# Patient Record
Sex: FEMALE | Race: OTHER | HISPANIC OR LATINO | Employment: OTHER | URBAN - METROPOLITAN AREA
[De-identification: names, ages, dates, MRNs, and addresses within clinical notes are randomized per-mention and may not be internally consistent; named-entity substitution may affect disease eponyms.]

---

## 2017-02-11 ENCOUNTER — APPOINTMENT (OUTPATIENT)
Dept: LAB | Facility: MEDICAL CENTER | Age: 82
End: 2017-02-11
Payer: MEDICARE

## 2017-02-11 ENCOUNTER — TRANSCRIBE ORDERS (OUTPATIENT)
Dept: ADMINISTRATIVE | Facility: HOSPITAL | Age: 82
End: 2017-02-11

## 2017-02-11 DIAGNOSIS — R26.89 SCISSOR GAIT: ICD-10-CM

## 2017-02-11 DIAGNOSIS — N39.0 URINARY TRACT INFECTION, SITE NOT SPECIFIED: ICD-10-CM

## 2017-02-11 DIAGNOSIS — N39.0 URINARY TRACT INFECTION, SITE NOT SPECIFIED: Primary | ICD-10-CM

## 2017-02-11 PROCEDURE — 87086 URINE CULTURE/COLONY COUNT: CPT

## 2017-02-12 LAB — BACTERIA UR CULT: NORMAL

## 2017-02-21 ENCOUNTER — HOSPITAL ENCOUNTER (OUTPATIENT)
Dept: CT IMAGING | Facility: HOSPITAL | Age: 82
Discharge: HOME | End: 2017-02-21
Attending: INTERNAL MEDICINE
Payer: MEDICARE

## 2017-02-21 DIAGNOSIS — R26.89 SCISSOR GAIT: ICD-10-CM

## 2017-02-21 PROCEDURE — 70450 CT HEAD/BRAIN W/O DYE: CPT

## 2017-05-12 ENCOUNTER — TRANSCRIBE ORDERS (OUTPATIENT)
Dept: ADMINISTRATIVE | Facility: HOSPITAL | Age: 82
End: 2017-05-12

## 2017-05-12 ENCOUNTER — LAB (OUTPATIENT)
Dept: LAB | Facility: MEDICAL CENTER | Age: 82
End: 2017-05-12
Payer: MEDICARE

## 2017-05-12 DIAGNOSIS — E22.8 CENTRAL PRECOCIOUS PUBERTY (HCC): ICD-10-CM

## 2017-05-12 DIAGNOSIS — E10.8 TYPE 1 DIABETES MELLITUS WITH COMPLICATION (HCC): ICD-10-CM

## 2017-05-12 DIAGNOSIS — E21.0 PRIMARY HYPERPARATHYROIDISM (HCC): ICD-10-CM

## 2017-05-12 DIAGNOSIS — E22.0 ACROMEGALY AND GIGANTISM (HCC): ICD-10-CM

## 2017-05-12 DIAGNOSIS — E10.8 TYPE 1 DIABETES MELLITUS WITH COMPLICATION (HCC): Primary | ICD-10-CM

## 2017-05-12 LAB
ALBUMIN SERPL BCP-MCNC: 3.6 G/DL (ref 3.5–5)
ALP SERPL-CCNC: 91 U/L (ref 46–116)
ALT SERPL W P-5'-P-CCNC: 39 U/L (ref 12–78)
ANION GAP SERPL CALCULATED.3IONS-SCNC: 10 MMOL/L (ref 4–13)
AST SERPL W P-5'-P-CCNC: 33 U/L (ref 5–45)
BASOPHILS # BLD AUTO: 0.05 THOUSANDS/ΜL (ref 0–0.1)
BASOPHILS NFR BLD AUTO: 1 % (ref 0–1)
BILIRUB SERPL-MCNC: 0.52 MG/DL (ref 0.2–1)
BILIRUB UR QL STRIP: ABNORMAL
BUN SERPL-MCNC: 12 MG/DL (ref 5–25)
CALCIUM ALBUM COR SERPL-MCNC: 11.4 MG/DL (ref 8.3–10.1)
CALCIUM SERPL-MCNC: 11.1 MG/DL (ref 8.3–10.1)
CEA SERPL-MCNC: 2.4 NG/ML (ref 0–3)
CHLORIDE SERPL-SCNC: 107 MMOL/L (ref 100–108)
CLARITY UR: ABNORMAL
CO2 SERPL-SCNC: 24 MMOL/L (ref 21–32)
COLOR UR: ABNORMAL
CORTIS AM PEAK SERPL-MCNC: 14 UG/ML (ref 4.2–22.4)
CREAT SERPL-MCNC: 0.73 MG/DL (ref 0.6–1.3)
EOSINOPHIL # BLD AUTO: 0.3 THOUSAND/ΜL (ref 0–0.61)
EOSINOPHIL NFR BLD AUTO: 4 % (ref 0–6)
ERYTHROCYTE [DISTWIDTH] IN BLOOD BY AUTOMATED COUNT: 14 % (ref 11.6–15.1)
EST. AVERAGE GLUCOSE BLD GHB EST-MCNC: 174 MG/DL
GFR SERPL CREATININE-BSD FRML MDRD: >60 ML/MIN/1.73SQ M
GLUCOSE P FAST SERPL-MCNC: 140 MG/DL (ref 65–99)
GLUCOSE UR STRIP-MCNC: NEGATIVE MG/DL
HBA1C MFR BLD: 7.7 % (ref 4.2–6.3)
HCT VFR BLD AUTO: 41.3 % (ref 34.8–46.1)
HGB BLD-MCNC: 13.6 G/DL (ref 11.5–15.4)
HGB UR QL STRIP.AUTO: NEGATIVE
KETONES UR STRIP-MCNC: NEGATIVE MG/DL
LEUKOCYTE ESTERASE UR QL STRIP: NEGATIVE
LYMPHOCYTES # BLD AUTO: 2.34 THOUSANDS/ΜL (ref 0.6–4.47)
LYMPHOCYTES NFR BLD AUTO: 33 % (ref 14–44)
MAGNESIUM SERPL-MCNC: 2 MG/DL (ref 1.6–2.6)
MCH RBC QN AUTO: 29.4 PG (ref 26.8–34.3)
MCHC RBC AUTO-ENTMCNC: 32.9 G/DL (ref 31.4–37.4)
MCV RBC AUTO: 89 FL (ref 82–98)
MONOCYTES # BLD AUTO: 0.5 THOUSAND/ΜL (ref 0.17–1.22)
MONOCYTES NFR BLD AUTO: 7 % (ref 4–12)
NEUTROPHILS # BLD AUTO: 3.91 THOUSANDS/ΜL (ref 1.85–7.62)
NEUTS SEG NFR BLD AUTO: 55 % (ref 43–75)
NITRITE UR QL STRIP: NEGATIVE
NRBC BLD AUTO-RTO: 0 /100 WBCS
PH UR STRIP.AUTO: 6 [PH] (ref 4.5–8)
PHOSPHATE SERPL-MCNC: 2.8 MG/DL (ref 2.3–4.1)
PLATELET # BLD AUTO: 183 THOUSANDS/UL (ref 149–390)
PMV BLD AUTO: 10.8 FL (ref 8.9–12.7)
POTASSIUM SERPL-SCNC: 4 MMOL/L (ref 3.5–5.3)
PROLACTIN SERPL-MCNC: 1.3 NG/ML
PROT SERPL-MCNC: 7.5 G/DL (ref 6.4–8.2)
PROT UR STRIP-MCNC: NEGATIVE MG/DL
PTH-INTACT SERPL-MCNC: 49.3 PG/ML (ref 14–72)
RBC # BLD AUTO: 4.63 MILLION/UL (ref 3.81–5.12)
SODIUM SERPL-SCNC: 141 MMOL/L (ref 136–145)
SP GR UR STRIP.AUTO: 1.03 (ref 1–1.03)
T4 FREE SERPL-MCNC: 1.07 NG/DL (ref 0.76–1.46)
TSH SERPL DL<=0.05 MIU/L-ACNC: 1.59 UIU/ML (ref 0.36–3.74)
UROBILINOGEN UR QL STRIP.AUTO: 1 E.U./DL
WBC # BLD AUTO: 7.12 THOUSAND/UL (ref 4.31–10.16)

## 2017-05-12 PROCEDURE — 36415 COLL VENOUS BLD VENIPUNCTURE: CPT

## 2017-05-12 PROCEDURE — 82024 ASSAY OF ACTH: CPT

## 2017-05-12 PROCEDURE — 84100 ASSAY OF PHOSPHORUS: CPT

## 2017-05-12 PROCEDURE — 85025 COMPLETE CBC W/AUTO DIFF WBC: CPT

## 2017-05-12 PROCEDURE — 83003 ASSAY GROWTH HORMONE (HGH): CPT

## 2017-05-12 PROCEDURE — 84305 ASSAY OF SOMATOMEDIN: CPT

## 2017-05-12 PROCEDURE — 82378 CARCINOEMBRYONIC ANTIGEN: CPT

## 2017-05-12 PROCEDURE — 80053 COMPREHEN METABOLIC PANEL: CPT

## 2017-05-12 PROCEDURE — 83735 ASSAY OF MAGNESIUM: CPT

## 2017-05-12 PROCEDURE — 84443 ASSAY THYROID STIM HORMONE: CPT

## 2017-05-12 PROCEDURE — 84146 ASSAY OF PROLACTIN: CPT

## 2017-05-12 PROCEDURE — 81003 URINALYSIS AUTO W/O SCOPE: CPT | Performed by: SPECIALIST

## 2017-05-12 PROCEDURE — 83835 ASSAY OF METANEPHRINES: CPT

## 2017-05-12 PROCEDURE — 83036 HEMOGLOBIN GLYCOSYLATED A1C: CPT

## 2017-05-12 PROCEDURE — 83970 ASSAY OF PARATHORMONE: CPT

## 2017-05-12 PROCEDURE — 82533 TOTAL CORTISOL: CPT

## 2017-05-12 PROCEDURE — 82308 ASSAY OF CALCITONIN: CPT

## 2017-05-12 PROCEDURE — 84439 ASSAY OF FREE THYROXINE: CPT

## 2017-05-13 LAB — ACTH PLAS-MCNC: 40.2 PG/ML (ref 7.2–63.3)

## 2017-05-14 LAB
GH SERPL-MCNC: 1.1 NG/ML (ref 0–10)
IGF-I SERPL-MCNC: 230 NG/ML (ref 34–178)

## 2017-05-15 LAB — CALCIT SERPL-MCNC: <2 PG/ML (ref 0–5)

## 2017-05-18 LAB
METANEPH FREE SERPL-MCNC: 23 PG/ML (ref 0–62)
NORMETANEPHRINE SERPL-MCNC: 52 PG/ML (ref 0–145)

## 2017-08-15 ENCOUNTER — ALLSCRIPTS OFFICE VISIT (OUTPATIENT)
Dept: OTHER | Facility: OTHER | Age: 82
End: 2017-08-15

## 2017-09-12 ENCOUNTER — ALLSCRIPTS OFFICE VISIT (OUTPATIENT)
Dept: OTHER | Facility: OTHER | Age: 82
End: 2017-09-12

## 2017-09-26 ENCOUNTER — GENERIC CONVERSION - ENCOUNTER (OUTPATIENT)
Dept: OTHER | Facility: OTHER | Age: 82
End: 2017-09-26

## 2017-11-02 ENCOUNTER — GENERIC CONVERSION - ENCOUNTER (OUTPATIENT)
Dept: OTHER | Facility: OTHER | Age: 82
End: 2017-11-02

## 2017-11-21 ENCOUNTER — ALLSCRIPTS OFFICE VISIT (OUTPATIENT)
Dept: OTHER | Facility: OTHER | Age: 82
End: 2017-11-21

## 2017-11-28 ENCOUNTER — ALLSCRIPTS OFFICE VISIT (OUTPATIENT)
Dept: OTHER | Facility: OTHER | Age: 82
End: 2017-11-28

## 2018-01-11 NOTE — PROGRESS NOTES
Assessment    1  Alzheimers disease (331 0) (G30 9)   2  Hypertension (401 9) (I10)   3  Type 2 diabetes mellitus (250 00) (E11 9)   4  Encounter for preventive health examination (V70 0) (Z00 00)   5  Bilateral impacted cerumen (380 4) (H61 23)   6  [facility]  Bilateral impacted cerumen    · Removal Impacted Cerumen Requiring Instrumentation one or both ears - POC;  Status:Complete;   Done: [id number]    Discussion/Summary    1 ) Dementia - slums evaluated: 10/30  Engage patient in daily social/physical/cognitive exercises  Patient was previously taking namenda/aricept, this has since been dc'd  Discussed with Dr Sudarshan Jerome, will trial patient off ritalin now that she is in structured environment to see how she does - will restart if she begins to have less focus/more tired  2 ) Cerumen impaction - bilat ear canals freed of soft yellow wax with currette  Patient sruthi well  Able to iualize TM's pearly grey bilat at end of procedure  3 ) DM2 - continues on metformin daily without difficulty  HA1c wnl for patient's age group  WIll continue to monitor  4 ) Hypertension - stable with diovan 80mg  Cont daily dosing and monitor for headaches/dizziness  5 ) Health Maintenance - patient currently residing in [address] [place] [address] for assist with ADL/iadl's  She is adjusting well  Will need to follow up on screenings and vaccinations with family as patient is unable to furnish details  Also, will need to discuss advanced directives with family and patient according to her health care goals  No POLST on chart  Chief Complaint  Pt here for Medicare Annual visit      History of Present Illness  Mrs Chris Brunson is an 79yo female who presents for annual wellness visit  Patient is alert and oriented to self, partially to time, place, and situation  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 [employer]  She is able to ambulate with walker  She wears glasses, no hearing aides   She is enjoying her new home at [facility] and has made friends, enjoying social activities, enjoying meals  Mrs Waldemar Hairston has h/o Alzheimer's dementia, hypertension, diabetes 2, and ambulatory dysfunction  She is taking bromocriptine for acromegaly and Ritalin to help her focus and stay up  WIll check SLUMs to evaluate patient's cognitive impairment  Patient answers questions and follows commands  She states she is self care for bathing, grooming, dressing  She does not drive, shop, or cook  She is assist for bill writing and has med management done by facility  Patient denies recent falls  Denies urinary incont  Denies depression  Patient is unsure when last vaccinations were, when last screenings were  Labs were recently done in May 2017  HA1c was 7 7, TSH 1 590, GFR >60, calcium 11 1, and albumin 3 6  Falls Risk: The patient fell 0 times in the past 12 months  The patient currently has no urinary incontinence symptoms  unsure      Review of Systems    Constitutional: negative  Head and Face: negative  ENT: negative  Cardiovascular: negative  Respiratory: negative  Gastrointestinal: negative  Genitourinary: negative  Musculoskeletal: negative  Integumentary and Breasts: negative  Neurological: negative  Psychiatric: negative  Endocrine: negative  Hematologic and Lymphatic: negative  Active Problems    1  Abnormal glucose (790 29) (R73 09)   2  Abnormal weight gain (783 1) (R63 5)   3  Abnormal weight loss (783 21) (R63 4)   4  Alopecia (704 00) (L65 9)   5  Alzheimers disease (331 0) (G30 9)   6  Depression (311) (F32 9)   7  Difficulty in walking (719 7) (R26 2)   8  Fatigue (780 79) (R53 83)   9  Hypercholesterolemia (272 0) (E78 00)   10  Hypertension (401 9) (I10)   11  Osteopenia (733 90) (M85 80)   12  Sleep disorder (780 50) (G47 9)   13  Type 2 diabetes mellitus (250 00) (E11 9)    Past Medical History    1  History of Alzheimers disease (331 0) (G30 9)   2   History of Carpal tunnel syndrome, unspecified laterality (354 0) (G56 00)   3  History of Fracture Of The Ankle (824 8)   4  History of headache (V13 89) (Z87 898)   5  History of Memory Lapses Or Loss (780 93)    The active problems and past medical history were reviewed and updated today  Surgical History    1  History of Rotator Cuff Repair    The surgical history was reviewed and updated today  Family History  Mother    1  Family history of Cardiovascular Disorder (V17 49)   2  Family history of Family Health Status Of Mother -   Father    3  Family history of Brain Tumor   4  Family history of Family Health Status Of Father -     The family history was reviewed and updated today  Social History    · Denied: History of Alcohol Use (History)   · Marital History - Currently    · Never A Smoker   · Never Used Drugs   · Retired From Work   · [employer]  The social history was reviewed and updated today  Current Meds   1  Acetaminophen 500 MG Oral Tablet; take 2 tablet by mouth every 6hours as needed for   pain; Therapy: 21OAE1463 to Recorded   2  Bromocriptine Mesylate 2 5 MG Oral Tablet; take quarter(1/4) of a tablet; Therapy: (Recorded:05Hux3374) to Recorded   3  Citalopram Hydrobromide 10 MG Oral Tablet; one half (1/2) tablet by mouth daily; Therapy: (Recorded:75Gnc5785) to Recorded   4  Diovan 80 MG Oral Tablet; TAKE 1 TABLET DAILY; Therapy: (Recorded:99Ayy2230) to Recorded   5  MetFORMIN HCl - 500 MG Oral Tablet; TAKE 1 TABLET DAILY at bed time; Therapy: (Recorded:58Aoo6927) to Recorded   6  Vitamin D3 2000 UNIT Oral Tablet; Take one tablet daily; Therapy: 16ZBX2094 to (Evaluate:61Cea4685) Recorded    The medication list was reviewed and updated today  Allergies    1   No Known Drug Allergies    Vitals  Signs   Recorded: 2017 09:54AM   Heart Rate: 75, R Radial  Respiration: 18  Systolic: 856, RUE, Sitting  Diastolic: 78, RUE, Sitting  Weight: 154 lb BMI Calculated: 32 19  BSA Calculated: 1 63  O2 Saturation: 96    Future Appointments    Date/Time Provider Specialty Site   09/12/2017 09:30 AM [facility] OFFICE     Signatures   Electronically signed by : JD Green; Aug 15 2017  4:57PM EST                       (Author)    Electronically signed by : Ambrosio Polanco, [facility];  Aug 15 2017  5:00PM EST                       (Author)    Electronically signed by : PREETI Kingsley ; Aug 16 2017  8:51AM EST                       (Co-author)

## 2018-01-11 NOTE — MISCELLANEOUS
Message  Nursing concern: Patient was bathed by nursing who noted scant light blood tinge on wash cloth  After inspection, they noted that there was not hematuria (pt incont urine, yellow urine in pad)  They noted no dark stool or gary blood in stool  They do note skin irritation which has been ongoing and likely source of minor bleeding  Will change from powder to Desitin and follow as needed  Nursing aware        Signatures   Electronically signed by : Foreign Zee, Tiffanie Ayers; Sep 26 2017 10:33AM EST                       (Author)

## 2018-01-13 VITALS
RESPIRATION RATE: 18 BRPM | HEART RATE: 75 BPM | SYSTOLIC BLOOD PRESSURE: 130 MMHG | BODY MASS INDEX: 32.19 KG/M2 | WEIGHT: 154 LBS | DIASTOLIC BLOOD PRESSURE: 78 MMHG | OXYGEN SATURATION: 96 %

## 2018-01-13 NOTE — PROGRESS NOTES
Assessment  Assessed    1  Alzheimers disease (331 0) (G30 9)   2  Abnormal gait (781 2) (R26 9)   3  Hypertension (401 9) (I10)   4  Type 2 diabetes mellitus (250 00) (E11 9)    Discussion/Summary  Discussion Summary:   1 ) Hypertension - bp's checked daily - 109//80  Continue to monitor and continue current regimen  May need to adjust meds if bp trends upward    2 ) DM2 - no regular bs check  Continues w/metformin  Per nursing notes, 9/25 next labs  3 ) Alzheirmer's disease - ritalin restarted  Patient more alert  Patient having some decline in self care, pt now on tolieting program and reminders for meals  Feels good  Contiue with current poc, supportive care for adl's/iadl's  Doing well in current environment  FAST score 6d  4 ) Gait disturbance - fall x 1 off bed  Now in Pt/Ot for strengthening and balance  Fall precautions  Counseling Documentation With Imm: The patient was counseled regarding instructions for management, importance of compliance with treatment  Chief Complaint  Chief Complaint Free Text Note Form: Pt here 2wk f/u- Alz disease, HTN,DM type2      History of Present Illness  HPI: Mrs Marlon Velázquez here for follow up of al, htn, dm    No headaches or dizziness  Does not recall falling from bed as previously reported  Denies weakness  Does a lot of walking  Denies leg pains or back pain  No shortness or breath  Eating well, without gi upset  No gu symptoms  No anxiety/depression  No sleeping problems at night  Unsure how blood sugar is doing  No further hearing issues  Nursing concerns: incont of bowel and bladder - is on q2hr tolieting program  Is aware to remove wet articles but not replace  Will try to leave clean clothes out to prompt patient  Leading patient to dining room, but able to eat by self  Decreased grogginess since ritalin started  Decreased daytime napping  DId have fall, now has bed wedge that was in place from previous residence  Reminders for meals        Review of Systems  Complete-Female:   Constitutional: No fever, no chills, feels well, no tiredness, no recent weight gain or weight loss  Eyes: No complaints of eye pain, no red eyes, no eyesight problems, no discharge, no dry eyes, no itching of eyes  ENT: no complaints of earache, no loss of hearing, no nose bleeds, no nasal discharge, no sore throat, no hoarseness  Cardiovascular: No complaints of slow heart rate, no fast heart rate, no chest pain, no palpitations, no leg claudication, no lower extremity edema  Respiratory: No complaints of shortness of breath, no wheezing, no cough, no SOB on exertion, no orthopnea, no PND  Gastrointestinal: No complaints of abdominal pain, no constipation, no nausea or vomiting, no diarrhea, no bloody stools  Genitourinary: No complaints of dysuria, no incontinence, no pelvic pain, no dysmenorrhea, no vaginal discharge or bleeding  Musculoskeletal: No complaints of arthralgias, no myalgias, no joint swelling or stiffness, no limb pain or swelling  Integumentary: No complaints of skin rash or lesions, no itching, no skin wounds, no breast pain or lump  Neurological: No complaints of headache, no confusion, no convulsions, no numbness, no dizziness or fainting, no tingling, no limb weakness, no difficulty walking  Psychiatric: Not suicidal, no sleep disturbance, no anxiety or depression, no change in personality, no emotional problems  Endocrine: No complaints of proptosis, no hot flashes, no muscle weakness, no deepening of the voice, no feelings of weakness  Hematologic/Lymphatic: No complaints of swollen glands, no swollen glands in the neck, does not bleed easily, does not bruise easily  Active Problems  Problems    1  Abnormal glucose (790 29) (R73 09)   2  Abnormal weight gain (783 1) (R63 5)   3  Abnormal weight loss (783 21) (R63 4)   4  Acromegalia (253 0) (E22 0)   5  Alopecia (704 00) (L65 9)   6  Alzheimers disease (331 0) (G30 9)   7   Arthritis (716 90) (M19 90)   8  Bilateral impacted cerumen (380 4) (H61 23)   9  Depression (311) (F32 9)   10  Difficulty in walking (719 7) (R26 2)   11  Fatigue (780 79) (R53 83)   12  Hypercholesterolemia (272 0) (E78 00)   13  Hypertension (401 9) (I10)   14  Osteopenia (733 90) (M85 80)   15  Sleep disorder (780 50) (G47 9)   16  Type 2 diabetes mellitus (250 00) (E11 9)   17  Vitamin D deficiency (268 9) (E55 9)    Past Medical History  Problems    1  History of Alzheimers disease (331 0) (G30 9)   2  History of Carpal tunnel syndrome, unspecified laterality (354 0) (G56 00)   3  History of Fracture Of The Ankle (824 8)   4  History of headache (V13 89) (Z87 898)   5  History of Memory Lapses Or Loss (780 93)    Surgical History  Problems    1  History of Rotator Cuff Repair    Family History  Mother    1  Family history of Cardiovascular Disorder (V17 49)   2  Family history of Family Health Status Of Mother -   Father    3  Family history of Brain Tumor   4  Family history of Family Health Status Of Father -     Social History  Problems    · Denied: History of Alcohol Use (History)   · Marital History - Currently    · Never A Smoker   · Never Used Drugs   · Retired From Work   · [address]   1  Acetaminophen 500 MG Oral Tablet; take 2 tablet by mouth every 6hours as needed for   pain; Therapy: 36LNR0795 to Recorded   2  Bromocriptine Mesylate 2 5 MG Oral Tablet; take quarter(1/4) of a tablet; Therapy: (Recorded:74Hbg9487) to Recorded   3  Citalopram Hydrobromide 10 MG Oral Tablet; one half (1/2) tablet by mouth daily; Therapy: (Recorded:23Bdw5940) to Recorded   4  Diovan 80 MG Oral Tablet; TAKE 1 TABLET DAILY; Therapy: (Recorded:22Kvp3526) to Recorded   5  MetFORMIN HCl - 500 MG Oral Tablet; TAKE 1 TABLET DAILY at bed time; Therapy: (Recorded:81Gaf4567) to Recorded   6  Methylphenidate HCl - 5 MG Oral Tablet; GIVE 1/2 TABLET DAILY;    Therapy: 75Kqv1728 to Recorded   7  Vitamin D3 2000 UNIT Oral Tablet; Take one tablet daily; Therapy: 48XAW4554 to (Evaluate:65Bkt3215) Recorded  Medication List Reviewed: The medication list was reviewed and updated today  Allergies  Medication    1  No Known Drug Allergies    Vitals  Signs   Recorded: 12Sep2017 09:50AM   Heart Rate: 77, R Radial  Respiration: 18  Systolic: 433, RUE, Sitting  Diastolic: 68, RUE, Sitting  Height: 4 ft 9 5 in  Weight: 156 lb   BMI Calculated: 33 17  BSA Calculated: 1 63  O2 Saturation: 98    Physical Exam  General Multi-System Exam [facility]:   Constitutional   General appearance: No acute distress, well appearing and well nourished  Eyes   EOM: normal    Ears, Nose, Mouth, and Throat   Conversational Hearing: Normal     Pulmonary   Respiratory effort: No increased work of breathing or signs of respiratory distress  Auscultation of lungs: Clear to auscultation  Cardiovascular   Auscultation of heart: Normal rate and rhythm, normal S1 and S2, no murmurs  Examination of extremities for edema and/or varicosities: Normal     Abdomen   Abdomen: Non-tender, no masses  Psychiatric   Orientation to person, place and time: Abnormal   (oriented to person, somewhat to situation, place, time)   Recent and remote memory: Abnormal   Memory: impaired short term memory  Mood and affect: Normal        Results/Data  PHQ-2 Adult Depression Screening 12Sep2017 09:56AM User, [employer]     Test Name Result Flag Reference   PHQ-2 Adult Depression Score 0     Over the last two weeks, how often have you been bothered by any of the following problems?   Little interest or pleasure in doing things: Not at all - 0  Feeling down, depressed, or hopeless: Not at all - 0   PHQ-2 Adult Depression Screening Negative       Falls Risk Assessment (Dx Z13 89 Screen for Neurologic Disorder) 10ZNU7850 09:56AM User, [employer]     Test Name Result Flag Reference   Falls Risk      1 fall without injury in the past year Signatures   Electronically signed by : Kwadwo Polanco; Sep 13 2017  8:18AM EST                       (Author)    Electronically signed by : PREETI Yeager ; Sep 18 2017  8:35AM EST                       (Co-author)

## 2018-01-14 VITALS
HEART RATE: 77 BPM | BODY MASS INDEX: 32.75 KG/M2 | OXYGEN SATURATION: 98 % | HEIGHT: 58 IN | SYSTOLIC BLOOD PRESSURE: 134 MMHG | RESPIRATION RATE: 18 BRPM | WEIGHT: 156 LBS | DIASTOLIC BLOOD PRESSURE: 68 MMHG

## 2018-01-15 NOTE — PROGRESS NOTES
Assessment    1  Type 2 diabetes mellitus (250 00) (E11 9)   2  Diarrhea due to drug (787 91,E980 5) (K52 1)    Discussion/Summary  Discussion Summary:   1 ) Diarrhea- has resolved since metformin dosage is decreased  Continue to monitor if any further stooling  2 ) dm 2- will continue with Dr Rut Rossi recommendations for metformin 1000 mg at HS along with tresiba 10 units subcut at HS  Will have nursing monitor sugar bid  for now- call if blood sugar is less than 100 or greater then 350  Patient has been given sample to receive a by Dr Cesar Epley    Family will bring in glucometer  will order supplies once with glucometer is received  Will have patient return next week to follow up on blood sugars and see if insulin is to be titrated  Counseling Documentation With Imm: The patient, patient's family, patient's caretaker was counseled regarding  Chief Complaint  Chief Complaint Free Text Note Form: PC nurse c/o diarrhea since the increase of metformin      History of Present Illness  HPI: Mrs Sarah Lopez presents for acute visit for diarrhea  Patient standard medication for diabetes is metformin 1000 mg taken at bedtime  She was recently increased to b i d  and once that occurred she started having increasing diarrhea  Blood sugars did respond to that increase went from 200 to140's  Per nurse's notes a m  dose of metformin was Dc'd and she is to receive 10 units tresiba at p m  per Dr Cesar Epley order  Patient is poor historian secondary to dementia: she is unaware that she is diabetic or that she had the diarrhea  Review of Systems  Complete-Female:   Constitutional: No fever, no chills, feels well, no tiredness, no recent weight gain or weight loss  Eyes: No complaints of eye pain, no red eyes, no eyesight problems, no discharge, no dry eyes, no itching of eyes     Cardiovascular: No complaints of slow heart rate, no fast heart rate, no chest pain, no palpitations, no leg claudication, no lower extremity edema  Respiratory: No complaints of shortness of breath, no wheezing, no cough, no SOB on exertion, no orthopnea, no PND  Gastrointestinal: No complaints of abdominal pain, no constipation, no nausea or vomiting, no diarrhea, no bloody stools and as noted in HPI  Musculoskeletal: No complaints of arthralgias, no myalgias, no joint swelling or stiffness, no limb pain or swelling  Active Problems    1  Abnormal gait (781 2) (R26 9)   2  Abnormal glucose (790 29) (R73 09)   3  Abnormal weight gain (783 1) (R63 5)   4  Abnormal weight loss (783 21) (R63 4)   5  Acromegalia (253 0) (E22 0)   6  Alopecia (704 00) (L65 9)   7  Alzheimers disease (331 0) (G30 9)   8  Arthritis (716 90) (M19 90)   9  Bilateral impacted cerumen (380 4) (H61 23)   10  Depression (311) (F32 9)   11  Difficulty in walking (719 7) (R26 2)   12  Fatigue (780 79) (R53 83)   13  Flu vaccine need (V04 81) (Z23)   14  Hypercholesterolemia (272 0) (E78 00)   15  Hypertension (401 9) (I10)   16  Osteopenia (733 90) (M85 80)   17  Sleep disorder (780 50) (G47 9)   18  Vitamin D deficiency (268 9) (E55 9)    Past Medical History    1  History of Alzheimers disease (331 0) (G30 9)   2  History of Carpal tunnel syndrome, unspecified laterality (354 0) (G56 00)   3  History of Fracture Of The Ankle (824 8)   4  History of headache (V13 89) (Z87 898)   5  History of Memory Lapses Or Loss (780 93)    Surgical History    1  History of Rotator Cuff Repair    Family History  Mother    1  Family history of Cardiovascular Disorder (V17 49)   2  Family history of Family Health Status Of Mother -   Father    3  Family history of Brain Tumor   4  Family history of Family Health Status Of Father -     Social History    · Denied: History of Alcohol Use (History)   · Marital History - Currently    · Never A Smoker   · Never Used Drugs   · Retired From Work   · [address]   1   Acetaminophen 500 MG Oral Tablet; take 2 tablet by mouth every 6hours as needed for   pain; Therapy: 49OYZ0744 to Recorded   2  Bromocriptine Mesylate 2 5 MG Oral Tablet; take quarter(1/4) of a tablet; Therapy: (Recorded:35Cuj0159) to Recorded   3  Citalopram Hydrobromide 10 MG Oral Tablet; one half (1/2) tablet by mouth daily; Therapy: (Recorded:49Qeh3789) to Recorded   4  Diovan 80 MG Oral Tablet; TAKE 1 TABLET DAILY; Therapy: (Recorded:20Vkq9989) to Recorded   5  MetFORMIN HCl - 1000 MG Oral Tablet; Take 1 tablet daily; Therapy: (0664 313 06 [id number]) to Recorded   6  Methylphenidate HCl - 5 MG Oral Tablet; TAKE 1 TABLET DAILY; Therapy: 69Rac8090 to Recorded   7  [employer] FlexTouch 200 UNIT/ML Subcutaneous Solution Pen-injector; Admin 10unit at   bedtime; Therapy: 27HBV0113 to Recorded   8  Vitamin D3 2000 UNIT Oral Tablet; Take one tablet daily; Therapy: 79ZCL9785 to (Evaluate:86Irh0529) Recorded  Medication List Reviewed: The medication list was reviewed and updated today  Vitals  Signs    Temperature: 97 8 F  Heart Rate: 76, R Radial  Respiration: 18  Systolic: 560, RUE, Sitting  Diastolic: 70, RUE, Sitting  Weight: 155 lb 0 4 oz  BMI Calculated: 32 97  BSA Calculated: 1 62  O2 Saturation: 96    Physical Exam  General Complete Exam (Brief):   Constitutional   General appearance: No acute distress, well appearing and well nourished  Pulmonary   Respiratory effort: No increased work of breathing or signs of respiratory distress  Auscultation of lungs: Clear to auscultation  Cardiovascular   Auscultation of heart: Normal rate and rhythm, normal S1 and S2, without murmurs  Abdomen   Abdomen: Non-tender, no masses  Musculoskeletal   Gait and station: Abnormal   (steady with walker)   Skin   Skin and subcutaneous tissue: Normal without rashes or lesions  foot exam intact, +soft/sharp sensation     Psychiatric   Orientation to person, place and time: Abnormal   Mental Status: disoriented to place, disoriented to time and inadequate knowledge of current events, but oriented to person     Mood and affect: Normal        Signatures   Electronically signed by : Sixto Bo, [address]; Nov 21 2017 12:53PM EST                       (Author)    Electronically signed by : PREETI Correia ; Nov 29 2017  8:42AM EST                       (Author)    Electronically signed by : PREETI Correia ; Nov 29 2017  8:42AM EST                       (Co-author)

## 2018-01-16 NOTE — PROGRESS NOTES
Assessment  Assessed   1  Type 2 diabetes mellitus (250 00) (E11 9)    Discussion/Summary  Discussion Summary:   1 ) DM2 - Patient tolerating introduction of tresiba w/out any concerns  FBS range from 157- 186  -355  Unclear if these blood sugars are taken after meals  will discuss with nursing to make sure they are not within 2 hours of eating and then consider adding Humalog if needed  Counseling Documentation With Imm: The patient, patient's caretaker was counseled regarding  Patient's Capacity to Self-Care: Patient is unable to Self-Care:      Chief Complaint  Chief Complaint Free Text Note Form: Pt here for 1wk f/u- DM type2      History of Present Illness  HPI: Patient here for follow-up of diabetes  Procedure was started last week and blood sugars are reviewed today  Patient has no concerns she denies any dizziness or nausea  She does not remember getting any shots in the abdomen states that the new method of medications not bother her at all  No nurse available to comment on patient's diarrhea  Patient unable to remember she has had any  Review of Systems  Complete-Female:   Constitutional: No fever, no chills, feels well, no tiredness, no recent weight gain or weight loss  Eyes: No complaints of eye pain, no red eyes, no eyesight problems, no discharge, no dry eyes, no itching of eyes  ENT: no complaints of earache, no loss of hearing, no nose bleeds, no nasal discharge, no sore throat, no hoarseness  Cardiovascular: No complaints of slow heart rate, no fast heart rate, no chest pain, no palpitations, no leg claudication, no lower extremity edema  Respiratory: No complaints of shortness of breath, no wheezing, no cough, no SOB on exertion, no orthopnea, no PND  Gastrointestinal: No complaints of abdominal pain, no constipation, no nausea or vomiting, no diarrhea, no bloody stools     Genitourinary: No complaints of dysuria, no incontinence, no pelvic pain, no dysmenorrhea, no vaginal discharge or bleeding  Musculoskeletal: No complaints of arthralgias, no myalgias, no joint swelling or stiffness, no limb pain or swelling  Integumentary: No complaints of skin rash or lesions, no itching, no skin wounds, no breast pain or lump  Neurological: No complaints of headache, no confusion, no convulsions, no numbness, no dizziness or fainting, no tingling, no limb weakness, no difficulty walking  Psychiatric: Not suicidal, no sleep disturbance, no anxiety or depression, no change in personality, no emotional problems  Active Problems  Problems   1  Abnormal gait (781 2) (R26 9)  2  Abnormal glucose (790 29) (R73 09)  3  Abnormal weight gain (783 1) (R63 5)  4  Abnormal weight loss (783 21) (R63 4)  5  Acromegalia (253 0) (E22 0)  6  Alopecia (704 00) (L65 9)  7  Alzheimers disease (331 0) (G30 9)  8  Arthritis (716 90) (M19 90)  9  Bilateral impacted cerumen (380 4) (H61 23)  10  Depression (311) (F32 9)  11  Diarrhea due to drug (787 91,E980 5) (K52 1)  12  Difficulty in walking (719 7) (R26 2)  13  Fatigue (780 79) (R53 83)  14  Flu vaccine need (V04 81) (Z23)  15  Hypercholesterolemia (272 0) (E78 00)  16  Hypertension (401 9) (I10)  17  Osteopenia (733 90) (M85 80)  18  Sleep disorder (780 50) (G47 9)  19  Type 2 diabetes mellitus (250 00) (E11 9)  20  Vitamin D deficiency (268 9) (E55 9)    Past Medical History  Problems   1  History of Alzheimers disease (331 0) (G30 9)  2  History of Carpal tunnel syndrome, unspecified laterality (354 0) (G56 00)  3  History of Fracture Of The Ankle (824 8)  4  History of headache (V13 89) (Z87 898)  5  History of Memory Lapses Or Loss (780 93)    Surgical History  Problems   1  History of Rotator Cuff Repair    Family History  Mother   1  Family history of Cardiovascular Disorder (V17 49)  2  Family history of Family Health Status Of Mother -   Father   3  Family history of Brain Tumor  4   Family history of Family Health Status Of Father -     Social History  Problems    · Denied: History of Alcohol Use (History)   · Marital History - Currently    · Never A Smoker   · Never Used Drugs   · Retired From Work   · [address]  1  Acetaminophen 500 MG Oral Tablet; take 2 tablet by mouth every 6hours as needed for   pain; Therapy: 77NJP4110 to Recorded  2  Bromocriptine Mesylate 2 5 MG Oral Tablet; take quarter(1/4) of a tablet; Therapy: (Recorded:35Yjp7720) to Recorded  3  Citalopram Hydrobromide 10 MG Oral Tablet; one half (1/2) tablet by mouth daily; Therapy: (Recorded:66Tcn3656) to Recorded  4  Diovan 80 MG Oral Tablet; TAKE 1 TABLET DAILY; Therapy: (Recorded:57Hai8203) to Recorded  5  MetFORMIN HCl - 1000 MG Oral Tablet; Take 1 tablet daily; Therapy: [phone]) to Recorded  6  Methylphenidate HCl - 5 MG Oral Tablet; TAKE 1 TABLET DAILY; Therapy: 67Vdw2153 to (Evaluate:47Rdt8818); Last Rx:2017; Status: ACTIVE -   Retrospective By Protocol Authorization Ordered  7  Leeanne Raul FlexTouch 200 UNIT/ML Subcutaneous Solution Pen-injector; Admin 10unit at   bedtime; Therapy: 64URU4679 to Recorded  8  Vitamin D3 2000 UNIT Oral Tablet; Take one tablet daily; Therapy: 91AQP8939 to (Evaluate:2018) Recorded  Medication List Reviewed: The medication list was reviewed and updated today  Allergies  Medication   1  Amoxicillin CAPS  2  Ibuprofen CAPS    Vitals  Signs   Recorded: 2017 10:39AM   Heart Rate: 78, L Radial  Respiration: 18  Systolic: 271, LUE, Sitting  Diastolic: 60, LUE, Sitting  Weight: 155 lb 0 4 oz  BMI Calculated: 32 97  BSA Calculated: 1 62  O2 Saturation: 95    Physical Exam  General Complete Exam (Brief):   Constitutional   General appearance: No acute distress, well appearing and well nourished      Ears, Nose, Mouth, and Throat   External inspection of ears and nose: Normal     Pulmonary   Respiratory effort: No increased work of breathing or signs of respiratory distress  Auscultation of lungs: Clear to auscultation  Cardiovascular   Auscultation of heart: Normal rate and rhythm, normal S1 and S2, without murmurs  Examination of extremities for edema and/or varicosities: Normal     Abdomen   Abdomen: Non-tender, no masses  Musculoskeletal   Gait and station: Normal     Skin   Skin and subcutaneous tissue: Normal without rashes or lesions  Neurologic   Sensation: No sensory loss  can differentiate sharp and dull on foot exam - no sore areas noted on bilat feet  Psychiatric   Orientation to person, place and time: Abnormal   Mental Status: disoriented to place and disoriented to time, but oriented to person     Mood and affect: Normal        Signatures   Electronically signed by : Army Infante; Nov 28 2017 11:35AM EST                       (Author)    Electronically signed by : PREETI Oakley ; Nov 29 2017  8:52AM EST                       (Co-author)    Electronically signed by : PREETI Oakley ; Nov 29 2017  8:52AM EST                       (Co-author)

## 2018-01-22 VITALS
RESPIRATION RATE: 18 BRPM | HEART RATE: 78 BPM | WEIGHT: 155.03 LBS | DIASTOLIC BLOOD PRESSURE: 60 MMHG | OXYGEN SATURATION: 95 % | BODY MASS INDEX: 32.97 KG/M2 | SYSTOLIC BLOOD PRESSURE: 166 MMHG

## 2018-01-22 VITALS
WEIGHT: 155.03 LBS | RESPIRATION RATE: 18 BRPM | SYSTOLIC BLOOD PRESSURE: 160 MMHG | TEMPERATURE: 97.8 F | HEART RATE: 76 BPM | BODY MASS INDEX: 32.97 KG/M2 | OXYGEN SATURATION: 96 % | DIASTOLIC BLOOD PRESSURE: 70 MMHG

## 2018-03-22 ENCOUNTER — OFFICE VISIT (OUTPATIENT)
Dept: GERIATRICS | Facility: CLINIC | Age: 83
End: 2018-03-22
Payer: MEDICARE

## 2018-03-22 VITALS
WEIGHT: 158.7 LBS | DIASTOLIC BLOOD PRESSURE: 68 MMHG | RESPIRATION RATE: 18 BRPM | BODY MASS INDEX: 33.75 KG/M2 | OXYGEN SATURATION: 93 % | SYSTOLIC BLOOD PRESSURE: 144 MMHG | HEART RATE: 69 BPM

## 2018-03-22 DIAGNOSIS — F02.80 ALZHEIMER'S DEMENTIA WITHOUT BEHAVIORAL DISTURBANCE, UNSPECIFIED TIMING OF DEMENTIA ONSET (HCC): ICD-10-CM

## 2018-03-22 DIAGNOSIS — E11.8 TYPE 2 DIABETES MELLITUS WITH COMPLICATION, WITH LONG-TERM CURRENT USE OF INSULIN (HCC): ICD-10-CM

## 2018-03-22 DIAGNOSIS — Z79.4 TYPE 2 DIABETES MELLITUS WITH COMPLICATION, WITH LONG-TERM CURRENT USE OF INSULIN (HCC): ICD-10-CM

## 2018-03-22 DIAGNOSIS — R26.9 ABNORMAL GAIT: ICD-10-CM

## 2018-03-22 DIAGNOSIS — R53.82 CHRONIC FATIGUE: Primary | ICD-10-CM

## 2018-03-22 DIAGNOSIS — G30.9 ALZHEIMER'S DEMENTIA WITHOUT BEHAVIORAL DISTURBANCE, UNSPECIFIED TIMING OF DEMENTIA ONSET (HCC): ICD-10-CM

## 2018-03-22 DIAGNOSIS — I10 ESSENTIAL HYPERTENSION: ICD-10-CM

## 2018-03-22 PROBLEM — E11.9 TYPE 2 DIABETES MELLITUS (HCC): Status: ACTIVE | Noted: 2017-08-15

## 2018-03-22 PROBLEM — H61.23 BILATERAL IMPACTED CERUMEN: Status: ACTIVE | Noted: 2017-08-15

## 2018-03-22 PROBLEM — E55.9 VITAMIN D DEFICIENCY: Status: ACTIVE | Noted: 2017-08-15

## 2018-03-22 PROBLEM — K52.1 DIARRHEA DUE TO DRUG: Status: ACTIVE | Noted: 2017-11-21

## 2018-03-22 PROBLEM — K52.1 DIARRHEA DUE TO DRUG: Status: RESOLVED | Noted: 2017-11-21 | Resolved: 2018-03-22

## 2018-03-22 PROBLEM — M19.90 ARTHRITIS: Status: ACTIVE | Noted: 2017-08-15

## 2018-03-22 PROBLEM — E22.0: Status: ACTIVE | Noted: 2017-08-15

## 2018-03-22 PROCEDURE — 99214 OFFICE O/P EST MOD 30 MIN: CPT | Performed by: NURSE PRACTITIONER

## 2018-03-22 RX ORDER — LANOLIN ALCOHOL/MO/W.PET/CERES
CREAM (GRAM) TOPICAL DAILY
COMMUNITY
End: 2018-09-11 | Stop reason: SDUPTHER

## 2018-03-22 RX ORDER — VALSARTAN 80 MG/1
TABLET ORAL
COMMUNITY
Start: 2018-02-13 | End: 2018-05-29 | Stop reason: ALTCHOICE

## 2018-03-22 RX ORDER — CITALOPRAM 10 MG/1
TABLET ORAL
COMMUNITY
End: 2018-09-11 | Stop reason: SDUPTHER

## 2018-03-22 RX ORDER — INSULIN LISPRO 100 [IU]/ML
INJECTION, SOLUTION INTRAVENOUS; SUBCUTANEOUS
COMMUNITY
Start: 2018-02-26 | End: 2018-03-22 | Stop reason: ALTCHOICE

## 2018-03-22 RX ORDER — ACETAMINOPHEN 500 MG
TABLET ORAL
Status: ON HOLD | COMMUNITY
Start: 2017-08-15 | End: 2018-07-20 | Stop reason: ALTCHOICE

## 2018-03-22 RX ORDER — MELATONIN 10 MG
TABLET, SUBLINGUAL SUBLINGUAL DAILY
COMMUNITY
End: 2018-07-24 | Stop reason: HOSPADM

## 2018-03-22 RX ORDER — INSULIN GLARGINE 100 [IU]/ML
26 INJECTION, SOLUTION SUBCUTANEOUS 2 TIMES DAILY
COMMUNITY
Start: 2018-03-05 | End: 2018-07-24 | Stop reason: HOSPADM

## 2018-03-22 RX ORDER — BROMOCRIPTINE MESYLATE 2.5 MG/1
TABLET ORAL
COMMUNITY
End: 2018-09-11 | Stop reason: SDUPTHER

## 2018-03-22 RX ORDER — METHYLPHENIDATE HYDROCHLORIDE 10 MG/1
TABLET ORAL
COMMUNITY
Start: 2018-02-15 | End: 2018-08-13 | Stop reason: HOSPADM

## 2018-03-22 NOTE — PATIENT INSTRUCTIONS
Dc'd humalog  Make sure lab work for patient is pending (from endo)  Home Sleep study ordered   Return in 3 months or sooner as needed

## 2018-03-22 NOTE — PROGRESS NOTES
Assessment/Plan:    Fatigue  Continues to have day time sleepiness  ENcourage good sleep hygiene practices - involve in daytime activity  Limit daytime sleeping  Avoid caffeine at night  Avoid hs distractions  WIll order at home sleep study so sleep apnea can be ruled out    Abnormal gait  Stable with walker, no recent falls  Patient cont with pt/ot  Fall precautions  Type 2 diabetes mellitus (HCC)  Stable - -236  Follows with endo  Blood work upcoming  No changes at this time    Hypertension  Stable on valsartan  No changes at this time    Alzheimers disease  Oriented to self, partial to place, time, situation  FAST score 5  Continue with supportive care in [facility] setting  Optimize chronic conditions  Engage in daily social, cognitive, and physical activity  Diagnoses and all orders for this visit:    Chronic fatigue    Abnormal gait    Type 2 diabetes mellitus with complication, with long-term current use of insulin (HCC)    Other orders  -     acetaminophen (TYLENOL) 500 mg tablet; TAKE TWO TABLETS BY MOUTH AT 6:30AM AND 6:30PM   -     bromocriptine (PARLODEL) 2 5 mg tablet; TAKE 1/4 TABLET BY MOUTH AT BEDTIME   -     citalopram (CeleXA) 10 mg tablet; TAKE 1/2 TABLET BY MOUTH DAILY   -     LANTUS SOLOSTAR injection pen 100 units/mL; INJECT 28 UNITS SUBCU   -     HUMALOG KWIKPEN 100 UNIT/ML SOPN; 7 UNIT BEFORE DINNER   -     metFORMIN (GLUCOPHAGE) 1000 MG tablet; Take 1 tablet by mouth daily  -     valsartan (DIOVAN) 80 mg tablet; GIVE ONE TABLET PO DAILY   -     methylphenidate (RITALIN) 10 mg tablet; TAKE 1/2 TAB DAILY IN EARLY AM   -     cyanocobalamin (VITAMIN B-12) 1,000 mcg tablet; Take by mouth daily  -     Cholecalciferol (VITAMIN D3) 92168 units TABS; Take by mouth daily        Subjective:   CC  Pt here for routine visit- Review chronic conditions   Patient ID: Pearl Rodriguez is a 80 y o  female      HPI    Pmh, med list, pb list, allergies    Review of Systems   All other systems reviewed and are negative  Objective:      /68 (BP Location: Right arm, Patient Position: Sitting, Cuff Size: Large)   Pulse 69   Resp 18   Wt 72 kg (158 lb 11 2 oz)   SpO2 93%   BMI 33 75 kg/m²          Physical Exam   Constitutional: She appears well-developed and well-nourished  No distress  HENT:   Head: Normocephalic  Cardiovascular: Normal rate and normal heart sounds  Exam reveals no gallop and no friction rub  No murmur heard  Pulmonary/Chest: Effort normal and breath sounds normal  No respiratory distress  She has no wheezes  She has no rales  Abdominal: Soft  Bowel sounds are normal  She exhibits no distension  There is no tenderness  There is no rebound  Musculoskeletal: Normal range of motion  Neurological: She is alert  Oriented to person, partial to place, time, situation   Skin: Skin is warm and dry  She is not diaphoretic  Psychiatric: She has a normal mood and affect  Her speech is normal  Thought content normal  Cognition and memory are impaired  She exhibits abnormal recent memory  forgetful   Nursing note and vitals reviewed

## 2018-03-22 NOTE — ASSESSMENT & PLAN NOTE
Stable - -236    Follows with endo  Blood work upcoming  [employer] before dinner and monitor

## 2018-03-22 NOTE — ASSESSMENT & PLAN NOTE
Continues to have day time sleepiness  ENcourage good sleep hygiene practices - involve in daytime activity  Limit daytime sleeping  Avoid caffeine at night  Avoid hs distractions    WIll order at home sleep study so sleep apnea can be ruled out

## 2018-03-22 NOTE — ASSESSMENT & PLAN NOTE
Oriented to self, partial to place, time, situation  FAST score 5  Continue with supportive care in [facility] setting  Optimize chronic conditions  Engage in daily social, cognitive, and physical activity

## 2018-04-09 LAB
MICROALBUM.,U,RANDOM (HISTORICAL): 1.4 MG/L
MICROALBUMIN/CREATININE RATIO (HISTORICAL): 16 MG/G CREATININE

## 2018-04-16 ENCOUNTER — HOSPITAL ENCOUNTER (OUTPATIENT)
Dept: RADIOLOGY | Facility: HOSPITAL | Age: 83
Discharge: HOME | End: 2018-04-16
Attending: SPECIALIST
Payer: MEDICARE

## 2018-04-16 DIAGNOSIS — E22.9 PITUITARY HYPERFUNCTION (HCC): ICD-10-CM

## 2018-04-16 DIAGNOSIS — E21.0 PRIMARY HYPERPARATHYROIDISM (HCC): ICD-10-CM

## 2018-04-16 PROCEDURE — A9500 TC99M SESTAMIBI: HCPCS

## 2018-04-16 PROCEDURE — 78071 PARATHYRD PLANAR W/WO SUBTRJ: CPT

## 2018-05-08 ENCOUNTER — OFFICE VISIT (OUTPATIENT)
Dept: GERIATRICS | Facility: CLINIC | Age: 83
End: 2018-05-08
Payer: MEDICARE

## 2018-05-08 VITALS
RESPIRATION RATE: 18 BRPM | WEIGHT: 160.1 LBS | BODY MASS INDEX: 34.05 KG/M2 | SYSTOLIC BLOOD PRESSURE: 164 MMHG | DIASTOLIC BLOOD PRESSURE: 80 MMHG | HEART RATE: 89 BPM

## 2018-05-08 DIAGNOSIS — B37.49 CANDIDAL UTI (URINARY TRACT INFECTION): ICD-10-CM

## 2018-05-08 DIAGNOSIS — Z79.4 TYPE 2 DIABETES MELLITUS WITH COMPLICATION, WITH LONG-TERM CURRENT USE OF INSULIN (HCC): Primary | ICD-10-CM

## 2018-05-08 DIAGNOSIS — E11.8 TYPE 2 DIABETES MELLITUS WITH COMPLICATION, WITH LONG-TERM CURRENT USE OF INSULIN (HCC): Primary | ICD-10-CM

## 2018-05-08 PROCEDURE — 99213 OFFICE O/P EST LOW 20 MIN: CPT | Performed by: NURSE PRACTITIONER

## 2018-05-08 NOTE — ASSESSMENT & PLAN NOTE
· BS elevated to 400's this week  · UA checked this am, results pending  · Recent endo appt - please obtain notes  · Increase Lantus to 32 units subcut @ hs  · Give 3 units novolog sub cut prn:  if BS>350, give with snack/food  Notify provider if BS <80 or >400

## 2018-05-08 NOTE — PROGRESS NOTES
Assessment/Plan:    Type 2 diabetes mellitus (HCC)  · BS elevated to 400's this week  · UA checked this am, results pending  · Recent endo appt - please obtain notes  · Increase Lantus to 32 units subcut @ hs  · Give 3 units novolog sub cut prn:  if BS>350, give with snack/food  Notify provider if BS <80 or >400  Candidal UTI (urinary tract infection)  · +yeast in UA  · Discussed w/dr Dyson Stacks - will give diflucan 150mg x1 today and f/u with cx tomorrow       There are no diagnoses linked to this encounter  Subjective:   CC  Pt here for f/u due elevated BS level at 483 05/07   Patient ID: Lenin Segal is a 80 y o  female  HPI    The following portions of the patient's history were reviewed and updated as appropriate: past family history, past social history and past surgical history  Review of Systems   All other systems reviewed and are negative  Objective:      /80 (BP Location: Left arm, Patient Position: Sitting, Cuff Size: Standard)   Pulse 89   Resp 18   Wt 72 6 kg (160 lb 1 6 oz)   BMI 34 05 kg/m²          Physical Exam   Constitutional: She appears well-developed and well-nourished  No distress  HENT:   Head: Normocephalic  Cardiovascular: Normal rate and normal heart sounds  Exam reveals no gallop and no friction rub  No murmur heard  Pulmonary/Chest: Effort normal and breath sounds normal  No respiratory distress  She has no wheezes  She has no rales  Abdominal: Soft  Bowel sounds are normal  She exhibits no distension  There is no tenderness  There is no rebound  Musculoskeletal: Normal range of motion  Neurological: She is alert  Forgetful  Oriented to person, partial to place, time, situation   Skin: Skin is warm and dry  She is not diaphoretic  Psychiatric: She has a normal mood and affect  Her speech is normal and behavior is normal  Thought content normal  Cognition and memory are impaired  She exhibits abnormal recent memory     Nursing note and vitals reviewed

## 2018-05-08 NOTE — PATIENT INSTRUCTIONS
· UA checked this am, results pending  · Recent endo appt - please obtain notes  · Increase Lantus to 32 units subcut @ hs  · Give 3 units novolog sub cut prn:  if BS>350, give with snack/food  Notify provider if BS <80 or >400

## 2018-05-08 NOTE — ASSESSMENT & PLAN NOTE
· +yeast in UA  · Discussed w/dr Chelly Holley - will give diflucan 150mg x1 today and f/u with cx tomorrow

## 2018-05-16 ENCOUNTER — HOSPITAL ENCOUNTER (OUTPATIENT)
Dept: SLEEP CENTER | Facility: CLINIC | Age: 83
Discharge: HOME | End: 2018-05-16
Payer: MEDICARE

## 2018-05-16 DIAGNOSIS — R53.82 CHRONIC FATIGUE: ICD-10-CM

## 2018-05-16 PROCEDURE — G0399 HOME SLEEP TEST/TYPE 3 PORTA: HCPCS

## 2018-05-23 DIAGNOSIS — G47.33 OSA (OBSTRUCTIVE SLEEP APNEA): Primary | ICD-10-CM

## 2018-05-24 ENCOUNTER — TELEPHONE (OUTPATIENT)
Dept: SLEEP CENTER | Facility: CLINIC | Age: 83
End: 2018-05-24

## 2018-05-24 NOTE — TELEPHONE ENCOUNTER
Spoke with Javier Richards regarding mild obstructive sleep apnea on study and recommendation for CPAP study  She states that mother has difficulty ambulating and is unsure how she will do for study  Offered sleep consult appointment to discuss  She will speak with her brother who is a physician and call back with how they wish to proceed

## 2018-05-27 ENCOUNTER — HOSPITAL ENCOUNTER (OUTPATIENT)
Dept: SLEEP CENTER | Facility: CLINIC | Age: 83
Discharge: HOME | End: 2018-05-27
Payer: MEDICARE

## 2018-05-27 DIAGNOSIS — G47.33 OSA (OBSTRUCTIVE SLEEP APNEA): ICD-10-CM

## 2018-05-27 PROCEDURE — 95811 POLYSOM 6/>YRS CPAP 4/> PARM: CPT

## 2018-05-28 NOTE — PROGRESS NOTES
Sleep Study Documentation    Pre-Sleep Study       Sleep testing procedure explained to patient:YES    Patient napped prior to study:NO    Caffeine:Dayshift worker after 12PM   Caffeine use:YES- chocolate  6 ounces    Alcohol:Dayshift workers after 5PM: Alcohol use:NO    Typical day for patient:YES       Study Documentation    Treatment     Optimal PAP pressure:  +10  Leak:None  Snore:Eliminated at +10  REM Obtained:no     Supplemental O2: no    Minimum SaO2:  94%  Baseline SaO2:  98%  Minimum SaO2 at final PAP pressure:  95%    PAP masks tried:  Benoit, ResMed Mirage FX nasal, ResMed Airfit F20 full-face    PAP mask choice (final):  ResMed Airfit F20 full-face (small)    Pt had many instances of tugging at her mask throughout the study, but she resumed sleep fairly quickly after each episode  ETCO2:  No    Mode of Therapy:CPAP  CPAP changed to BiPAP:No    EKG abnormalities: no     EEG abnormalities: no    Study Terminated:no    Patient classification: retired       Post-Sleep Study    Medication used at bedtime or during sleep study:NO    Patient reports time it took to fall asleep:20 to 30 minutes    Patient reports waking up during study:3 or more times  Patient reports returning to sleep without difficulty  Patient reports sleeping 4 to 6 hours without dreaming  Patient reports sleep during study:typical    Patient rated sleepiness: Not sleepy or tired    PAP treatment:yes: Post PAP treatment patient reports feeling unchanged and would wear PAP mask at home

## 2018-05-29 ENCOUNTER — OFFICE VISIT (OUTPATIENT)
Dept: GERIATRICS | Facility: CLINIC | Age: 83
End: 2018-05-29
Payer: MEDICARE

## 2018-05-29 VITALS
RESPIRATION RATE: 18 BRPM | OXYGEN SATURATION: 97 % | BODY MASS INDEX: 33.54 KG/M2 | WEIGHT: 157.7 LBS | DIASTOLIC BLOOD PRESSURE: 70 MMHG | HEART RATE: 78 BPM | SYSTOLIC BLOOD PRESSURE: 160 MMHG

## 2018-05-29 DIAGNOSIS — E03.9 HYPOTHYROIDISM, UNSPECIFIED TYPE: Primary | ICD-10-CM

## 2018-05-29 DIAGNOSIS — F02.80 ALZHEIMER'S DEMENTIA WITHOUT BEHAVIORAL DISTURBANCE, UNSPECIFIED TIMING OF DEMENTIA ONSET (HCC): ICD-10-CM

## 2018-05-29 DIAGNOSIS — G47.33 OSA (OBSTRUCTIVE SLEEP APNEA): ICD-10-CM

## 2018-05-29 DIAGNOSIS — R26.9 ABNORMAL GAIT: ICD-10-CM

## 2018-05-29 DIAGNOSIS — I10 ESSENTIAL HYPERTENSION: ICD-10-CM

## 2018-05-29 DIAGNOSIS — G30.9 ALZHEIMER'S DEMENTIA WITHOUT BEHAVIORAL DISTURBANCE, UNSPECIFIED TIMING OF DEMENTIA ONSET (HCC): ICD-10-CM

## 2018-05-29 DIAGNOSIS — E11.9 TYPE 2 DIABETES MELLITUS WITHOUT COMPLICATION, WITH LONG-TERM CURRENT USE OF INSULIN (HCC): ICD-10-CM

## 2018-05-29 DIAGNOSIS — Z79.4 TYPE 2 DIABETES MELLITUS WITHOUT COMPLICATION, WITH LONG-TERM CURRENT USE OF INSULIN (HCC): ICD-10-CM

## 2018-05-29 DIAGNOSIS — G47.33 OSA (OBSTRUCTIVE SLEEP APNEA): Primary | ICD-10-CM

## 2018-05-29 PROCEDURE — 99214 OFFICE O/P EST MOD 30 MIN: CPT | Performed by: NURSE PRACTITIONER

## 2018-05-29 RX ORDER — ACETAMINOPHEN 325 MG/1
325 TABLET ORAL
Status: ON HOLD | COMMUNITY
End: 2018-07-20 | Stop reason: ALTCHOICE

## 2018-05-29 RX ORDER — VALSARTAN 160 MG/1
TABLET ORAL
COMMUNITY
Start: 2018-05-10 | End: 2018-07-24 | Stop reason: HOSPADM

## 2018-05-29 RX ORDER — AMLODIPINE BESYLATE 5 MG/1
10 TABLET ORAL DAILY
COMMUNITY
End: 2018-07-24 | Stop reason: HOSPADM

## 2018-05-29 NOTE — ASSESSMENT & PLAN NOTE
· Norvasc recently started  · Blood pressures continue 120-150s/x  · Continue to monitor, check with manual cuff if SBP>160/x

## 2018-05-29 NOTE — PROGRESS NOTES
Assessment/Plan:    Type 2 diabetes mellitus (HCC)  · Follows with Endo  · -190 with Lantus 40 units, metformin 1000 mg daily  · Continue to monitor and follow up with labs    RAZIA (obstructive sleep apnea)  · Home sleep study completed  · Follow-up sleep study for CPAP completed, results pending    Hypertension  · Norvasc recently started  · Blood pressures continue 120-150s/x  · Continue to monitor, check with manual cuff if SBP>160/x    Alzheimers disease  · Stable  · Cont with 24/7 assist for adl/iadls  · Monitor for MS changes and notify    Abnormal gait  · No reported falls, cont with walker  · PT/OT working with pt  Fall precautions       Diagnoses and all orders for this visit:    Hypothyroidism, unspecified type    Type 2 diabetes mellitus without complication, with long-term current use of insulin (HCC)    RAZIA (obstructive sleep apnea)    Essential hypertension    Other orders  -     acetaminophen (TYLENOL) 325 mg tablet; 325 mg Take two tablet by mouth at 6:30am and 6:30pm  -     amLODIPine (NORVASC) 5 mg tablet; Take 5 mg by mouth daily  -     valsartan (DIOVAN) 160 mg tablet; Give one tablet daily   -     NOVOFINE AUTOCOVER 30G X 8 MM MISC;         Subjective:   CC  Pt here for 2 mth routine- Review chronic conditions  Review BP and BS log while at OV     Patient ID: Sadi Barroso is a 80 y o  female  Patient presents for 2 month follow-up of acute chronic conditions including diabetes, hypertension, ambulatory dysfunction, dementia  Patient denies all complaints  Denies pain, CP/SOB/cough  States she is eating and sleeping well  Denies lower extremity edema  Is pleasantly forgetful  Full per nursing notes patient recently had Norvasc added to her medication regimen for blood pressures of 188/79  She has seen endocrinologist for blood sugars which was improving slightly FBS is about 160s to 180s  She continues to see PT/O T  Is ambulating with walker    She has had no falls per notes  Patient did have home sleep test completed which recommended inpatient sleep study  This was done on Sunday night and results are still pending  Med review, allergies, prb list     Review of Systems   All other systems reviewed and are negative  Objective:      /70 (BP Location: Right arm, Patient Position: Sitting, Cuff Size: Standard)   Pulse 78   Resp 18   Wt 71 5 kg (157 lb 11 2 oz)   SpO2 97%   BMI 33 54 kg/m²          Physical Exam   Constitutional: She appears well-developed and well-nourished  No distress  HENT:   Head: Normocephalic  Cardiovascular: Normal rate and normal heart sounds  Exam reveals no gallop and no friction rub  No murmur heard  Pulmonary/Chest: Effort normal and breath sounds normal  No respiratory distress  She has no wheezes  She has no rales  Abdominal: Soft  Bowel sounds are normal  She exhibits no distension  There is no tenderness  There is no rebound  Musculoskeletal: Normal range of motion  Neurological: She is alert  Oriented to person, partial to place, time, situation  Forgetful   Skin: Skin is warm and dry  She is not diaphoretic  Psychiatric: She has a normal mood and affect  Her speech is normal and behavior is normal  Thought content normal  Cognition and memory are impaired  She exhibits abnormal recent memory  Vitals reviewed

## 2018-05-29 NOTE — ASSESSMENT & PLAN NOTE
· Follows with Endo  · -190 with Lantus 40 units, metformin 1000 mg daily  · Continue to monitor and follow up with labs

## 2018-06-04 ENCOUNTER — TELEPHONE (OUTPATIENT)
Dept: SLEEP CENTER | Facility: CLINIC | Age: 83
End: 2018-06-04

## 2018-06-04 NOTE — TELEPHONE ENCOUNTER
Spoke with daughter regarding consult and CPAP set up  It is difficult to transport patient  Informed that CPAP order thru the sleep center does require a face to face appointment with the ordering doctor  Informed that CPAP could be ordered thru her physician as well which would eliminate an appointment at the sleep center  She will have her brother  Dr Robson Burdick call Dr Ashlie Bermeo to discuss

## 2018-06-04 NOTE — TELEPHONE ENCOUNTER
Patient's son,   Joe Agudelo called  Scheduled Consult with Dr Angel Perrin and CPAP set up 6/12    Rx and data to [facility]

## 2018-06-12 ENCOUNTER — OFFICE VISIT (OUTPATIENT)
Dept: SLEEP CENTER | Facility: CLINIC | Age: 83
End: 2018-06-12
Payer: MEDICARE

## 2018-06-12 ENCOUNTER — HOSPITAL ENCOUNTER (OUTPATIENT)
Dept: SLEEP CENTER | Facility: CLINIC | Age: 83
Discharge: HOME | End: 2018-06-12

## 2018-06-12 VITALS
HEART RATE: 88 BPM | BODY MASS INDEX: 31.65 KG/M2 | WEIGHT: 157 LBS | SYSTOLIC BLOOD PRESSURE: 128 MMHG | DIASTOLIC BLOOD PRESSURE: 70 MMHG | HEIGHT: 59 IN

## 2018-06-12 DIAGNOSIS — G47.33 OSA (OBSTRUCTIVE SLEEP APNEA): Primary | ICD-10-CM

## 2018-06-12 PROCEDURE — 99204 OFFICE O/P NEW MOD 45 MIN: CPT | Performed by: INTERNAL MEDICINE

## 2018-06-12 NOTE — PROGRESS NOTES
Consultation - [facility] : 1930  MRN: [id number]      Assessment:  The patient has moderate obstructive sleep apnea with excessive daytime sleepiness  She also has a history of hypertension, both of which could be related to her RAZIA  She seemed to do well on CPAP at 10 cm and will be set up today through [address] [place] [address] St:  Start CPAP 10 cm    Follow up:  Compliance check      History of Present Illness:   80 y  o female with history of snoring and daytime sleepiness  Home sleep testing demonstrated SEBAS = 15 6 and a subsequent CPAP titration showed a best pressure of 10 cm  The patient has both daytime sleepiness and hypertension      Review of Systems:      Genitourinary none   Cardiology none   Gastrointestinal none   Neurology forgetfulness and difficulty with memory   Constitutional none   Integumentary none   Psychiatry none   Musculoskeletal none   Pulmonary none   ENT none   Endocrine none   Hematological none           Historical Information    Past Medical History:  Dementia, hypertension      Family History: non-contributory      Sleep Schedule: unremarkable    Snoring:    Yes      Witnessed Apnea:    Yes    Medications/Allergies:    Current Outpatient Prescriptions:     acetaminophen (TYLENOL) 325 mg tablet, 325 mg Take two tablet by mouth at 6:30am and 6:30pm, Disp: , Rfl:     acetaminophen (TYLENOL) 500 mg tablet, TAKE TWO TABLETS BY MOUTH AT 6:30AM AND 6:30PM , Disp: , Rfl:     amLODIPine (NORVASC) 5 mg tablet, Take 5 mg by mouth daily, Disp: , Rfl:     bromocriptine (PARLODEL) 2 5 mg tablet, TAKE 1/4 TABLET BY MOUTH AT BEDTIME , Disp: , Rfl:     Cholecalciferol (VITAMIN D3) 74662 units TABS, Take by mouth daily, Disp: , Rfl:     citalopram (CeleXA) 10 mg tablet, TAKE 1/2 TABLET BY MOUTH DAILY , Disp: , Rfl:     cyanocobalamin (VITAMIN B-12) 1,000 mcg tablet, Take by mouth daily, Disp: , Rfl:     LANTUS SOLOSTAR injection pen 100 units/mL, INJECT 28 UNITS SUBCU , Disp: , Rfl:     metFORMIN (GLUCOPHAGE) 1000 MG tablet, Take 1 tablet by mouth daily, Disp: , Rfl:     methylphenidate (RITALIN) 10 mg tablet, TAKE 1/2 TAB DAILY IN EARLY AM , Disp: , Rfl:     NOVOFINE AUTOCOVER 30G X 8 MM MISC, , Disp: , Rfl:     valsartan (DIOVAN) 160 mg tablet, Give one tablet daily , Disp: , Rfl:         No notes on file                  Objective:    Vital Signs:   Vitals:    06/12/18 1500   BP: 128/70   BP Location: Right arm   Pulse: 88   Weight: 71 2 kg (157 lb)   Height: 4' 11" (1 499 m)            [place] Sleepiness Scale: Total score: 20    Physical Exam:    General: Alert, appropriate, cooperative, overweight    Head: NC/AT, no retrognathia    Nose: No septal deviation, nares partially obstructed, mucosa normal    Throat: Airway lumen narrowed, tongue base severely thickened, no tonsils visualized    Neck: Normal, no thyromegaly or lymphadenopathy, no JVD    Heart: RR, normal S1 and S2, 2/6 systolic murmur consistent with aortic sclerosis    Chest: Clear bilaterally    Extremity: No clubbing, cyanosis, 2+ edema    Skin: Warm, dry    Neuro: No motor abnormalities, cranial nerves appear intact    Sleep Study Results:   As above  PAP Pressure: CPAP: 10 cm  DME Provider: [employer]    Counseling / Coordination of Care  Total clinic time spent today 25 minutes  Greater than 50% of total time was spent with the patient and / or family counseling and / or coordination of care  A description of the counseling / coordination of care: We discussed the pathophysiology of obstructive sleep apnea as well as the rationale for positive airway pressure therapy  PREETI Acosta    Board Certified Sleep Specialist

## 2018-07-17 ENCOUNTER — APPOINTMENT (EMERGENCY)
Dept: RADIOLOGY | Facility: HOSPITAL | Age: 83
DRG: 481 | End: 2018-07-17
Payer: MEDICARE

## 2018-07-17 ENCOUNTER — HOSPITAL ENCOUNTER (INPATIENT)
Facility: HOSPITAL | Age: 83
LOS: 2 days | DRG: 481 | End: 2018-07-20
Attending: EMERGENCY MEDICINE | Admitting: ORTHOPAEDIC SURGERY
Payer: MEDICARE

## 2018-07-17 ENCOUNTER — APPOINTMENT (OUTPATIENT)
Dept: RADIOLOGY | Facility: HOSPITAL | Age: 83
DRG: 481 | End: 2018-07-17
Payer: MEDICARE

## 2018-07-17 DIAGNOSIS — R73.9 HYPERGLYCEMIA: ICD-10-CM

## 2018-07-17 DIAGNOSIS — Z48.89 AFTERCARE FOLLOWING SURGERY FOR INJURY OR TRAUMA: ICD-10-CM
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
## 2018-07-17 DIAGNOSIS — S72.001A CLOSED FRACTURE OF RIGHT HIP, INITIAL ENCOUNTER (HCC): ICD-10-CM

## 2018-07-17 LAB
ABO GROUP BLD: NORMAL
ALBUMIN SERPL BCP-MCNC: 3.4 G/DL (ref 3.5–5)
ALP SERPL-CCNC: 93 U/L (ref 46–116)
ALT SERPL W P-5'-P-CCNC: 30 U/L (ref 12–78)
ANION GAP SERPL CALCULATED.3IONS-SCNC: 6 MMOL/L (ref 4–13)
APTT PPP: 37 SECONDS (ref 24–36)
AST SERPL W P-5'-P-CCNC: 28 U/L (ref 5–45)
ATRIAL RATE: 75 BPM
BASOPHILS # BLD AUTO: 0.05 THOUSANDS/ΜL (ref 0–0.1)
BASOPHILS NFR BLD AUTO: 1 % (ref 0–1)
BILIRUB SERPL-MCNC: 0.41 MG/DL (ref 0.2–1)
BLD GP AB SCN SERPL QL: NEGATIVE
BUN SERPL-MCNC: 18 MG/DL (ref 5–25)
CALCIUM SERPL-MCNC: 11.3 MG/DL (ref 8.3–10.1)
CHLORIDE SERPL-SCNC: 104 MMOL/L (ref 100–108)
CO2 SERPL-SCNC: 24 MMOL/L (ref 21–32)
CREAT SERPL-MCNC: 0.82 MG/DL (ref 0.6–1.3)
EOSINOPHIL # BLD AUTO: 0.25 THOUSAND/ΜL (ref 0–0.61)
EOSINOPHIL NFR BLD AUTO: 3 % (ref 0–6)
ERYTHROCYTE [DISTWIDTH] IN BLOOD BY AUTOMATED COUNT: 14.3 % (ref 11.6–15.1)
GFR SERPL CREATININE-BSD FRML MDRD: 65 ML/MIN/1.73SQ M
GLUCOSE SERPL-MCNC: 250 MG/DL (ref 65–140)
HCT VFR BLD AUTO: 35.9 % (ref 34.8–46.1)
HGB BLD-MCNC: 11.6 G/DL (ref 11.5–15.4)
IMM GRANULOCYTES # BLD AUTO: 0.03 THOUSAND/UL (ref 0–0.2)
IMM GRANULOCYTES NFR BLD AUTO: 0 % (ref 0–2)
INR PPP: 1.06 (ref 0.86–1.17)
LYMPHOCYTES # BLD AUTO: 1.97 THOUSANDS/ΜL (ref 0.6–4.47)
LYMPHOCYTES NFR BLD AUTO: 25 % (ref 14–44)
MCH RBC QN AUTO: 29 PG (ref 26.8–34.3)
MCHC RBC AUTO-ENTMCNC: 32.3 G/DL (ref 31.4–37.4)
MCV RBC AUTO: 90 FL (ref 82–98)
MONOCYTES # BLD AUTO: 0.47 THOUSAND/ΜL (ref 0.17–1.22)
MONOCYTES NFR BLD AUTO: 6 % (ref 4–12)
NEUTROPHILS # BLD AUTO: 4.98 THOUSANDS/ΜL (ref 1.85–7.62)
NEUTS SEG NFR BLD AUTO: 65 % (ref 43–75)
NRBC BLD AUTO-RTO: 0 /100 WBCS
P AXIS: 71 DEGREES
PLATELET # BLD AUTO: 164 THOUSANDS/UL (ref 149–390)
PMV BLD AUTO: 9.4 FL (ref 8.9–12.7)
POTASSIUM SERPL-SCNC: 4.3 MMOL/L (ref 3.5–5.3)
PR INTERVAL: 146 MS
PROT SERPL-MCNC: 7.4 G/DL (ref 6.4–8.2)
PROTHROMBIN TIME: 13.9 SECONDS (ref 11.8–14.2)
QRS AXIS: 15 DEGREES
QRSD INTERVAL: 74 MS
QT INTERVAL: 364 MS
QTC INTERVAL: 406 MS
RBC # BLD AUTO: 4 MILLION/UL (ref 3.81–5.12)
RH BLD: POSITIVE
SODIUM SERPL-SCNC: 134 MMOL/L (ref 136–145)
SPECIMEN EXPIRATION DATE: NORMAL
T WAVE AXIS: 75 DEGREES
TROPONIN I SERPL-MCNC: <0.02 NG/ML
VENTRICULAR RATE: 75 BPM
WBC # BLD AUTO: 7.75 THOUSAND/UL (ref 4.31–10.16)

## 2018-07-17 PROCEDURE — 86850 RBC ANTIBODY SCREEN: CPT | Performed by: ORTHOPAEDIC SURGERY

## 2018-07-17 PROCEDURE — 85610 PROTHROMBIN TIME: CPT | Performed by: EMERGENCY MEDICINE

## 2018-07-17 PROCEDURE — 87081 CULTURE SCREEN ONLY: CPT | Performed by: ORTHOPAEDIC SURGERY

## 2018-07-17 PROCEDURE — 73502 X-RAY EXAM HIP UNI 2-3 VIEWS: CPT

## 2018-07-17 PROCEDURE — 86900 BLOOD TYPING SEROLOGIC ABO: CPT | Performed by: ORTHOPAEDIC SURGERY

## 2018-07-17 PROCEDURE — 72125 CT NECK SPINE W/O DYE: CPT

## 2018-07-17 PROCEDURE — 93010 ELECTROCARDIOGRAM REPORT: CPT | Performed by: INTERNAL MEDICINE

## 2018-07-17 PROCEDURE — 85025 COMPLETE CBC W/AUTO DIFF WBC: CPT | Performed by: EMERGENCY MEDICINE

## 2018-07-17 PROCEDURE — 85730 THROMBOPLASTIN TIME PARTIAL: CPT | Performed by: EMERGENCY MEDICINE

## 2018-07-17 PROCEDURE — 80053 COMPREHEN METABOLIC PANEL: CPT | Performed by: EMERGENCY MEDICINE

## 2018-07-17 PROCEDURE — 84484 ASSAY OF TROPONIN QUANT: CPT | Performed by: EMERGENCY MEDICINE

## 2018-07-17 PROCEDURE — 86901 BLOOD TYPING SEROLOGIC RH(D): CPT | Performed by: ORTHOPAEDIC SURGERY

## 2018-07-17 PROCEDURE — 71045 X-RAY EXAM CHEST 1 VIEW: CPT

## 2018-07-17 PROCEDURE — 73552 X-RAY EXAM OF FEMUR 2/>: CPT

## 2018-07-17 PROCEDURE — 99285 EMERGENCY DEPT VISIT HI MDM: CPT

## 2018-07-17 PROCEDURE — 70450 CT HEAD/BRAIN W/O DYE: CPT

## 2018-07-17 PROCEDURE — 96374 THER/PROPH/DIAG INJ IV PUSH: CPT

## 2018-07-17 PROCEDURE — 73030 X-RAY EXAM OF SHOULDER: CPT

## 2018-07-17 PROCEDURE — 93005 ELECTROCARDIOGRAM TRACING: CPT

## 2018-07-17 PROCEDURE — 36415 COLL VENOUS BLD VENIPUNCTURE: CPT

## 2018-07-17 RX ORDER — LIDOCAINE 50 MG/G
1 PATCH TOPICAL DAILY
COMMUNITY
End: 2018-08-13 | Stop reason: HOSPADM

## 2018-07-17 RX ORDER — FENTANYL CITRATE 50 UG/ML
25 INJECTION, SOLUTION INTRAMUSCULAR; INTRAVENOUS ONCE
Status: COMPLETED | OUTPATIENT
Start: 2018-07-17 | End: 2018-07-17

## 2018-07-17 RX ORDER — GABAPENTIN 100 MG/1
100 CAPSULE ORAL
Status: DISCONTINUED | OUTPATIENT
Start: 2018-07-17 | End: 2018-07-20 | Stop reason: HOSPADM

## 2018-07-17 RX ORDER — OXYCODONE HYDROCHLORIDE 10 MG/1
10 TABLET ORAL EVERY 4 HOURS PRN
Status: DISCONTINUED | OUTPATIENT
Start: 2018-07-17 | End: 2018-07-17

## 2018-07-17 RX ORDER — GABAPENTIN 100 MG/1
100 CAPSULE ORAL
COMMUNITY
End: 2018-07-24 | Stop reason: HOSPADM

## 2018-07-17 RX ORDER — CITALOPRAM 10 MG/1
10 TABLET ORAL DAILY
Status: DISCONTINUED | OUTPATIENT
Start: 2018-07-18 | End: 2018-07-20 | Stop reason: HOSPADM

## 2018-07-17 RX ORDER — AMLODIPINE BESYLATE 10 MG/1
10 TABLET ORAL DAILY
Status: DISCONTINUED | OUTPATIENT
Start: 2018-07-18 | End: 2018-07-20 | Stop reason: HOSPADM

## 2018-07-17 RX ORDER — OXYCODONE HYDROCHLORIDE 5 MG/1
5 TABLET ORAL EVERY 4 HOURS PRN
Status: DISCONTINUED | OUTPATIENT
Start: 2018-07-17 | End: 2018-07-17

## 2018-07-17 RX ORDER — SODIUM CHLORIDE, SODIUM LACTATE, POTASSIUM CHLORIDE, CALCIUM CHLORIDE 600; 310; 30; 20 MG/100ML; MG/100ML; MG/100ML; MG/100ML
75 INJECTION, SOLUTION INTRAVENOUS CONTINUOUS
Status: DISCONTINUED | OUTPATIENT
Start: 2018-07-18 | End: 2018-07-20 | Stop reason: HOSPADM

## 2018-07-17 RX ORDER — METHYLPHENIDATE HYDROCHLORIDE 10 MG/1
5 TABLET ORAL DAILY
Status: DISCONTINUED | OUTPATIENT
Start: 2018-07-18 | End: 2018-07-20 | Stop reason: HOSPADM

## 2018-07-17 RX ORDER — DOCUSATE SODIUM 100 MG/1
100 CAPSULE, LIQUID FILLED ORAL 2 TIMES DAILY
Status: DISCONTINUED | OUTPATIENT
Start: 2018-07-17 | End: 2018-07-20 | Stop reason: HOSPADM

## 2018-07-17 RX ORDER — VALSARTAN 40 MG/1
80 TABLET ORAL 2 TIMES DAILY
Status: DISCONTINUED | OUTPATIENT
Start: 2018-07-17 | End: 2018-07-20 | Stop reason: HOSPADM

## 2018-07-17 RX ORDER — OXYCODONE HYDROCHLORIDE 5 MG/1
5 TABLET ORAL EVERY 4 HOURS PRN
Status: DISCONTINUED | OUTPATIENT
Start: 2018-07-17 | End: 2018-07-20 | Stop reason: HOSPADM

## 2018-07-17 RX ORDER — OXYCODONE HYDROCHLORIDE 5 MG/1
2.5 TABLET ORAL EVERY 4 HOURS PRN
Status: DISCONTINUED | OUTPATIENT
Start: 2018-07-17 | End: 2018-07-20 | Stop reason: HOSPADM

## 2018-07-17 RX ORDER — LIDOCAINE 50 MG/G
1 PATCH TOPICAL DAILY
Status: DISCONTINUED | OUTPATIENT
Start: 2018-07-18 | End: 2018-07-20 | Stop reason: HOSPADM

## 2018-07-17 RX ORDER — CALCIUM CARBONATE 200(500)MG
1000 TABLET,CHEWABLE ORAL DAILY PRN
Status: DISCONTINUED | OUTPATIENT
Start: 2018-07-17 | End: 2018-07-20 | Stop reason: HOSPADM

## 2018-07-17 RX ORDER — ACETAMINOPHEN 325 MG/1
975 TABLET ORAL EVERY 8 HOURS SCHEDULED
Status: DISCONTINUED | OUTPATIENT
Start: 2018-07-17 | End: 2018-07-20 | Stop reason: HOSPADM

## 2018-07-17 RX ORDER — AMOXICILLIN 250 MG
2 CAPSULE ORAL DAILY
Status: DISCONTINUED | OUTPATIENT
Start: 2018-07-18 | End: 2018-07-20 | Stop reason: HOSPADM

## 2018-07-17 RX ORDER — METHOCARBAMOL 500 MG/1
500 TABLET, FILM COATED ORAL EVERY 6 HOURS PRN
Status: DISCONTINUED | OUTPATIENT
Start: 2018-07-17 | End: 2018-07-20 | Stop reason: HOSPADM

## 2018-07-17 RX ADMIN — GABAPENTIN 100 MG: 100 CAPSULE ORAL at 22:02

## 2018-07-17 RX ADMIN — DOCUSATE SODIUM 100 MG: 100 CAPSULE, LIQUID FILLED ORAL at 22:02

## 2018-07-17 RX ADMIN — SODIUM CHLORIDE, SODIUM LACTATE, POTASSIUM CHLORIDE, AND CALCIUM CHLORIDE 75 ML/HR: .6; .31; .03; .02 INJECTION, SOLUTION INTRAVENOUS at 23:17

## 2018-07-17 RX ADMIN — OXYCODONE HYDROCHLORIDE 10 MG: 10 TABLET ORAL at 20:52

## 2018-07-17 RX ADMIN — ACETAMINOPHEN 975 MG: 325 TABLET ORAL at 22:02

## 2018-07-17 RX ADMIN — VALSARTAN 80 MG: 40 TABLET, FILM COATED ORAL at 23:22

## 2018-07-17 RX ADMIN — HYDROMORPHONE HYDROCHLORIDE 0.2 MG: 1 INJECTION, SOLUTION INTRAMUSCULAR; INTRAVENOUS; SUBCUTANEOUS at 22:02

## 2018-07-17 RX ADMIN — FENTANYL CITRATE 25 MCG: 50 INJECTION, SOLUTION INTRAMUSCULAR; INTRAVENOUS at 15:18

## 2018-07-17 NOTE — ED PROVIDER NOTES
History  Chief Complaint   Patient presents with    Hip Injury     pt resided in dementia unit at [facility], pt stood and lost footing and grabbed tablecloth  pt was a witnessed fall onto R hip  R hip is noted to be internally rotated  pts son stated pt was found face first on floor  per pts son, who is a physician, pt is Maryse Jama  89  HPI     a 80-year-old female from nursing facility presents with right hip pain after a witnessed mechanical fall  She was getting up from lunch and fell as she was reaching for her walker  She did not lose consciousness  She denies any pain other than in her right hip  Prior to Admission Medications   Prescriptions Last Dose Informant Patient Reported? Taking?    Cholecalciferol (VITAMIN D3) 06226 units TABS 2018 at Unknown time  Yes Yes   Sig: Take by mouth daily   LANTUS SOLOSTAR injection pen 100 units/mL 2018 at Unknown time  Yes Yes   Si Units 2 (two) times a day INJECT 28 UNITS SUBCU    NOVOFINE AUTOCOVER 30G X 8 MM MISC   Yes No   acetaminophen (TYLENOL) 325 mg tablet   Yes No   Si mg Take two tablet by mouth at 6:30am and 6:30pm   acetaminophen (TYLENOL) 500 mg tablet   Yes No   Sig: TAKE TWO TABLETS BY MOUTH AT 6:30AM AND 6:30PM    amLODIPine (NORVASC) 5 mg tablet 2018 at Unknown time  Yes Yes   Sig: Take 10 mg by mouth daily     bromocriptine (PARLODEL) 2 5 mg tablet 2018 at Unknown time  Yes Yes   Sig: TAKE 1/4 TABLET BY MOUTH AT BEDTIME    citalopram (CeleXA) 10 mg tablet 2018 at Unknown time  Yes Yes   Sig: TAKE 1/2 TABLET BY MOUTH DAILY    cyanocobalamin (VITAMIN B-12) 1,000 mcg tablet 2018 at Unknown time  Yes Yes   Sig: Take by mouth daily   gabapentin (NEURONTIN) 100 mg capsule 2018 at Unknown time  Yes Yes   Sig: Take 100 mg by mouth daily at bedtime   lidocaine (LIDODERM) 5 % 2018 at Unknown time  Yes Yes   Sig: Place 1 patch on the skin daily Remove & Discard patch within 12 hours or as directed by MD   metFORMIN (GLUCOPHAGE) 1000 MG tablet 7/17/2018 at Unknown time  Yes Yes   Sig: Take 1 tablet by mouth daily   methylphenidate (RITALIN) 10 mg tablet 7/17/2018 at Unknown time  Yes Yes   Sig: TAKE 1/2 TAB DAILY IN EARLY AM    valsartan (DIOVAN) 160 mg tablet 7/17/2018 at Unknown time  Yes Yes   Sig: Give one tablet daily       Facility-Administered Medications: None       Past Medical History:   Diagnosis Date    Ankle fracture     Dementia     [facility] ([facility] )     Hypertension     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[facility] )        Past Surgical History:   Procedure Laterality Date    CARPAL TUNNEL RELEASE         History reviewed  No pertinent family history  I have reviewed and agree with the history as documented  Social History   Substance Use Topics    Smoking status: Never Smoker    Smokeless tobacco: Never Used    Alcohol use No        Review of Systems   Constitutional: Negative  Negative for fever  HENT: Negative  Respiratory: Negative  Cardiovascular: Negative  Gastrointestinal: Negative  Negative for abdominal pain  Genitourinary: Negative  Musculoskeletal: Positive for arthralgias  Skin: Negative  Neurological: Negative  Psychiatric/Behavioral:        Baseline confusion       Physical Exam  Physical Exam   Constitutional: She appears well-developed and well-nourished  HENT:   Head: Normocephalic and atraumatic  Nose: Nose normal    Mouth/Throat: Oropharynx is clear and moist    Eyes: EOM are normal  Pupils are equal, round, and reactive to light  Neck: Normal range of motion  No spinous process tenderness and no muscular tenderness present  Normal range of motion present  Cardiovascular: Normal rate and regular rhythm  Pulmonary/Chest: Effort normal and breath sounds normal    Abdominal: Soft  Bowel sounds are normal  There is no tenderness  Musculoskeletal: She exhibits tenderness          Right hip: She exhibits decreased range of motion, tenderness and bony tenderness  Pedal pulses present  Neurological: She is alert  Skin: Skin is warm and dry  Psychiatric: She has a normal mood and affect  Her behavior is normal    Nursing note and vitals reviewed        Vital Signs  ED Triage Vitals   Temp Pulse Respirations Blood Pressure SpO2   -- 07/17/18 1445 07/17/18 1445 07/17/18 1445 07/17/18 1445    74 15 153/65 93 %      Temp src Heart Rate Source Patient Position - Orthostatic VS BP Location FiO2 (%)   -- 07/17/18 1755 07/17/18 1755 07/17/18 1755 --    Monitor Lying Right arm       Pain Score       --                  Vitals:    07/17/18 1445 07/17/18 1545 07/17/18 1630 07/17/18 1755   BP: 153/65 149/64  147/67   Pulse: 74 72 74 81   Patient Position - Orthostatic VS:    Lying       Visual Acuity  Visual Acuity      Most Recent Value   L Pupil Size (mm)  3   R Pupil Size (mm)  3   L Pupil Shape  Round   R Pupil Shape  Round          ED Medications  Medications    EMS REPLENISHMENT MED ( Does not apply Given to EMS 7/17/18 1422)   fentanyl citrate (PF) 100 MCG/2ML 25 mcg (25 mcg Intravenous Given 7/17/18 1518)       Diagnostic Studies  Results Reviewed     Procedure Component Value Units Date/Time    Troponin I [[id number]]  (Normal) Collected:  07/17/18 1435    Lab Status:  Final result Specimen:  Blood from Arm, Left Updated:  07/17/18 1510     Troponin I <0 02 ng/mL     Protime-INR [[id number]]  (Normal) Collected:  07/17/18 1435    Lab Status:  Final result Specimen:  Blood from Arm, Left Updated:  07/17/18 1509     Protime 13 9 seconds      INR 1 06    APTT [[id number]]  (Abnormal) Collected:  07/17/18 1435    Lab Status:  Final result Specimen:  Blood from Arm, Left Updated:  07/17/18 1509     PTT 37 (H) seconds     Comprehensive metabolic panel [[id number]]  (Abnormal) Collected:  07/17/18 1435    Lab Status:  Final result Specimen:  Blood from Arm, Left Updated:  07/17/18 1508     Sodium 134 (L) mmol/L      Potassium 4 3 mmol/L      Chloride 104 mmol/L CO2 24 mmol/L      Anion Gap 6 mmol/L      BUN 18 mg/dL      Creatinine 0 82 mg/dL      Glucose 250 (H) mg/dL      Calcium 11 3 (H) mg/dL      AST 28 U/L      ALT 30 U/L      Alkaline Phosphatase 93 U/L      Total Protein 7 4 g/dL      Albumin 3 4 (L) g/dL      Total Bilirubin 0 41 mg/dL      eGFR 65 ml/min/1 73sq m     Narrative:         National Kidney Disease Education Program recommendations are as follows:  GFR calculation is accurate only with a steady state creatinine  Chronic Kidney disease less than 60 ml/min/1 73 sq  meters  Kidney failure less than 15 ml/min/1 73 sq  meters  CBC and differential [[id number]] Collected:  07/17/18 1435    Lab Status:  Final result Specimen:  Blood from Arm, Left Updated:  07/17/18 1445     WBC 7 75 Thousand/uL      RBC 4 00 Million/uL      Hemoglobin 11 6 g/dL      Hematocrit 35 9 %      MCV 90 fL      MCH 29 0 pg      MCHC 32 3 g/dL      RDW 14 3 %      MPV 9 4 fL      Platelets 529 Thousands/uL      nRBC 0 /100 WBCs      Neutrophils Relative 65 %      Immat GRANS % 0 %      Lymphocytes Relative 25 %      Monocytes Relative 6 %      Eosinophils Relative 3 %      Basophils Relative 1 %      Neutrophils Absolute 4 98 Thousands/µL      Immature Grans Absolute 0 03 Thousand/uL      Lymphocytes Absolute 1 97 Thousands/µL      Monocytes Absolute 0 47 Thousand/µL      Eosinophils Absolute 0 25 Thousand/µL      Basophils Absolute 0 05 Thousands/µL                  XR chest 1 view portable   Final Result by Cathi Solis MD (07/17 1630)      Cardiomegaly with central fullness of the pulmonary vasculature, may represent chronic congestive changes  No acute findings  Workstation performed: [id number]         XR hip/pelv 2-3 vws right if performed   Final Result by Cathi Solis MD (07/17 1628)      Intertrochanteric fracture of the right hip  The study was marked in [facility] for immediate notification              Workstation performed: [id number]         CT spine cervical without contrast   Final Result by Gina Welch MD (07/17 1627)      1  No cervical spine fracture or traumatic malalignment  2   Mosaic attenuation at the lung apices  This is nonspecific though clinical correlation for pulmonary edema or small airways inflammatory disease recommended  Workstation performed: [id number]         XR shoulder 2+ views RIGHT   Final Result by Trena Denny MD (07/17 1625)      Prior right shoulder replacement  No acute osseous abnormality  Workstation performed: [id number]         CT head without contrast   Final Result by Gina Welch MD (07/17 1619)      No acute intracranial abnormality  Microangiopathic changes  Workstation performed: [id number]                    Procedures  Procedures       Phone Contacts  ED Phone Contact    ED Course           Identification of Seniors at Risk      Most Recent Value   (ISAR) Identification of Seniors at Risk   Before the illness or injury that brought you to the Emergency, did you need someone to help you on a regular basis? 1 Filed at: 07/17/2018 1421   In the last 24 hours, have you needed more help than usual?  1 Filed at: 07/17/2018 1421   Have you been hospitalized for one or more nights during the past 6 months? 1 Filed at: 07/17/2018 1421   In general, do you see well?  0 Filed at: 07/17/2018 1421   In general, do you have serious problems with your memory? 1 Filed at: 07/17/2018 1421   Do you take more than three different medications every day?   1 Filed at: 07/17/2018 1421   ISAR Score  5 Filed at: 07/17/2018 1421                          MDM  Number of Diagnoses or Management Options  Closed fracture of right hip, initial encounter [facility]): new and does not require workup  Fall, initial encounter: new and does not require workup  Hyperglycemia: established and worsening  Diagnosis management comments: D/w ortho resident who agreed with admission under   Venita Lpóez Time    Disposition  Final diagnoses:   Fall, initial encounter   Closed fracture of right hip, initial encounter ([facility] )   Hyperglycemia     Time reflects when diagnosis was documented in both MDM as applicable and the Disposition within this note     Time User Action Codes Description Comment    7/17/2018  5:04 PM Brittni Bermeo Add [O75  Cleariana Paula Fall, initial encounter     7/17/2018  5:04 PM Arely Ontiveros Mass Add [S72 001A] Closed fracture of right hip, initial encounter ([facility] )     7/17/2018  5:53 PM Arely Ontiveros Mass Add [R73 9] Hyperglycemia       ED Disposition     ED Disposition Condition Comment    Admit  Case was discussed with ortho and the patient's admission status was agreed to be Admission Status: observation status to the service of Dr Comer Kayser   Follow-up Information    None         Patient's Medications   Discharge Prescriptions    No medications on file     No discharge procedures on file      ED Provider  Electronically Signed by           Elva Palma DO  07/17/18 8668

## 2018-07-17 NOTE — CONSULTS
Orthopedics   [place] Polmata 80 y o  female MRN: [id number]  Unit/Bed#: CW3 351-01      Chief Complaint:   right hip pain    HPI:   80 y  o female with history of dementia and ambulates with walker status post fall this afternoon complaining of right hip pain and inability to bear weight  She was standing up after eating lunch and reaching for her walker and falling  She has never hurt this hip before, and she also complains of right shoulder pain  She currently resides at [facility]  Review Of Systems:   · Skin: Normal  · Neuro: See HPI  · Musculoskeletal: See HPI  · 14 point review of systems negative except as stated above     Past Medical History:   Past Medical History:   Diagnosis Date    Ankle fracture     Dementia     [facility]     Hypertension     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[facility] )     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[facility] )        Past Surgical History:   Past Surgical History:   Procedure Laterality Date    CARPAL TUNNEL RELEASE         Family History:  Family history reviewed and non-contributory  History reviewed  No pertinent family history  Social History:  Social History     Social History    Marital status:      Spouse name: N/A    Number of children: N/A    Years of education: N/A     Social History Main Topics    Smoking status: Never Smoker    Smokeless tobacco: Never Used    Alcohol use No    Drug use: No    Sexual activity: Not Asked     Other Topics Concern    None     Social History Narrative    None       Allergies:    Allergies   Allergen Reactions    Amoxicillin     Ibuprofen            Labs:    0  Lab Value Date/Time   HCT 35 9 07/17/2018 1435   HCT 41 3 05/12/2017 0924   HCT 41 9 11/14/2016 0825   HCT 43 3 08/17/2015 0931   HCT 42 0 02/27/2015 0931   HCT 40 3 10/16/2014 0954   HGB 11 6 07/17/2018 1435   HGB 13 6 05/12/2017 0924   HGB 13 9 11/14/2016 0825   HGB 14 6 08/17/2015 0931   HGB 13 6 02/27/2015 0931   HGB 12 9 10/16/2014 0954   INR 1 06 07/17/2018 1435   WBC 7 75 07/17/2018 1435   WBC 7 12 05/12/2017 0924   WBC 6 33 11/14/2016 0825   WBC 6 84 08/17/2015 0931   WBC 5 79 02/27/2015 0931   WBC 5 25 10/16/2014 0954       Meds:  No current facility-administered medications for this encounter  Blood Culture:   No results found for: BLOODCX    Wound Culture:   No results found for: WOUNDCULT    Ins and Outs:  No intake/output data recorded  Physical Exam:   /69   Pulse 82   Resp 17   SpO2 96%   Gen: Alert and lying in bed in pain  HEENT: EOMI, eyes clear, moist mucus membranes, hearing intact  Respiratory: Bilateral chest rise  No audible wheezing found  Cardiovascular: No audible murmurs  Abdomen: soft nontender/nondistended  Musculoskeletal: right lower extremity  · Skin intact and RLE held in internal rotation  · Tender to palpation over hip  · Positive log roll  · Sensation intact L2-S1  · Positive ankle dorsi/plantar flexion, EHL/FHL  · 2+ DP pulse    Radiology:   I personally reviewed the films  X-rays right hip shows Intertrochanteric femur fracture    Assessment:  80 y  o female status post fall with rightIntertrochanteric femur fracture    Plan:   · Non weight bearing right lower extremity  · Analgesics for pain  · NPO at midnight  · Gómez Catheter insertion  · Medicine consult for all medical management and preoperative risk stratification  · To OR for IM nail femur fracture of Intertrochanteric femur fracture    Nell Venegas MD

## 2018-07-18 ENCOUNTER — ANESTHESIA (OUTPATIENT)
Dept: PERIOP | Facility: HOSPITAL | Age: 83
DRG: 481 | End: 2018-07-18
Payer: MEDICARE

## 2018-07-18 ENCOUNTER — ANESTHESIA EVENT (OUTPATIENT)
Dept: PERIOP | Facility: HOSPITAL | Age: 83
DRG: 481 | End: 2018-07-18
Payer: MEDICARE

## 2018-07-18 ENCOUNTER — APPOINTMENT (OUTPATIENT)
Dept: RADIOLOGY | Facility: HOSPITAL | Age: 83
DRG: 481 | End: 2018-07-18
Payer: MEDICARE

## 2018-07-18 LAB
ANION GAP SERPL CALCULATED.3IONS-SCNC: 8 MMOL/L (ref 4–13)
APTT PPP: 37 SECONDS (ref 24–36)
BASOPHILS # BLD AUTO: 0.05 THOUSANDS/ΜL (ref 0–0.1)
BASOPHILS NFR BLD AUTO: 1 % (ref 0–1)
BUN SERPL-MCNC: 15 MG/DL (ref 5–25)
CALCIUM SERPL-MCNC: 11 MG/DL (ref 8.3–10.1)
CHLORIDE SERPL-SCNC: 104 MMOL/L (ref 100–108)
CO2 SERPL-SCNC: 24 MMOL/L (ref 21–32)
CREAT SERPL-MCNC: 0.69 MG/DL (ref 0.6–1.3)
EOSINOPHIL # BLD AUTO: 0.14 THOUSAND/ΜL (ref 0–0.61)
EOSINOPHIL NFR BLD AUTO: 2 % (ref 0–6)
ERYTHROCYTE [DISTWIDTH] IN BLOOD BY AUTOMATED COUNT: 14.4 % (ref 11.6–15.1)
EST. AVERAGE GLUCOSE BLD GHB EST-MCNC: 186 MG/DL
GFR SERPL CREATININE-BSD FRML MDRD: 79 ML/MIN/1.73SQ M
GLUCOSE P FAST SERPL-MCNC: 215 MG/DL (ref 65–99)
GLUCOSE SERPL-MCNC: 198 MG/DL (ref 65–140)
GLUCOSE SERPL-MCNC: 215 MG/DL (ref 65–140)
GLUCOSE SERPL-MCNC: 220 MG/DL (ref 65–140)
GLUCOSE SERPL-MCNC: 223 MG/DL (ref 65–140)
GLUCOSE SERPL-MCNC: 228 MG/DL (ref 65–140)
GLUCOSE SERPL-MCNC: 277 MG/DL (ref 65–140)
GLUCOSE SERPL-MCNC: 285 MG/DL (ref 65–140)
HBA1C MFR BLD: 8.1 % (ref 4.2–6.3)
HCT VFR BLD AUTO: 37.4 % (ref 34.8–46.1)
HGB BLD-MCNC: 12.1 G/DL (ref 11.5–15.4)
IMM GRANULOCYTES # BLD AUTO: 0.04 THOUSAND/UL (ref 0–0.2)
IMM GRANULOCYTES NFR BLD AUTO: 0 % (ref 0–2)
INR PPP: 1.11 (ref 0.86–1.17)
LYMPHOCYTES # BLD AUTO: 1.49 THOUSANDS/ΜL (ref 0.6–4.47)
LYMPHOCYTES NFR BLD AUTO: 16 % (ref 14–44)
MCH RBC QN AUTO: 28.8 PG (ref 26.8–34.3)
MCHC RBC AUTO-ENTMCNC: 32.4 G/DL (ref 31.4–37.4)
MCV RBC AUTO: 89 FL (ref 82–98)
MONOCYTES # BLD AUTO: 0.59 THOUSAND/ΜL (ref 0.17–1.22)
MONOCYTES NFR BLD AUTO: 6 % (ref 4–12)
NEUTROPHILS # BLD AUTO: 7.18 THOUSANDS/ΜL (ref 1.85–7.62)
NEUTS SEG NFR BLD AUTO: 75 % (ref 43–75)
NRBC BLD AUTO-RTO: 0 /100 WBCS
PLATELET # BLD AUTO: 178 THOUSANDS/UL (ref 149–390)
PMV BLD AUTO: 9.3 FL (ref 8.9–12.7)
POTASSIUM SERPL-SCNC: 4.3 MMOL/L (ref 3.5–5.3)
PROTHROMBIN TIME: 14.4 SECONDS (ref 11.8–14.2)
RBC # BLD AUTO: 4.2 MILLION/UL (ref 3.81–5.12)
SODIUM SERPL-SCNC: 136 MMOL/L (ref 136–145)
WBC # BLD AUTO: 9.49 THOUSAND/UL (ref 4.31–10.16)

## 2018-07-18 PROCEDURE — C1769 GUIDE WIRE: HCPCS | Performed by: ORTHOPAEDIC SURGERY

## 2018-07-18 PROCEDURE — C1713 ANCHOR/SCREW BN/BN,TIS/BN: HCPCS | Performed by: ORTHOPAEDIC SURGERY

## 2018-07-18 PROCEDURE — 73502 X-RAY EXAM HIP UNI 2-3 VIEWS: CPT

## 2018-07-18 PROCEDURE — 27245 TREAT THIGH FRACTURE: CPT | Performed by: ORTHOPAEDIC SURGERY

## 2018-07-18 PROCEDURE — 0QH606Z INSERTION OF INTRAMEDULLARY INTERNAL FIXATION DEVICE INTO RIGHT UPPER FEMUR, OPEN APPROACH: ICD-10-PCS | Performed by: ORTHOPAEDIC SURGERY

## 2018-07-18 PROCEDURE — 85730 THROMBOPLASTIN TIME PARTIAL: CPT | Performed by: ORTHOPAEDIC SURGERY

## 2018-07-18 PROCEDURE — 83036 HEMOGLOBIN GLYCOSYLATED A1C: CPT | Performed by: INTERNAL MEDICINE

## 2018-07-18 PROCEDURE — 85610 PROTHROMBIN TIME: CPT | Performed by: ORTHOPAEDIC SURGERY

## 2018-07-18 PROCEDURE — 82948 REAGENT STRIP/BLOOD GLUCOSE: CPT

## 2018-07-18 PROCEDURE — 85025 COMPLETE CBC W/AUTO DIFF WBC: CPT | Performed by: ORTHOPAEDIC SURGERY

## 2018-07-18 PROCEDURE — 80048 BASIC METABOLIC PNL TOTAL CA: CPT | Performed by: ORTHOPAEDIC SURGERY

## 2018-07-18 DEVICE — 10MM/130 DEG TI CANN TROCH FIXATION NAIL 170MM-STERILE: Type: IMPLANTABLE DEVICE | Site: FEMUR | Status: FUNCTIONAL

## 2018-07-18 DEVICE — 11.0MM TI TROCH FIXATION NAIL SCREW/90MM - STERILE: Type: IMPLANTABLE DEVICE | Site: FEMUR | Status: FUNCTIONAL

## 2018-07-18 DEVICE — 5.0MM TI LOCKING SCREW W/T25 STARDRIVE 32MM F/IM NAIL-STER: Type: IMPLANTABLE DEVICE | Site: FEMUR | Status: FUNCTIONAL

## 2018-07-18 RX ORDER — INSULIN GLARGINE 100 [IU]/ML
26 INJECTION, SOLUTION SUBCUTANEOUS
Status: DISCONTINUED | OUTPATIENT
Start: 2018-07-19 | End: 2018-07-18

## 2018-07-18 RX ORDER — DOCUSATE SODIUM 100 MG/1
100 CAPSULE, LIQUID FILLED ORAL 2 TIMES DAILY
Qty: 10 CAPSULE | Refills: 0 | Status: SHIPPED | OUTPATIENT
Start: 2018-07-18 | End: 2019-07-02 | Stop reason: SDUPTHER

## 2018-07-18 RX ORDER — LABETALOL HYDROCHLORIDE 5 MG/ML
10 INJECTION, SOLUTION INTRAVENOUS AS NEEDED
Status: DISCONTINUED | OUTPATIENT
Start: 2018-07-18 | End: 2018-07-18

## 2018-07-18 RX ORDER — ONDANSETRON 2 MG/ML
INJECTION INTRAMUSCULAR; INTRAVENOUS AS NEEDED
Status: DISCONTINUED | OUTPATIENT
Start: 2018-07-18 | End: 2018-07-18 | Stop reason: SURG

## 2018-07-18 RX ORDER — INSULIN GLARGINE 100 [IU]/ML
26 INJECTION, SOLUTION SUBCUTANEOUS
Status: DISCONTINUED | OUTPATIENT
Start: 2018-07-18 | End: 2018-07-20 | Stop reason: HOSPADM

## 2018-07-18 RX ORDER — OXYCODONE HYDROCHLORIDE 5 MG/1
TABLET ORAL
Qty: 30 TABLET | Refills: 0 | Status: ON HOLD | OUTPATIENT
Start: 2018-07-18 | End: 2018-07-20 | Stop reason: ALTCHOICE

## 2018-07-18 RX ORDER — FENTANYL CITRATE 50 UG/ML
INJECTION, SOLUTION INTRAMUSCULAR; INTRAVENOUS AS NEEDED
Status: DISCONTINUED | OUTPATIENT
Start: 2018-07-18 | End: 2018-07-18 | Stop reason: SURG

## 2018-07-18 RX ORDER — MEPERIDINE HYDROCHLORIDE 25 MG/ML
25 INJECTION INTRAMUSCULAR; INTRAVENOUS; SUBCUTANEOUS AS NEEDED
Status: DISCONTINUED | OUTPATIENT
Start: 2018-07-18 | End: 2018-07-18 | Stop reason: HOSPADM

## 2018-07-18 RX ORDER — LABETALOL HYDROCHLORIDE 5 MG/ML
10 INJECTION, SOLUTION INTRAVENOUS
Status: DISCONTINUED | OUTPATIENT
Start: 2018-07-18 | End: 2018-07-18 | Stop reason: HOSPADM

## 2018-07-18 RX ORDER — LIDOCAINE HYDROCHLORIDE 10 MG/ML
INJECTION, SOLUTION INFILTRATION; PERINEURAL AS NEEDED
Status: DISCONTINUED | OUTPATIENT
Start: 2018-07-18 | End: 2018-07-18 | Stop reason: SURG

## 2018-07-18 RX ORDER — PROPOFOL 10 MG/ML
INJECTION, EMULSION INTRAVENOUS AS NEEDED
Status: DISCONTINUED | OUTPATIENT
Start: 2018-07-18 | End: 2018-07-18 | Stop reason: SURG

## 2018-07-18 RX ORDER — MAGNESIUM HYDROXIDE 1200 MG/15ML
LIQUID ORAL AS NEEDED
Status: DISCONTINUED | OUTPATIENT
Start: 2018-07-18 | End: 2018-07-18 | Stop reason: HOSPADM

## 2018-07-18 RX ADMIN — LIDOCAINE 1 PATCH: 50 PATCH TOPICAL at 18:51

## 2018-07-18 RX ADMIN — METHYLPHENIDATE HYDROCHLORIDE 5 MG: 10 TABLET ORAL at 08:48

## 2018-07-18 RX ADMIN — ACETAMINOPHEN 975 MG: 325 TABLET ORAL at 21:27

## 2018-07-18 RX ADMIN — OXYCODONE HYDROCHLORIDE 5 MG: 5 TABLET ORAL at 06:29

## 2018-07-18 RX ADMIN — FENTANYL CITRATE 25 MCG: 50 INJECTION, SOLUTION INTRAMUSCULAR; INTRAVENOUS at 14:25

## 2018-07-18 RX ADMIN — INSULIN HUMAN 5 UNITS: 100 INJECTION, SOLUTION PARENTERAL at 15:39

## 2018-07-18 RX ADMIN — CEFAZOLIN SODIUM 2000 MG: 2 SOLUTION INTRAVENOUS at 13:43

## 2018-07-18 RX ADMIN — OXYCODONE HYDROCHLORIDE 5 MG: 5 TABLET ORAL at 11:22

## 2018-07-18 RX ADMIN — INSULIN LISPRO 2 UNITS: 100 INJECTION, SOLUTION INTRAVENOUS; SUBCUTANEOUS at 18:52

## 2018-07-18 RX ADMIN — OXYCODONE HYDROCHLORIDE 5 MG: 5 TABLET ORAL at 20:38

## 2018-07-18 RX ADMIN — CITALOPRAM HYDROBROMIDE 10 MG: 10 TABLET ORAL at 08:48

## 2018-07-18 RX ADMIN — VALSARTAN 80 MG: 40 TABLET, FILM COATED ORAL at 18:51

## 2018-07-18 RX ADMIN — PROPOFOL 90 MG: 10 INJECTION, EMULSION INTRAVENOUS at 13:42

## 2018-07-18 RX ADMIN — HYDROMORPHONE HYDROCHLORIDE 0.2 MG: 1 INJECTION, SOLUTION INTRAMUSCULAR; INTRAVENOUS; SUBCUTANEOUS at 16:06

## 2018-07-18 RX ADMIN — INSULIN GLARGINE 26 UNITS: 100 INJECTION, SOLUTION SUBCUTANEOUS at 22:46

## 2018-07-18 RX ADMIN — INSULIN LISPRO 1 UNITS: 100 INJECTION, SOLUTION INTRAVENOUS; SUBCUTANEOUS at 11:28

## 2018-07-18 RX ADMIN — METHOCARBAMOL 500 MG: 500 TABLET ORAL at 08:58

## 2018-07-18 RX ADMIN — GABAPENTIN 100 MG: 100 CAPSULE ORAL at 21:27

## 2018-07-18 RX ADMIN — HYDROMORPHONE HYDROCHLORIDE 0.2 MG: 1 INJECTION, SOLUTION INTRAMUSCULAR; INTRAVENOUS; SUBCUTANEOUS at 15:59

## 2018-07-18 RX ADMIN — LABETALOL 20 MG/4 ML (5 MG/ML) INTRAVENOUS SYRINGE 10 MG: at 15:36

## 2018-07-18 RX ADMIN — DOCUSATE SODIUM 100 MG: 100 CAPSULE, LIQUID FILLED ORAL at 18:51

## 2018-07-18 RX ADMIN — INSULIN LISPRO 2 UNITS: 100 INJECTION, SOLUTION INTRAVENOUS; SUBCUTANEOUS at 09:06

## 2018-07-18 RX ADMIN — SENNOSIDES AND DOCUSATE SODIUM 2 TABLET: 8.6; 5 TABLET ORAL at 18:51

## 2018-07-18 RX ADMIN — AMLODIPINE BESYLATE 10 MG: 10 TABLET ORAL at 08:48

## 2018-07-18 RX ADMIN — INSULIN LISPRO 10 UNITS: 100 INJECTION, SOLUTION INTRAVENOUS; SUBCUTANEOUS at 18:52

## 2018-07-18 RX ADMIN — DEXAMETHASONE SODIUM PHOSPHATE 5 MG: 10 INJECTION INTRAMUSCULAR; INTRAVENOUS at 13:47

## 2018-07-18 RX ADMIN — SODIUM CHLORIDE, SODIUM LACTATE, POTASSIUM CHLORIDE, AND CALCIUM CHLORIDE 75 ML/HR: .6; .31; .03; .02 INJECTION, SOLUTION INTRAVENOUS at 16:24

## 2018-07-18 RX ADMIN — LIDOCAINE HYDROCHLORIDE 50 MG: 10 INJECTION, SOLUTION INFILTRATION; PERINEURAL at 13:42

## 2018-07-18 RX ADMIN — SODIUM CHLORIDE, SODIUM LACTATE, POTASSIUM CHLORIDE, AND CALCIUM CHLORIDE 75 ML/HR: .6; .31; .03; .02 INJECTION, SOLUTION INTRAVENOUS at 11:24

## 2018-07-18 RX ADMIN — ONDANSETRON 4 MG: 2 INJECTION INTRAMUSCULAR; INTRAVENOUS at 13:47

## 2018-07-18 NOTE — PROGRESS NOTES
Patient was scheduled for Xray  Family requested pain medication prior to transport, there are not medication orders at this time  Unable to get in contact with ortho resident or PA  Family refusing Xray until patient can receive pain medications

## 2018-07-18 NOTE — CONSULTS
[address] Internal Medicine-SOD TEAM Milana Crowe 80 y o  female MRN: [id number]  Unit/Bed#: CW3 351-01 Encounter: [id number]      Reason for Admission / Principal Problem: Right femur fracture  Reason for Consult: Pre-op clearance, medical management    Impression:  Patient Active Problem List   Diagnosis    Abnormal gait    Abnormal glucose    Abnormal weight gain    Abnormal weight loss    Acromegalia [facility]    Alopecia    Alzheimers disease    Arthritis    Bilateral impacted cerumen    Depression    Fatigue    Hypercholesterolemia    Hypertension    Osteopenia    Sleep disorder    Type 2 diabetes mellitus ([facility] )    Vitamin D deficiency    Candidal UTI (urinary tract infection)    RAZIA (obstructive sleep apnea)    Closed fracture of right hip (HCC)       A/P:  Pre-operative risk stratification for right femur fracture IMN  -The patient's METS are <4, poor functionality on a day-to-day basis  -Per the RCRI, she is a Class II risk, which means she has a 0 9% risk of major cardiac event   -Therefore she is low risk for an intermediate risk surgery  -NPO at midnight  -Pain management per primary, please see notes below    Mild Alzheimer's Dementia  -The patient is at significant risk for post-op delirium  -Would minimize use of narcotics (especially IV) post-op for pain control  -Attempt more conservative measures first such as NSAIDs (would specify what her allergy is), tylenol, c/t lidocaine patch and Robaxin    DM II, insulin dependent  -Continue to hold home Metformin while inpatient  -The patient did not eat dinner but glucose is currently 277  Hold evening dose of Lantus  Will administer Humalog 10 units x1 dose now   -Hold AM dose of Lantus tomorrow as well  Resume Lantus BID tomorrow evening   Will place order for this   -Continue SSI with glucose checks TID with meals  -Check Hg A1c  -Continue Gabapentin for neuropathy    HTN  -Continue Diovan and Norvasc    Depression  -Continue Celexa  -Continue Ritalin    Hx Pituitary Tumor  -Continue Bromocriptine      HPI: Yony Hughes is a 80 y o  female with history of mild Alzheimer's dementia, RAZIA intermittently compliant with CPAP, DM II on insulin, HTN who presented to [facility] after sustaining a mechanical fall  She currently resides at [facility]  She went to reach for her walker after lunch and ended up falling  There was no loss of consciousness  She then began to have right hip and right shoulder pain  CT head was normal, XR right shoulder showed prior shoulder replacement with no acute osseous abnormality, CT C-spine showed no acute fracture, but XR right hip showed acute right intertrochanteric fracture  She was admitted to the Orthopedic service with plans for right IMN of femur fracture in AM  We were consulted for management of her chronic medical conditions and for pre-op risk stratification  The patient's son, Dr Stephanie Buchanan, is a Geriatric physician here at [place] [address]  He assisted with most of the history along with his sister  The patient has been seen by Dr Jovanni Paz in the past  She is generally fully oriented x3, but has problems with short-term memory and needs frequent re-directing as she does get delirious  She has no history of congestive heart failure, CKD, MI/CAD, stroke or TIA  She is on insulin- Lantus 26 units BID and Metformin at home  She ambulates with a walker and requires assistance with all ADLs including dressing, feeding, bathing, transportation, ambulation  She has not complained recently of any chest pain or shortness of breath  The patient also denies fevers, chills, HA, dizziness, abd pain, n/v/d or leg swelling  The patient was answering questions appropriately on interview   Her son mentioned she is being worked up for possible parathyroid problems/hypercalcemia without any intervention up to this point as her calcium has been in the mid 10's, but is 11 3 today on admission  PMH:  Past Medical History:   Diagnosis Date    Ankle fracture     Dementia     [facility]     Hypertension     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[facility] )     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HCC)         PSH:  Past Surgical History:   Procedure Laterality Date    CARPAL TUNNEL RELEASE         Allergies: Allergies   Allergen Reactions    Amoxicillin     Ibuprofen        Family History: History reviewed  No pertinent family history  Social History:   History   Smoking Status    Never Smoker   Smokeless Tobacco    Never Used    History   Alcohol Use No    History   Drug Use No        Home Medications:   Prior to Admission medications    Medication Sig Start Date End Date Taking?  Authorizing Provider   amLODIPine (NORVASC) 5 mg tablet Take 10 mg by mouth daily     Yes Historical Provider, MD   bromocriptine (PARLODEL) 2 5 mg tablet TAKE 1/4 TABLET BY MOUTH AT BEDTIME    Yes Historical Provider, MD   Cholecalciferol (VITAMIN D3) 38542 units TABS Take by mouth daily   Yes Historical Provider, MD   citalopram (CeleXA) 10 mg tablet TAKE 1/2 TABLET BY MOUTH DAILY    Yes Historical Provider, MD   cyanocobalamin (VITAMIN B-12) 1,000 mcg tablet Take by mouth daily   Yes Historical Provider, MD   gabapentin (NEURONTIN) 100 mg capsule Take 100 mg by mouth daily at bedtime   Yes Historical Provider, MD   LANTUS SOLOSTAR injection pen 100 units/mL 26 Units 2 (two) times a day INJECT 28 UNITS SUBCU  3/5/18  Yes Historical Provider, MD   lidocaine (LIDODERM) 5 % Place 1 patch on the skin daily Remove & Discard patch within 12 hours or as directed by MD   Yes Historical Provider, MD   metFORMIN (GLUCOPHAGE) 1000 MG tablet Take 1 tablet by mouth daily   Yes Historical Provider, MD   methylphenidate (RITALIN) 10 mg tablet TAKE 1/2 TAB DAILY IN EARLY AM  2/15/18  Yes Historical Provider, MD   valsartan (DIOVAN) 160 mg tablet Give one tablet daily 5/10/18  Yes Historical Provider, MD   acetaminophen (TYLENOL) 325 mg tablet 325 mg Take two tablet by mouth at 6:30am and 6:30pm    Historical Provider, MD   acetaminophen (TYLENOL) 500 mg tablet TAKE TWO TABLETS BY MOUTH AT 6:30AM AND 6:30PM  8/15/17   Historical Provider, MD Reyes Dickinson 30G X 8 MM [address] [facility]  3/23/18   Historical Provider, MD       ROS:   Review of Systems   Constitutional: Negative for activity change, chills, diaphoresis, fatigue and fever  Respiratory: Negative for shortness of breath and wheezing  Cardiovascular: Negative for chest pain, palpitations and leg swelling  Gastrointestinal: Negative for abdominal pain, diarrhea, nausea and vomiting  Musculoskeletal: Positive for arthralgias  Neurological: Negative for dizziness and weakness  Vitals:   Vitals:    07/17/18 1755 07/17/18 1830 07/17/18 2031 07/17/18 2340   BP: 147/67 156/69 (!) 178/67    BP Location: Right arm  Right arm    Pulse: 81 82 85 88   Resp: 18 17 18 18   Temp:   98 3 °F (36 8 °C) 98 8 °F (37 1 °C)   TempSrc:   Oral Oral   SpO2: 94% 96% 97% 96%     Temp  Min: 98 3 °F (36 8 °C)  Max: 98 8 °F (37 1 °C)     There is no height or weight on file to calculate BMI  Physical Exam   GEN: AAOx3, lethargic  HEENT: MM moist  No scleral icterus  CV: RRR, nml S1/S2, no murmur appreciated  Lungs: CTA bilaterally, no w/r/r  Abd: Soft, NT, ND  +NABS     Neuro: Mild cognitive impairment  MSK: Right hip ROM limited 2/2 pain      Labs:     Results from last 7 days  Lab Units 07/17/18  1435   WBC Thousand/uL 7 75   HEMOGLOBIN g/dL 11 6   HEMATOCRIT % 35 9   PLATELETS Thousands/uL 164   NEUTROS PCT % 65   MONOS PCT % 6       Results from last 7 days  Lab Units 07/17/18  1435   SODIUM mmol/L 134*   POTASSIUM mmol/L 4 3   CHLORIDE mmol/L 104   CO2 mmol/L 24   BUN mg/dL 18   CREATININE mg/dL 0 82   CALCIUM mg/dL 11 3*   TOTAL PROTEIN g/dL 7 4   BILIRUBIN TOTAL mg/dL 0 41   ALK PHOS U/L 93   ALT U/L 30   AST U/L 28   GLUCOSE RANDOM mg/dL 250*         Lab Results   Component Value Date    PHOS 2 8 05/12/2017    PHOS 2 7 11/14/2016    PHOS 3 1 01/12/2016        Results from last 7 days  Lab Units 07/17/18  1435   INR  1 06   PTT seconds 37*       Imaging:I have personally reviewed pertinent reports  XR Chest 1 View Portable  Result Date: 7/17/2018  Impression: Cardiomegaly with central fullness of the pulmonary vasculature, may represent chronic congestive changes  No acute findings  Xr Shoulder 2+ Views Right  Result Date: 7/17/2018  Impression: Prior right shoulder replacement  No acute osseous abnormality  Xr Hip/pelv 2-3 Vws Right If Performed  Result Date: 7/17/2018  Impression: Intertrochanteric fracture of the right hip  Ct Head Without Contrast  Result Date: 7/17/2018  Impression: No acute intracranial abnormality  Microangiopathic changes  Ct Spine Cervical Without Contrast  Result Date: 7/17/2018  Impression: 1  No cervical spine fracture or traumatic malalignment  2   Mosaic attenuation at the lung apices           VTE Pharmacologic Prophylaxis: Reason for no pharmacologic prophylaxis  Surgery tomorrow  VTE Mechanical Prophylaxis: sequential compression device      Cayden Medeiros DO  7/18/2018 12:07 AM

## 2018-07-18 NOTE — SOCIAL WORK
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  Hx of IP rehab at [facility]  Pt requires assist with bathing, dressing, food set up and medication administration  She is able to stand, pivot with assist x1 and cueing  She uses a RW unless longer distances when she requires a wheelchair  She is on Cpap at night  Hx of left ankle and R shoulder fractures  Dtr Odilia Tucker requesting IP rehab at [address] / [address] in [facility] with alternate choice of [facility] [employer]  Dtr is aware that referrals are pending post-op PT/OT tru  Dtr reported that the pt's Son Idalia Lara is the Medical Director at [facility] and aure amanda is a PT at [address]  Main contact: POA /  Dtr Chase Cramer [phone]  OTV Orin Carvajal [phone]    Admit Dx Fall w R femur fx  OR today for R Femur nailing  Hx HTN, DM  CM reviewed d/c planning process including the following: identifying help at home, patient preference for d/c planning needs, Discharge Lounge, [facility] Meds to Bed program, availability of treatment team to discuss questions or concerns patient and/or family may have regarding understanding medications and recognizing signs and symptoms once discharged  CM also encouraged patient to follow up with all recommended appointments after discharge  Patient advised of importance for patient and family to participate in managing patients medical well being

## 2018-07-18 NOTE — CASE MANAGEMENT
Initial Clinical Review     ADMIT  OBS  7/17  @  1694    CHANGED to INPT on  7/18 @  1455      ED: Date/Time/Mode of Arrival:   ED Arrival Information     Expected Arrival Acuity Means of Arrival Escorted By Service Admission Type    - 7/17/2018 14:11 [facility]gent [facility] Orthopedic Surgery Emergency    Arrival Complaint    fall          Chief Complaint:   Chief Complaint   Patient presents with    Hip Injury     pt resided in dementia unit at [facility], pt stood and lost footing and grabbed tablecloth  pt was a witnessed fall onto R hip  R hip is noted to be internally rotated  pts son stated pt was found face first on floor  per pts son, who is a physician, pt is Bem Rkp  97  ED Vital Signs:   ED Triage Vitals   Temperature Pulse Respirations Blood Pressure SpO2   07/17/18 2031 07/17/18 1445 07/17/18 1445 07/17/18 1445 07/17/18 1445   98 3 °F (36 8 °C) 74 15 153/65 93 %      Temp Source Heart Rate Source Patient Position - Orthostatic VS BP Location FiO2 (%)   07/17/18 2031 07/17/18 1755 07/17/18 1755 07/17/18 1755 --   Oral Monitor Lying Right arm       Pain Score       07/17/18 2052       Worst Possible Pain        Wt Readings from Last 1 Encounters:   07/18/18 68 kg (150 lb)     Abnormal Labs/Diagnostic Test Results:   X-rays right hip shows Intertrochanteric femur fracture   na  134      ED Treatment:   Medication Administration from 07/17/2018 1411 to 07/17/2018 1911       Date/Time Order Dose Route Action Action by Comments                07/17/2018 1518 fentanyl citrate (PF) 100 MCG/2ML 25 mcg 25 mcg Intravenous Given Birdie Parry RN           Past Medical/Surgical History:    Active Ambulatory Problems     Diagnosis Date Noted    Abnormal gait 09/12/2017    Abnormal weight gain 11/26/2013    Abnormal weight loss 11/26/2013    Acromegalia ([address] ) 08/15/2017    Alopecia 11/26/2013    Alzheimers disease 12/04/2013    Arthritis 08/15/2017    Bilateral impacted cerumen 08/15/2017    Depression 12/04/2013    Fatigue 11/26/2013    Hypercholesterolemia 11/26/2013    Hypertension 11/26/2013    Osteopenia 11/26/2013    Sleep disorder 11/26/2013    Type 2 diabetes mellitus ([place] [address] ) 08/15/2017    Vitamin D deficiency 08/15/2017    Candidal UTI (urinary tract infection) 05/08/2018    RAZIA (obstructive sleep apnea)      Resolved Ambulatory Problems     Diagnosis Date Noted    Abnormal glucose 11/26/2013    Diarrhea due to drug 11/21/2017     Past Medical History:   Diagnosis Date    Ankle fracture     Dementia     Diabetes ([place] [address] )     Hypertension     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[place] [address] )     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HCC)        Admitting Diagnosis: Hyperglycemia [R73 9]  Injury, unspecified, initial encounter [T14 90XA]  Closed fracture of right hip, initial encounter ([place] [address] ) [S72 001A]  Unspecified multiple injuries, initial encounter [T07  XXXA]    Assessment:  80 y  o female status post fall with rightIntertrochanteric femur fracture  Plan:   · Non weight bearing right lower extremity  · Analgesics for pain  · NPO at midnight  · Gómez Catheter insertion  · Medicine consult for all medical management and preoperative risk stratification  · To OR for IM nail femur fracture of Intertrochanteric femur fracture    Admission Orders:  NPO  IVF @ 75  I/O q shift  accucks w/sliding scale insulin  sequential compression device    Scheduled Meds:   Current Facility-Administered Medications:  acetaminophen 975 mg Oral Q8H Debra Sears MD    amLODIPine 10 mg Oral Daily Jeramy Dixon MD    calcium carbonate 1,000 mg Oral Daily PRN Jeramy Dixon MD    citalopram 10 mg Oral Daily Jeramy Dixon MD    docusate sodium 100 mg Oral BID Lion Gauthier MD    gabapentin 100 mg Oral HS Jeramy Dixon MD    [START ON 7/19/2018] insulin glargine 26 Units Subcutaneous HS Ana Cristina Jane DO    insulin lispro 1-5 Units Subcutaneous TID AC Jeramy Dixon MD    insulin lispro 10 Units Subcutaneous TID With Meals Milagro Saleh DO    lactated ringers 75 mL/hr Intravenous Continuous Berenice Blount MD Last Rate: 75 mL/hr (07/17/18 1666)   lidocaine 1 patch Transdermal Daily Mary Barr MD    methocarbamol 500 mg Oral Q6H PRN Mary Barr MD    methylphenidate 5 mg Oral Daily Mary Barr MD    oxyCODONE 2 5 mg Oral Q4H PRN Mary Barr MD    oxyCODONE 5 mg Oral Q4H PRN Mary Barr MD    senna-docusate sodium 2 tablet Oral Daily Berenice Blount MD    valsartan 80 mg Oral BID Mary Barr MD          INTERNAL MEDICINE  Pre-operative risk stratification for right femur fracture IMN  -The patient's METS are <4, poor functionality on a day-to-day basis   Mild Alzheimer's Dementia  -The patient is at significant risk for post-op delirium  DM II, insulin dependent  -Continue to hold home Metformin while inpatient  -Continue SSI with glucose checks TID with meals  -Check Hg A1c  -Continue Gabapentin for neuropathy       7/18/2018  OR TODAY

## 2018-07-18 NOTE — PHYSICAL THERAPY NOTE
PT CANCELLATION NOTE    Orders received  Chart review completed  Pt pending OR for IM nail s/p femur fx  PT will follow up  Please re-consult PT post-op w/ updated WBS and activity orders        Chantale Moreno, PT

## 2018-07-18 NOTE — ANESTHESIA POSTPROCEDURE EVALUATION
Post-Op Assessment Note      CV Status:  Stable    Mental Status:  Alert and awake    Hydration Status:  Euvolemic    PONV Controlled:  Controlled    Airway Patency:  Patent    Post Op Vitals Reviewed: Yes          Staff: Anesthesiologist, CRNA           BP   199/69   Temp   99 6   Pulse  86   Resp   16   SpO2   95%

## 2018-07-18 NOTE — H&P
Orthopedics   Monica Cage 80 y o  female MRN: [id number]  Unit/Bed#: CW3 351-01      Chief Complaint:   right hip pain    HPI:   80 y  o female with history of dementia and ambulates with walker status post fall this afternoon complaining of right hip pain and inability to bear weight  She was standing up after eating lunch and reaching for her walker and falling  She has never hurt this hip before, and she also complains of right shoulder pain  She currently resides at [facility]  Review Of Systems:   · Skin: Normal  · Neuro: See HPI  · Musculoskeletal: See HPI  · 14 point review of systems negative except as stated above     Past Medical History:   Past Medical History:   Diagnosis Date    Ankle fracture     Dementia     [facility]     Hypertension     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[facility] )     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[facility] )        Past Surgical History:   Past Surgical History:   Procedure Laterality Date    CARPAL TUNNEL RELEASE         Family History:  Family history reviewed and non-contributory  History reviewed  No pertinent family history  Social History:  Social History     Social History    Marital status:      Spouse name: N/A    Number of children: N/A    Years of education: N/A     Social History Main Topics    Smoking status: Never Smoker    Smokeless tobacco: Never Used    Alcohol use No    Drug use: No    Sexual activity: Not Asked     Other Topics Concern    None     Social History Narrative    None       Allergies:    Allergies   Allergen Reactions    Amoxicillin     Ibuprofen            Labs:    0  Lab Value Date/Time   HCT 35 9 07/17/2018 1435   HCT 41 3 05/12/2017 0924   HCT 41 9 11/14/2016 0825   HCT 43 3 08/17/2015 0931   HCT 42 0 02/27/2015 0931   HCT 40 3 10/16/2014 0954   HGB 11 6 07/17/2018 1435   HGB 13 6 05/12/2017 0924   HGB 13 9 11/14/2016 0825   HGB 14 6 08/17/2015 0931   HGB 13 6 02/27/2015 0931   HGB 12 9 10/16/2014 0954   INR 1 06 07/17/2018 1435   WBC 7 75 07/17/2018 1435   WBC 7 12 05/12/2017 0924   WBC 6 33 11/14/2016 0825   WBC 6 84 08/17/2015 0931   WBC 5 79 02/27/2015 0931   WBC 5 25 10/16/2014 0954       Meds:  No current facility-administered medications for this encounter  Blood Culture:   No results found for: BLOODCX    Wound Culture:   No results found for: WOUNDCULT    Ins and Outs:  No intake/output data recorded  Physical Exam:   /69   Pulse 82   Resp 17   SpO2 96%   Gen: Alert and lying in bed in pain  HEENT: EOMI, eyes clear, moist mucus membranes, hearing intact  Respiratory: Bilateral chest rise  No audible wheezing found  Cardiovascular: No audible murmurs  Abdomen: soft nontender/nondistended  Musculoskeletal: right lower extremity  · Skin intact and RLE held in internal rotation  · Tender to palpation over hip  · Positive log roll  · Sensation intact L2-S1  · Positive ankle dorsi/plantar flexion, EHL/FHL  · 2+ DP pulse    Radiology:   I personally reviewed the films  X-rays right hip shows Intertrochanteric femur fracture    Assessment:  80 y  o female status post fall with rightIntertrochanteric femur fracture    Plan:   · Non weight bearing right lower extremity  · Analgesics for pain  · NPO at midnight  · Gómez Catheter insertion  · Medicine consult for all medical management and preoperative risk stratification  · To OR for IM nail femur fracture of Intertrochanteric femur fracture    Stephanie Valles MD

## 2018-07-18 NOTE — PLAN OF CARE
Problem: Potential for Falls  Goal: Patient will remain free of falls  INTERVENTIONS:  - Assess patient frequently for physical needs  -  Identify cognitive and physical deficits and behaviors that affect risk of falls    -  Mobile fall precautions as indicated by assessment   - Educate patient/family on patient safety including physical limitations  - Instruct patient to call for assistance with activity based on assessment  - Modify environment to reduce risk of injury  - Consider OT/PT consult to assist with strengthening/mobility    Outcome: Progressing      Problem: Prexisting or High Potential for Compromised Skin Integrity  Goal: Skin integrity is maintained or improved  INTERVENTIONS:  - Identify patients at risk for skin breakdown  - Assess and monitor skin integrity  - Assess and monitor nutrition and hydration status  - Monitor labs (i e  albumin)  - Assess for incontinence   - Turn and reposition patient  - Assist with mobility/ambulation  - Relieve pressure over bony prominences  - Avoid friction and shearing  - Provide appropriate hygiene as needed including keeping skin clean and dry  - Evaluate need for skin moisturizer/barrier cream  - Collaborate with interdisciplinary team (i e  Nutrition, Rehabilitation, etc )   - Patient/family teaching   Outcome: Progressing      Problem: PAIN - ADULT  Goal: Verbalizes/displays adequate comfort level or baseline comfort level  Interventions:  - Encourage patient to monitor pain and request assistance  - Assess pain using appropriate pain scale  - Administer analgesics based on type and severity of pain and evaluate response  - Implement non-pharmacological measures as appropriate and evaluate response  - Consider cultural and social influences on pain and pain management  - Notify physician/advanced practitioner if interventions unsuccessful or patient reports new pain   Outcome: Progressing      Problem: INFECTION - ADULT  Goal: Absence or prevention of progression during hospitalization  INTERVENTIONS:  - Assess and monitor for signs and symptoms of infection  - Monitor lab/diagnostic results  - Monitor all insertion sites, i e  indwelling lines, tubes, and drains  - Monitor endotracheal (as able) and nasal secretions for changes in amount and color  - [place] appropriate cooling/warming therapies per order  - Administer medications as ordered  - Instruct and encourage patient and family to use good hand hygiene technique  - Identify and instruct in appropriate isolation precautions for identified infection/condition   Outcome: Progressing    Goal: Absence of fever/infection during neutropenic period  INTERVENTIONS:  - Monitor WBC  - Implement neutropenic guidelines   Outcome: Progressing      Problem: SAFETY ADULT  Goal: Maintain or return to baseline ADL function  INTERVENTIONS:  -  Assess patient's ability to carry out ADLs; assess patient's baseline for ADL function and identify physical deficits which impact ability to perform ADLs (bathing, care of mouth/teeth, toileting, grooming, dressing, etc )  - Assess/evaluate cause of self-care deficits   - Assess range of motion  - Assess patient's mobility; develop plan if impaired  - Assess patient's need for assistive devices and provide as appropriate  - Encourage maximum independence but intervene and supervise when necessary  ¯ Involve family in performance of ADLs  ¯ Assess for home care needs following discharge   ¯ Request OT consult to assist with ADL evaluation and planning for discharge  ¯ Provide patient education as appropriate   Outcome: Progressing    Goal: Maintain or return mobility status to optimal level  INTERVENTIONS:  - Assess patient's baseline mobility status (ambulation, transfers, stairs, etc )    - Identify cognitive and physical deficits and behaviors that affect mobility  - Identify mobility aids required to assist with transfers and/or ambulation (gait belt, sit-to-stand, lift, walker, cane, etc )  - [place] fall precautions as indicated by assessment  - Record patient progress and toleration of activity level on Mobility SBAR; progress patient to next Phase/Stage  - Instruct patient to call for assistance with activity based on assessment  - Request Rehabilitation consult to assist with strengthening/weightbearing, etc    Outcome: Progressing    Goal: Patient will remain free of falls  INTERVENTIONS:  - Assess patient frequently for physical needs  -  Identify cognitive and physical deficits and behaviors that affect risk of falls  -  [place] fall precautions as indicated by assessment   - Educate patient/family on patient safety including physical limitations  - Instruct patient to call for assistance with activity based on assessment  - Modify environment to reduce risk of injury  - Consider OT/PT consult to assist with strengthening/mobility    Outcome: Progressing      Problem: DISCHARGE PLANNING  Goal: Discharge to home or other facility with appropriate resources  INTERVENTIONS:  - Identify barriers to discharge w/patient and caregiver  - Arrange for needed discharge resources and transportation as appropriate  - Identify discharge learning needs (meds, wound care, etc )  - Arrange for interpretive services to assist at discharge as needed  - Refer to Case Management Department for coordinating discharge planning if the patient needs post-hospital services based on physician/advanced practitioner order or complex needs related to functional status, cognitive ability, or social support system   Outcome: Progressing      Problem: Knowledge Deficit  Goal: Patient/family/caregiver demonstrates understanding of disease process, treatment plan, medications, and discharge instructions  Complete learning assessment and assess knowledge base    Interventions:  - Provide teaching at level of understanding  - Provide teaching via preferred learning methods   Outcome: Progressing

## 2018-07-18 NOTE — OP NOTE
OPERATIVE REPORT  PATIENT NAME: Daryle Dikes    :  1930  MRN: [id number]  Pt Location:  OR ROOM 18    SURGERY DATE: 2018    Surgeon(s) and Role:     * Thelma Lucas MD - Primary     * Konrad Pete - Assisting     * Tamanna Stewart PA-C - Assisting    Preop Diagnosis:  Closed fracture of right hip, initial encounter ([place] [address] ) Jonelel Jamel    Post-Op Diagnosis Codes:     * Closed fracture of right hip, initial encounter ([place] [address] ) [S72 001A]    Procedure(s) (LRB):  INSERTION NAIL IM FEMUR ANTEGRADE (TROCHANTERIC) (Right)    Specimen(s):  * No specimens in log *    Estimated Blood Loss:   200 mL    Drains:       Anesthesia Type:   General    Operative Indications:  Closed fracture of right hip, initial encounter ([place] [address] ) [S72 001A]  Intertrochanteric fracture right hip    Operative Findings:  Displaced intertrochanteric fracture right hip    Complications:   None    Procedure and Technique:  Open reduction internal fixation of right intertrochanteric fracture with Synthes TFN nail  Patient suffered a fractured right hip in a fall from standing  She was cleared by the medicine team for the operating room and was subsequently brought the operating room for an open reduction internal fixation  She was administered a general anesthetic with trach innovation and transferred to the fracture table  She was then prepped and draped in usual sterile fashion for an operation on the right hip  An incision was made proximal to the greater trochanter  Sharp dissection was carried down through subcutaneous tissue to the fascia  The fascia was incised the length of the wound  Guidewire was inserted into the region of the apex of the greater trochanter center center on the AP and lateral views  It was driven to the level below the low the lesser trochanter  The 17 mm cannulated drill was then applied over this guidewire to open the canal  The Synthes 12 mm 130 degree angle titanium nail was selected    This was inserted into the opening in the proximal femur  The depth of insertion was guided by positioning on the fluoroscope  An incision was made in appropriate position over the lateral thigh so that the cannulas would be advanced to the lateral cortex of the femur  Once this was complete the drill guide was then used to drill a guide pin across the lateral aspect of the femur through the seth and into the femoral head this was refined in 1 step and was found to be center center on the AP and lateral view  The lateral cortex was then drilled  The step drill was set to 95 and drilling ensued  The appropriate length of the lag screw was measured at 105  The tap was then used for the length of the guidewire  The lag screw was inserted in the usual fashion locked into place  Under the guide of image intensification compression was carried out at the fracture site  Once this was complete the distal jig was applied to the handle and the distal locking screw was deployed  The skin was incised in appropriate place trocars were advanced to the lateral cortex of the bone drilling ensued across the lateral cortex through the distal locking hole and across the medial cortex appropriate length screw was inserted the handle was removed the wounds were thoroughly irrigated with sterile saline solution the fascia was closed using 0 Vicryl in a figure-of-eight fashion subcutaneous tissue was closed using 2 0 Vicryl inverted fashion the skin was closed using staples sterile dressings were applied the patient tolerated the procedure well of the operating good condition     I was present for the entire procedure    Patient Disposition:  PACU     SIGNATURE: Olu Zamudio MD  DATE: July 18, 2018  TIME: 4:46 PM

## 2018-07-18 NOTE — ANESTHESIA PREPROCEDURE EVALUATION
Review of Systems/Medical History  Patient summary reviewed  Chart reviewed  No history of anesthetic complications     Cardiovascular  EKG reviewed, Hyperlipidemia, Hypertension controlled,    Pulmonary  Sleep apnea CPAP,        GI/Hepatic       No chronic kidney disease ,        Endo/Other  Diabetes poorly controlled type 2 Insulin,   Obesity    GYN       Hematology   Musculoskeletal    Arthritis     Neurology   Psychology   Depression , being treated for depression,   Dementia           Physical Exam    Airway    Mallampati score: III  TM Distance: >3 FB  Neck ROM: full     Dental   upper dentures and lower dentures,     Cardiovascular      Pulmonary      Other Findings        Anesthesia Plan  ASA Score- 3     Anesthesia Type- general with ASA Monitors  Additional Monitors:   Airway Plan: ETT  Plan Factors-  Patient did not smoke on day of surgery  Induction- intravenous  Postoperative Plan- Plan for postoperative opioid use  Informed Consent- Anesthetic plan and risks discussed with healthcare power of  and daughter  I personally reviewed this patient with the CRNA  Discussed and agreed on the Anesthesia Plan with the CRNA  Leela Leach

## 2018-07-18 NOTE — OCCUPATIONAL THERAPY NOTE
Occupational Therapy Screen    Orders received  Chart review completed  Pt pending OR for IM nail s/p femur fx  Will sign off at this time  Please re-consult OT post-op w/ updated WBS and activity orders  Thank you! D/C OT          Ana Fam MS, OTR/L

## 2018-07-19 LAB
ANION GAP SERPL CALCULATED.3IONS-SCNC: 6 MMOL/L (ref 4–13)
BASOPHILS # BLD AUTO: 0.02 THOUSANDS/ΜL (ref 0–0.1)
BASOPHILS NFR BLD AUTO: 0 % (ref 0–1)
BUN SERPL-MCNC: 19 MG/DL (ref 5–25)
CALCIUM SERPL-MCNC: 10.1 MG/DL (ref 8.3–10.1)
CHLORIDE SERPL-SCNC: 105 MMOL/L (ref 100–108)
CO2 SERPL-SCNC: 24 MMOL/L (ref 21–32)
CREAT SERPL-MCNC: 0.7 MG/DL (ref 0.6–1.3)
EOSINOPHIL # BLD AUTO: 0.01 THOUSAND/ΜL (ref 0–0.61)
EOSINOPHIL NFR BLD AUTO: 0 % (ref 0–6)
ERYTHROCYTE [DISTWIDTH] IN BLOOD BY AUTOMATED COUNT: 14.6 % (ref 11.6–15.1)
GFR SERPL CREATININE-BSD FRML MDRD: 78 ML/MIN/1.73SQ M
GLUCOSE SERPL-MCNC: 142 MG/DL (ref 65–140)
GLUCOSE SERPL-MCNC: 148 MG/DL (ref 65–140)
GLUCOSE SERPL-MCNC: 207 MG/DL (ref 65–140)
GLUCOSE SERPL-MCNC: 208 MG/DL (ref 65–140)
GLUCOSE SERPL-MCNC: 225 MG/DL (ref 65–140)
HCT VFR BLD AUTO: 29.4 % (ref 34.8–46.1)
HGB BLD-MCNC: 9.4 G/DL (ref 11.5–15.4)
IMM GRANULOCYTES # BLD AUTO: 0.05 THOUSAND/UL (ref 0–0.2)
IMM GRANULOCYTES NFR BLD AUTO: 1 % (ref 0–2)
LYMPHOCYTES # BLD AUTO: 1.39 THOUSANDS/ΜL (ref 0.6–4.47)
LYMPHOCYTES NFR BLD AUTO: 16 % (ref 14–44)
MCH RBC QN AUTO: 28.7 PG (ref 26.8–34.3)
MCHC RBC AUTO-ENTMCNC: 32 G/DL (ref 31.4–37.4)
MCV RBC AUTO: 90 FL (ref 82–98)
MONOCYTES # BLD AUTO: 0.86 THOUSAND/ΜL (ref 0.17–1.22)
MONOCYTES NFR BLD AUTO: 10 % (ref 4–12)
MRSA NOSE QL CULT: NORMAL
NEUTROPHILS # BLD AUTO: 6.23 THOUSANDS/ΜL (ref 1.85–7.62)
NEUTS SEG NFR BLD AUTO: 73 % (ref 43–75)
NRBC BLD AUTO-RTO: 0 /100 WBCS
PLATELET # BLD AUTO: 151 THOUSANDS/UL (ref 149–390)
PMV BLD AUTO: 9.3 FL (ref 8.9–12.7)
POTASSIUM SERPL-SCNC: 4.5 MMOL/L (ref 3.5–5.3)
RBC # BLD AUTO: 3.28 MILLION/UL (ref 3.81–5.12)
SODIUM SERPL-SCNC: 135 MMOL/L (ref 136–145)
WBC # BLD AUTO: 8.56 THOUSAND/UL (ref 4.31–10.16)

## 2018-07-19 PROCEDURE — G8988 SELF CARE GOAL STATUS: HCPCS

## 2018-07-19 PROCEDURE — G8987 SELF CARE CURRENT STATUS: HCPCS

## 2018-07-19 PROCEDURE — G8978 MOBILITY CURRENT STATUS: HCPCS

## 2018-07-19 PROCEDURE — G8979 MOBILITY GOAL STATUS: HCPCS

## 2018-07-19 PROCEDURE — 97163 PT EVAL HIGH COMPLEX 45 MIN: CPT

## 2018-07-19 PROCEDURE — 80048 BASIC METABOLIC PNL TOTAL CA: CPT | Performed by: INTERNAL MEDICINE

## 2018-07-19 PROCEDURE — 85025 COMPLETE CBC W/AUTO DIFF WBC: CPT | Performed by: ORTHOPAEDIC SURGERY

## 2018-07-19 PROCEDURE — 82948 REAGENT STRIP/BLOOD GLUCOSE: CPT

## 2018-07-19 PROCEDURE — 97167 OT EVAL HIGH COMPLEX 60 MIN: CPT

## 2018-07-19 RX ORDER — ACETAMINOPHEN 325 MG/1
TABLET ORAL
Qty: 30 TABLET | Refills: 0 | Status: ON HOLD
Start: 2018-07-19 | End: 2018-07-20 | Stop reason: ALTCHOICE

## 2018-07-19 RX ORDER — AMOXICILLIN 250 MG
2 CAPSULE ORAL DAILY
Refills: 0
Start: 2018-07-20 | End: 2018-10-07 | Stop reason: SDUPTHER

## 2018-07-19 RX ADMIN — GABAPENTIN 100 MG: 100 CAPSULE ORAL at 21:44

## 2018-07-19 RX ADMIN — INSULIN LISPRO 10 UNITS: 100 INJECTION, SOLUTION INTRAVENOUS; SUBCUTANEOUS at 08:20

## 2018-07-19 RX ADMIN — DOCUSATE SODIUM 100 MG: 100 CAPSULE, LIQUID FILLED ORAL at 08:17

## 2018-07-19 RX ADMIN — CITALOPRAM HYDROBROMIDE 10 MG: 10 TABLET ORAL at 08:18

## 2018-07-19 RX ADMIN — ACETAMINOPHEN 975 MG: 325 TABLET ORAL at 13:36

## 2018-07-19 RX ADMIN — ACETAMINOPHEN 975 MG: 325 TABLET ORAL at 05:24

## 2018-07-19 RX ADMIN — AMLODIPINE BESYLATE 10 MG: 10 TABLET ORAL at 08:18

## 2018-07-19 RX ADMIN — SODIUM CHLORIDE, SODIUM LACTATE, POTASSIUM CHLORIDE, AND CALCIUM CHLORIDE 75 ML/HR: .6; .31; .03; .02 INJECTION, SOLUTION INTRAVENOUS at 05:24

## 2018-07-19 RX ADMIN — SENNOSIDES AND DOCUSATE SODIUM 2 TABLET: 8.6; 5 TABLET ORAL at 08:18

## 2018-07-19 RX ADMIN — VALSARTAN 80 MG: 40 TABLET, FILM COATED ORAL at 18:03

## 2018-07-19 RX ADMIN — INSULIN LISPRO 1 UNITS: 100 INJECTION, SOLUTION INTRAVENOUS; SUBCUTANEOUS at 21:39

## 2018-07-19 RX ADMIN — INSULIN GLARGINE 26 UNITS: 100 INJECTION, SOLUTION SUBCUTANEOUS at 21:45

## 2018-07-19 RX ADMIN — INSULIN LISPRO 10 UNITS: 100 INJECTION, SOLUTION INTRAVENOUS; SUBCUTANEOUS at 18:05

## 2018-07-19 RX ADMIN — ENOXAPARIN SODIUM 40 MG: 40 INJECTION SUBCUTANEOUS at 09:24

## 2018-07-19 RX ADMIN — OXYCODONE HYDROCHLORIDE 5 MG: 5 TABLET ORAL at 08:18

## 2018-07-19 RX ADMIN — VALSARTAN 80 MG: 40 TABLET, FILM COATED ORAL at 08:21

## 2018-07-19 RX ADMIN — DOCUSATE SODIUM 100 MG: 100 CAPSULE, LIQUID FILLED ORAL at 18:06

## 2018-07-19 RX ADMIN — ACETAMINOPHEN 975 MG: 325 TABLET ORAL at 21:44

## 2018-07-19 RX ADMIN — OXYCODONE HYDROCHLORIDE 5 MG: 5 TABLET ORAL at 20:05

## 2018-07-19 RX ADMIN — LIDOCAINE 1 PATCH: 50 PATCH TOPICAL at 18:08

## 2018-07-19 RX ADMIN — METHYLPHENIDATE HYDROCHLORIDE 5 MG: 10 TABLET ORAL at 08:18

## 2018-07-19 RX ADMIN — INSULIN LISPRO 1 UNITS: 100 INJECTION, SOLUTION INTRAVENOUS; SUBCUTANEOUS at 08:19

## 2018-07-19 RX ADMIN — INSULIN LISPRO 10 UNITS: 100 INJECTION, SOLUTION INTRAVENOUS; SUBCUTANEOUS at 12:31

## 2018-07-19 NOTE — PROGRESS NOTES
IM Residency Progress Note   Unit/Bed#: CW3 340-01 Encounter: [id number]  SOD Team B       Ester Ortiz 80 y o  female [id number]    Hospital Stay Days: 1      Assessment/Plan:    Principal Problem:    Closed fracture of right hip [facility])  Active Problems:    Alzheimers disease    Hypercholesterolemia    Hypertension    Type 2 diabetes mellitus ([facility] [address] )    Closed Fracture of right hip  · POD 1 right femoral intramedullary nail  Pt tolerated procedure well  · Management per ortho  · Pain management per ortho    Type 2 DM  · Last A1C 8 1%  Blood glucose trending in 200's  · Continue humalog 10 u TID with meals  · Lantus 26 units at bedtime  · Correctional algorithm insulin - will add on bedtime algorithm  Adjust SSI as needed  HTN  · BP more elevated since last night - trending in 170's - 180's  Suspect 2/2 acute pain and recent stress from surgery  · Home amlodipine and norvasc daily  · Monitor  If remains elevated, will need to adjust medications  Alzheimer's dementia  · Re-orient patient as necessary  · Caution with narcotics given risk of delirium     Depression  · Continue celexa and ritalin    Hx of pituitary tumor  · Continue bromocriptine  Disposition: per orthopedic team         Subjective:   Patient seen and examined this morning with family at bedside  Patient is post-op day 1 of a right femoral intramedulllary nail  She is in no acute distress  Not complaining of any pain, shortness of breath, or nausea  Per the son however, she does complain of pain if nursing staff attempts to move her           Vitals: Temp (24hrs), Av 8 °F (37 1 °C), Min:98 2 °F (36 8 °C), Max:99 6 °F (37 6 °C)  Current: Temperature: 98 2 °F (36 8 °C)  Vitals:    18 1958 18 2318 18 0251 18 0725   BP: (!) 194/81 (!) 179/76 (!) 180/78 (!) 173/74   BP Location: Right arm Right arm Right arm Left arm   Pulse: 87 88 79 92   Resp: 16 17 17 17   Temp: 99 2 °F (37 3 °C) 99 °F (37 2 °C) 98 4 °F (36 9 °C) 98 2 °F (36 8 °C)   TempSrc: Axillary Axillary Axillary Oral   SpO2: 98% 96% 97% 97%   Weight:       Height:        Body mass index is 30 3 kg/m²  I/O last 24 hours: In: 46 [P O :480; I V :1970]  Out: 12 [Urine:389; Blood:200]      Physical Exam:  Physical Exam   Constitutional: She appears well-developed and well-nourished  No distress  HENT:   Head: Normocephalic and atraumatic  Cardiovascular: Normal rate, regular rhythm, normal heart sounds and intact distal pulses  Pulmonary/Chest: Effort normal and breath sounds normal    Abdominal: Soft  There is no tenderness  Neurological: She is alert  She is disoriented  Skin: She is not diaphoretic           Invasive Devices     Peripheral Intravenous Line            Peripheral IV 07/18/18 Left Forearm 1 day                   Labs:   Recent Results (from the past 24 hour(s))   Fingerstick Glucose (POCT)    Collection Time: 07/18/18  8:56 AM   Result Value Ref Range    POC Glucose 228 (H) 65 - 140 mg/dl   Fingerstick Glucose (POCT)    Collection Time: 07/18/18 11:26 AM   Result Value Ref Range    POC Glucose 198 (H) 65 - 140 mg/dl   Fingerstick Glucose (POCT)    Collection Time: 07/18/18  3:35 PM   Result Value Ref Range    POC Glucose 220 (H) 65 - 140 mg/dl   Fingerstick Glucose (POCT)    Collection Time: 07/18/18  4:42 PM   Result Value Ref Range    POC Glucose 223 (H) 65 - 140 mg/dl   Fingerstick Glucose (POCT)    Collection Time: 07/18/18  9:32 PM   Result Value Ref Range    POC Glucose 285 (H) 65 - 140 mg/dl   Basic metabolic panel    Collection Time: 07/19/18  4:38 AM   Result Value Ref Range    Sodium 135 (L) 136 - 145 mmol/L    Potassium 4 5 3 5 - 5 3 mmol/L    Chloride 105 100 - 108 mmol/L    CO2 24 21 - 32 mmol/L    Anion Gap 6 4 - 13 mmol/L    BUN 19 5 - 25 mg/dL    Creatinine 0 70 0 60 - 1 30 mg/dL    Glucose 225 (H) 65 - 140 mg/dL    Calcium 10 1 8 3 - 10 1 mg/dL    eGFR 78 ml/min/1 73sq m   Fingerstick Glucose (POCT) Collection Time: 07/19/18  7:16 AM   Result Value Ref Range    POC Glucose 208 (H) 65 - 140 mg/dl   CBC and differential    Collection Time: 07/19/18  7:32 AM   Result Value Ref Range    WBC 8 56 4 31 - 10 16 Thousand/uL    RBC 3 28 (L) 3 81 - 5 12 Million/uL    Hemoglobin 9 4 (L) 11 5 - 15 4 g/dL    Hematocrit 29 4 (L) 34 8 - 46 1 %    MCV 90 82 - 98 fL    MCH 28 7 26 8 - 34 3 pg    MCHC 32 0 31 4 - 37 4 g/dL    RDW 14 6 11 6 - 15 1 %    MPV 9 3 8 9 - 12 7 fL    Platelets 876 228 - 646 Thousands/uL    nRBC 0 /100 WBCs    Neutrophils Relative 73 43 - 75 %    Immat GRANS % 1 0 - 2 %    Lymphocytes Relative 16 14 - 44 %    Monocytes Relative 10 4 - 12 %    Eosinophils Relative 0 0 - 6 %    Basophils Relative 0 0 - 1 %    Neutrophils Absolute 6 23 1 85 - 7 62 Thousands/µL    Immature Grans Absolute 0 05 0 00 - 0 20 Thousand/uL    Lymphocytes Absolute 1 39 0 60 - 4 47 Thousands/µL    Monocytes Absolute 0 86 0 17 - 1 22 Thousand/µL    Eosinophils Absolute 0 01 0 00 - 0 61 Thousand/µL    Basophils Absolute 0 02 0 00 - 0 10 Thousands/µL       Radiology Results: I have personally reviewed pertinent reports  Other Diagnostic Testing:   I have personally reviewed pertinent reports          Active Meds:   Current Facility-Administered Medications   Medication Dose Route Frequency    acetaminophen (TYLENOL) tablet 975 mg  975 mg Oral Q8H [facility]    amLODIPine (NORVASC) tablet 10 mg  10 mg Oral Daily    calcium carbonate (TUMS) chewable tablet 1,000 mg  1,000 mg Oral Daily PRN    citalopram (CeleXA) tablet 10 mg  10 mg Oral Daily    docusate sodium (COLACE) capsule 100 mg  100 mg Oral BID    enoxaparin (LOVENOX) subcutaneous injection 40 mg  40 mg Subcutaneous Q24H CRUZ    gabapentin (NEURONTIN) capsule 100 mg  100 mg Oral HS    insulin glargine (LANTUS) subcutaneous injection 26 Units 0 26 mL  26 Units Subcutaneous HS    insulin lispro (HumaLOG) 100 units/mL subcutaneous injection 1-5 Units  1-5 Units Subcutaneous TID AC    insulin lispro (HumaLOG) 100 units/mL subcutaneous injection 10 Units  10 Units Subcutaneous TID With Meals    lactated ringers infusion  75 mL/hr Intravenous Continuous    lidocaine (LIDODERM) 5 % patch 1 patch  1 patch Transdermal Daily    methocarbamol (ROBAXIN) tablet 500 mg  500 mg Oral Q6H PRN    methylphenidate (RITALIN) tablet 5 mg  5 mg Oral Daily    oxyCODONE (ROXICODONE) IR tablet 2 5 mg  2 5 mg Oral Q4H PRN    oxyCODONE (ROXICODONE) IR tablet 5 mg  5 mg Oral Q4H PRN    senna-docusate sodium (SENOKOT S) 8 6-50 mg per tablet 2 tablet  2 tablet Oral Daily    valsartan (DIOVAN) tablet 80 mg  80 mg Oral BID         VTE Pharmacologic Prophylaxis: Enoxaparin (Lovenox)  VTE Mechanical Prophylaxis: sequential compression device    [employer], DO

## 2018-07-19 NOTE — PLAN OF CARE
Problem: Potential for Falls  Goal: Patient will remain free of falls  INTERVENTIONS:  - Assess patient frequently for physical needs  -  Identify cognitive and physical deficits and behaviors that affect risk of falls    -  [place] fall precautions as indicated by assessment   - Educate patient/family on patient safety including physical limitations  - Instruct patient to call for assistance with activity based on assessment  - Modify environment to reduce risk of injury  - Consider OT/PT consult to assist with strengthening/mobility    Outcome: Progressing      Problem: Prexisting or High Potential for Compromised Skin Integrity  Goal: Skin integrity is maintained or improved  INTERVENTIONS:  - Identify patients at risk for skin breakdown  - Assess and monitor skin integrity  - Assess and monitor nutrition and hydration status  - Monitor labs (i e  albumin)  - Assess for incontinence   - Turn and reposition patient  - Assist with mobility/ambulation  - Relieve pressure over bony prominences  - Avoid friction and shearing  - Provide appropriate hygiene as needed including keeping skin clean and dry  - Evaluate need for skin moisturizer/barrier cream  - Collaborate with interdisciplinary team (i e  Nutrition, Rehabilitation, etc )   - Patient/family teaching   Outcome: Progressing      Problem: PAIN - ADULT  Goal: Verbalizes/displays adequate comfort level or baseline comfort level  Interventions:  - Encourage patient to monitor pain and request assistance  - Assess pain using appropriate pain scale  - Administer analgesics based on type and severity of pain and evaluate response  - Implement non-pharmacological measures as appropriate and evaluate response  - Consider cultural and social influences on pain and pain management  - Notify physician/advanced practitioner if interventions unsuccessful or patient reports new pain   Outcome: Progressing      Problem: INFECTION - ADULT  Goal: Absence or prevention of progression during hospitalization  INTERVENTIONS:  - Assess and monitor for signs and symptoms of infection  - Monitor lab/diagnostic results  - Monitor all insertion sites, i e  indwelling lines, tubes, and drains  - Monitor endotracheal (as able) and nasal secretions for changes in amount and color  - [place] appropriate cooling/warming therapies per order  - Administer medications as ordered  - Instruct and encourage patient and family to use good hand hygiene technique  - Identify and instruct in appropriate isolation precautions for identified infection/condition   Outcome: Progressing    Goal: Absence of fever/infection during neutropenic period  INTERVENTIONS:  - Monitor WBC  - Implement neutropenic guidelines   Outcome: Progressing      Problem: SAFETY ADULT  Goal: Maintain or return to baseline ADL function  INTERVENTIONS:  -  Assess patient's ability to carry out ADLs; assess patient's baseline for ADL function and identify physical deficits which impact ability to perform ADLs (bathing, care of mouth/teeth, toileting, grooming, dressing, etc )  - Assess/evaluate cause of self-care deficits   - Assess range of motion  - Assess patient's mobility; develop plan if impaired  - Assess patient's need for assistive devices and provide as appropriate  - Encourage maximum independence but intervene and supervise when necessary  ¯ Involve family in performance of ADLs  ¯ Assess for home care needs following discharge   ¯ Request OT consult to assist with ADL evaluation and planning for discharge  ¯ Provide patient education as appropriate   Outcome: Progressing    Goal: Maintain or return mobility status to optimal level  INTERVENTIONS:  - Assess patient's baseline mobility status (ambulation, transfers, stairs, etc )    - Identify cognitive and physical deficits and behaviors that affect mobility  - Identify mobility aids required to assist with transfers and/or ambulation (gait belt, sit-to-stand, lift, walker, cane, etc )  - [place] fall precautions as indicated by assessment  - Record patient progress and toleration of activity level on Mobility SBAR; progress patient to next Phase/Stage  - Instruct patient to call for assistance with activity based on assessment  - Request Rehabilitation consult to assist with strengthening/weightbearing, etc    Outcome: Progressing    Goal: Patient will remain free of falls  INTERVENTIONS:  - Assess patient frequently for physical needs  -  Identify cognitive and physical deficits and behaviors that affect risk of falls  -  [place] fall precautions as indicated by assessment   - Educate patient/family on patient safety including physical limitations  - Instruct patient to call for assistance with activity based on assessment  - Modify environment to reduce risk of injury  - Consider OT/PT consult to assist with strengthening/mobility    Outcome: Progressing      Problem: DISCHARGE PLANNING  Goal: Discharge to home or other facility with appropriate resources  INTERVENTIONS:  - Identify barriers to discharge w/patient and caregiver  - Arrange for needed discharge resources and transportation as appropriate  - Identify discharge learning needs (meds, wound care, etc )  - Arrange for interpretive services to assist at discharge as needed  - Refer to Case Management Department for coordinating discharge planning if the patient needs post-hospital services based on physician/advanced practitioner order or complex needs related to functional status, cognitive ability, or social support system   Outcome: Progressing      Problem: Knowledge Deficit  Goal: Patient/family/caregiver demonstrates understanding of disease process, treatment plan, medications, and discharge instructions  Complete learning assessment and assess knowledge base    Interventions:  - Provide teaching at level of understanding  - Provide teaching via preferred learning methods   Outcome: Progressing

## 2018-07-19 NOTE — OCCUPATIONAL THERAPY NOTE
[address]  Evaluation     Patient Name: Nhi PARKER Date: 7/19/2018  Problem List  Patient Active Problem List   Diagnosis    Abnormal gait    Abnormal weight gain    Abnormal weight loss    Acromegalia ([facility] [address] )    Alopecia    Alzheimers disease    Arthritis    Bilateral impacted cerumen    Depression    Fatigue    Hypercholesterolemia    Hypertension    Osteopenia    Sleep disorder    Type 2 diabetes mellitus ([facility] [address] )    Vitamin D deficiency    Candidal UTI (urinary tract infection)    RAZIA (obstructive sleep apnea)    Closed fracture of right hip [facility])     Past Medical History  Past Medical History:   Diagnosis Date    Ankle fracture     Dementia     Diabetes ([facility] [address] )     Hypertension     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     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HCC)      Past Surgical History  Past Surgical History:   Procedure Laterality Date    CARPAL TUNNEL RELEASE      OR OPEN RX FEMUR FX+INTRAMED RADHA Right 7/18/2018    Procedure: INSERTION NAIL IM FEMUR ANTEGRADE (TROCHANTERIC); Surgeon: Willow Petersen MD;  Location: [facility];  Service: Orthopedics         07/19/18 1330   Note Type   Note type Eval/Treat   Restrictions/Precautions   Weight Bearing Precautions Per Order Yes   RUE Weight Bearing Per Order WBAT   LUE Weight Bearing Per Order WBAT   RLE Weight Bearing Per Order WBAT   LLE Weight Bearing Per Order WBAT   Other Precautions Bed Alarm; Fall Risk;Pain;Cognitive   Pain Assessment   Pain Assessment FLACC   Pain Rating: FLACC (Rest) - Face 0   Pain Rating: FLACC (Rest) - Legs 0   Pain Rating: FLACC (Rest) - Activity 0   Pain Rating: FLACC (Rest) - Cry 0   Pain Rating: FLACC (Rest) - Consolability 0   Score: FLACC (Rest) 0   Pain Rating: FLACC (Activity) - Face 1   Pain Rating: FLACC (Activity) - Legs 1   Pain Rating: FLACC (Activity) - Activity 1   Pain Rating: FLACC (Activity) - Cry 1   Pain Rating: FLACC (Activity) - Consolability 1   Score: FLACC (Activity) 5 Home Living   Type of Home Assisted living  [facility]   Home Layout One level   Prior Function   Level of [place] Needs assistance with IADLs; Needs assistance with ADLs and functional mobility   Lives With Facility staff   Receives Help From Family;Personal care attendant   ADL Assistance Needs assistance   IADLs Needs assistance   Falls in the last 6 months 1 to 4   Vocational Retired   Lifestyle   Autonomy per records pt required assist with adls and assist x 1 for transfers - used rw for short distances and w/c for longer distances - meals/homemaking provided by facility   Reciprocal Relationships supportive family   Service to Others retired   Intrinsic Gratification sedentary   Subjective   Subjective offers no c/o   ADL   Eating Assistance 4  Minimal Assistance   Grooming Assistance 4  Minimal Assistance   UB [address] 3  Moderate Assistance   LB [address] 2  Maximal Assistance   [address] 3  Moderate Assistance   LB Dressing Assistance 2  Maximal [address]  2  Maximal Assistance   Bed Mobility   Rolling R 2  Maximal assistance   Additional items Assist x 2   Rolling L 2  Maximal assistance   Additional items Assist x 2   Activity Tolerance   Activity Tolerance Patient limited by fatigue;Patient limited by pain;Treatment limited secondary to medical complications (Comment)   RUE Assessment   RUE Assessment WFL   LUE Assessment   LUE Assessment WFL   Cognition   Overall Cognitive Status Impaired   Arousal/Participation Alert   Attention Attends with cues to redirect   Orientation Level Oriented to person   Memory Decreased short term memory;Decreased recall of recent events;Decreased recall of precautions   Following Commands Follows one step commands inconsistently   Comments sleeping on approach - diffiuclt to arouse - lethargic t/o   Assessment   Limitation Decreased ADL status; Decreased Safe judgement during ADL;Decreased cognition;Decreased endurance;Decreased self-care trans   Prognosis Good;Fair   Assessment Pt is a 80 y o  female who was admitted to [facility] on 7/17/2018 with Closed fracture of right hip (HCC)s/p IMN   Pt's problem list also includes PMH of DM, HTN, previous surgery, limited cognition, dementia and ankle fx, multiple endocrine neoplasia, pituitary abnormality  At baseline pt was completing adls with assist from facility staff and assist x 1 for transfers/mobility with rw - used w/c for longer distances  Pt lives at [facility] assisted living  Currently pt requires max assist for overall ADLS and max a x 2  for functional mobility/transfers  Pt currently presents with impairments in the following categories -difficulty performing ADLS, difficulty performing IADLS , limited insight into deficits, decreased initiation and engagement  and health management  activity tolerance, endurance, standing balance/tolerance, sitting balance/tolerance, arousal, memory, insight and safety   These impairments, as well as pt's fatigue, pain, orthopedic restricitions , WBS  and risk for falls  limit pt's ability to safely engage in all baseline areas of occupation, includingeating, grooming, bathing, dressing, toileting, functional mobility/transfers, social participation  and leisure activities  From OT standpoint, recommend inpt rehab  upon D/C  OT will continue to follow to address the below stated goals  Goals   Patient Goals none stated - pt unable to state 2* lethargy/cognitive status   LTG Time Frame 10-14   Long Term Goal #1 refer to established goals below   Plan   Treatment Interventions ADL retraining;Functional transfer training; Endurance training;Cognitive reorientation;Patient/family training;Equipment evaluation/education; Compensatory technique education; Activityengagement   Goal Expiration Date 08/02/18   OT Frequency 3-5x/wk   Recommendation   OT Discharge Recommendation Short Term Rehab   Barthel Index   Feeding 5   Bathing 0 Grooming Score 0   Dressing Score 0   Bladder Score 5   Bowels Score 5   Toilet Use Score 5   Transfers (Bed/Chair) Score 5   Mobility (Level Surface) Score 0   Stairs Score 5   Barthel Index Score 30       OCCUPATIONAL THERAPY GOALS:      *INCREASE AROUSAL TO 25-30 MIN/SESSION  *PT TO FOLLOW SIMPLE 1 STEP COMMANDS WITH 100% CONSISTENCY  *I FEEDING/GROOMING AFTER SETUP   *MOD A ADLS AFTER SETUP WITH USE OF ADAPTIVE DEVICES PRN  *MOD A TOILETING AND CLOTHING MANAGEMENT   *MOD A  FUNCTIONAL MOB AND TRANSFERS TO ALL SURFACES WITH FAIR TO FAIR+ BALANCE/SAFETY FOR PARTICIPATION IN DYNAMIC ADLS   *DEMONSTRATE FAIR+ CARRYOVER WITH SAFE USE OF RW DURING FUNCTIONAL TASKS  *ASSESS DME NEEDS  *INCREASE ACTIVITY TOLERANCE TO 35-40 MIN FOR PARTICIPATION IN ADLS AND ENJOYABLE ACTIVITIES     *PT TO PARTICIPATE IN ONGOING FUNCTIONAL COGNITIVE ASSESSMENT WITH GOOD ATTENTION/PARTICIPATION TO ASSIST WITH SAFE D/C RECOMMENDATIONS     Analilia Sahu, OT

## 2018-07-19 NOTE — PLAN OF CARE
Problem: OCCUPATIONAL THERAPY ADULT  Goal: Performs self-care activities at highest level of function for planned discharge setting  See evaluation for individualized goals  Treatment Interventions: ADL retraining, Functional transfer training, Endurance training, Cognitive reorientation, Patient/family training, Equipment evaluation/education, Compensatory technique education, Activityengagement          See flowsheet documentation for full assessment, interventions and recommendations  Limitation: Decreased ADL status, Decreased Safe judgement during ADL, Decreased cognition, Decreased endurance, Decreased self-care trans  Prognosis: Good, Fair  Assessment: Pt is a 80 y o  female who was admitted to [facility] on 7/17/2018 with Closed fracture of right hip (HCC)s/p IMN   Pt's problem list also includes PMH of DM, HTN, previous surgery, limited cognition, dementia and ankle fx, multiple endocrine neoplasia, pituitary abnormality  At baseline pt was completing adls with assist from facility staff and assist x 1 for transfers/mobility with rw - used w/c for longer distances  Pt lives at [facility] assisted living  Currently pt requires max assist for overall ADLS and max a x 2  for functional mobility/transfers  Pt currently presents with impairments in the following categories -difficulty performing ADLS, difficulty performing IADLS , limited insight into deficits, decreased initiation and engagement  and health management  activity tolerance, endurance, standing balance/tolerance, sitting balance/tolerance, arousal, memory, insight and safety   These impairments, as well as pt's fatigue, pain, orthopedic restricitions , WBS  and risk for falls  limit pt's ability to safely engage in all baseline areas of occupation, includingeating, grooming, bathing, dressing, toileting, functional mobility/transfers, social participation  and leisure activities  From OT standpoint, recommend inpt rehab  upon D/C   OT will continue to follow to address the below stated goals        OT Discharge Recommendation: Short Term Rehab

## 2018-07-19 NOTE — DISCHARGE INSTRUCTIONS
Discharge Instructions - Orthopedics  Yony Hughes 80 y o  female MRN: [id number]  Unit/Bed#: PACU 06    Weight Bearing Status:                                           As tolerated to right lower extremity    DVT prophylaxis:  Complete course of Lovenox as directed    Pain:  Continue analgesics as directed    Dressing Instructions:   Keep dressing clean, dry and intact until follow up appointment  Do not shower until staples removed    PT/OT:  Continue PT/OT on outpatient basis as directed    Appt Instructions: If you do not have your appointment, please call the clinic at [phone] to f/u with Dr Steph Leon in 2    Contact the office sooner if you experience any increased numbness/tingling in the extremities

## 2018-07-19 NOTE — PHYSICAL THERAPY NOTE
Physical Therapy Evaluation    Patient's Name:Sherie Monge    ICQKG'D Date:07/19/18    Patient Active Problem List   Diagnosis    Abnormal gait    Abnormal weight gain    Abnormal weight loss    Acromegalia ([facility] )    Alopecia    Alzheimers disease    Arthritis    Bilateral impacted cerumen    Depression    Fatigue    Hypercholesterolemia    Hypertension    Osteopenia    Sleep disorder    Type 2 diabetes mellitus (HCC)    Vitamin D deficiency    Candidal UTI (urinary tract infection)    RAZIA (obstructive sleep apnea)    Closed fracture of right hip ([facility])       Past Medical History:   Diagnosis Date    Ankle fracture     Dementia     Diabetes ([facility] )     Hypertension     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[facility] )     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HCC)        Past Surgical History:   Procedure Laterality Date    CARPAL TUNNEL RELEASE            07/19/18 1130   Pain Assessment   Pain Assessment FLACC   Pain Score No Pain   Hospital Pain Intervention(s) Repositioned   Response to Interventions increased discomfort w mob effort   Pain Rating: FLACC (Rest) - Face 0   Pain Rating: FLACC (Rest) - Legs 0   Pain Rating: FLACC (Rest) - Activity 0   Pain Rating: FLACC (Rest) - Cry 0   Pain Rating: FLACC (Rest) - Consolability 0   Score: FLACC (Rest) 0   Home Living   Type of Home Assisted living  [facility])   Home Layout One level   Prior Function   Level of [place] (ambulatory w RW)   Lives With Alone; Facility staff   Receives Help From Personal care attendant   Restrictions/Precautions   Weight Bearing Precautions Per Order Yes   RLE Weight Bearing Per Order WBAT   General   Family/Caregiver Present Yes   Cognition   Arousal/Participation Lethargic   Orientation Level Oriented to person   Following Commands Follows one step commands inconsistently   RUE Assessment   RUE Assessment (funct reach and grasp)   LUE Assessment   LUE Assessment (funct reach and grasp)   RLE Assessment   RLE Assessment X Strength RLE   RLE Overall Strength (hip fl NT knee ext 2-/5 ankle dorsifl 2+/5)   LLE Assessment   LLE Assessment X   Strength LLE   LLE Overall Strength 2+/5   Coordination   Movements are Fluid and Coordinated 0   Coordination and Movement Description antalgic LE instability   Bed Mobility   Rolling R 2  Maximal assistance   Additional items Assist x 2; Increased time required;Verbal cues;LE management   Rolling L 2  Maximal assistance   Additional items Assist x 2; Increased time required;Verbal cues;LE management   Supine to Sit 2  Maximal assistance   Additional items Assist x 2; Increased time required;Verbal cues;LE management   Transfers   Sit to Stand (failed stand trials)   Ambulation/Elevation   Gait pattern Not appropriate   Balance   Static Sitting Fair   Dynamic Sitting Poor +   Static Standing Zero   Endurance Deficit   Endurance Deficit Yes   Endurance Deficit Description pain fatigue   Activity Tolerance   Activity Tolerance Patient limited by fatigue;Patient limited by pain   Nurse Made Aware yes   Assessment   Prognosis Fair   Problem List Decreased strength;Decreased range of motion;Decreased mobility; Impaired balance;Decreased endurance;Decreased coordination;Decreased cognition; Impaired judgement;Decreased safety awareness;Pain;Orthopedic restrictions;Decreased skin integrity   Assessment Pt is a 80 y o  female admitted to [facility] on 7/17/2018 s/p fall w/ Closed fracture of right hip ([facility] [address] ); is s/p IMN and WBAT RLE Pt exhibits significant impairments with weakness, decreased ROM, impaired balance, decreased endurance, impaired coordination, gait deviations, pain, decreased activity tolerance, decreased functional mobility tolerance, decreased safety awareness, impaired judgement, fall risk, orthopedic restrictions, decreased skin integrity and decreased cognition; these impact independence with mobility, ADLs, and IADLs; requires max assistx2 w rolling R and L and sup-sit EOB; able to maintain self support ed EOB sit; failed 2 stand trials - pt appears to have cog motor processing limitations, also exhibts profound inability to clear ischium off surface; pt maintained flat affect, BP monitored WFL, pt determined to be not appropriate for OOB at this time and returned  Dep assist x2 to bed chair position; objective measures on the Barthel Index also reveal limitations;  therapy prognosis is impacted by relevant co morbidities as noted in evaluation; clinical presentation is currently unstable/unpredictable - pt is on cont pulse oximetry, presents w complex hx, cog and phys impairments as noted- a regression from baseline; PTA, pt was ambulatory w RW, fam present and report pt has difficulty w transf and amb skilled PT is indicated to to optimize functional independence and discharge planning; pending functional progress, PT recommendation at discharge is IP rehab    Goals   Patient Goals not expressed   STG Expiration Date 07/29/18   Short Term Goal #1  1  Minimum asist with Bed Mobility Rolling Right and Left     2  Minimum assist with Bed Mobility Supine-Sit     3  Minimum assist with Transfer Bed-Chair     4  Increase Dynamic Sitting Balance at least 1 Grade for improved stability with functional reach activities     5  Increase Dynamic Standing Balance at least 1 Grade for improved ease with Activities of Daily Living     6  Increase Lower Extremity Strength at least 1 Grade for improved ease mobility tasks     PT to see for progression of mob amb when able to egress test   Plan   Treatment/Interventions Elevations; Functional transfer training;LE strengthening/ROM; Therapeutic exercise; Endurance training;Cognitive reorientation;Patient/family training;Equipment eval/education; Bed mobility;Continued evaluation; Compensatory technique education;Gait training;Spoke to nursing;Family   PT Frequency (3-6x/wk)   Recommendation   Recommendation Post acute IP rehab   Barthel Index   Feeding 5   Bathing 0   Grooming Score 0   Dressing Score 0   Bladder Score 5   Bowels Score 10   Toilet Use Score 5   Transfers (Bed/Chair) Score 5   Mobility (Level Surface) Score 0   Stairs Score 0   Barthel Index Score 30

## 2018-07-19 NOTE — PLAN OF CARE
Problem: PHYSICAL THERAPY ADULT  Goal: Performs mobility at highest level of function for planned discharge setting  See evaluation for individualized goals  Treatment/Interventions: Elevations, Functional transfer training, LE strengthening/ROM, Therapeutic exercise, Endurance training, Cognitive reorientation, Patient/family training, Equipment eval/education, Bed mobility, Continued evaluation, Compensatory technique education, Gait training, Spoke to nursing, Family          See flowsheet documentation for full assessment, interventions and recommendations    Prognosis: Fair  Problem List: Decreased strength, Decreased range of motion, Decreased mobility, Impaired balance, Decreased endurance, Decreased coordination, Decreased cognition, Impaired judgement, Decreased safety awareness, Pain, Orthopedic restrictions, Decreased skin integrity  Assessment: Pt is a 80 y o  female admitted to [facility] on 7/17/2018 s/p fall w/ Closed fracture of right hip (HCC); is s/p IMN and WBAT RLE Pt exhibits significant impairments with weakness, decreased ROM, impaired balance, decreased endurance, impaired coordination, gait deviations, pain, decreased activity tolerance, decreased functional mobility tolerance, decreased safety awareness, impaired judgement, fall risk, orthopedic restrictions, decreased skin integrity and decreased cognition; these impact independence with mobility, ADLs, and IADLs; requires max assistx2 w rolling R and L and sup-sit EOB; able to maintain self support ed EOB sit; failed 2 stand trials - pt appears to have cog motor processing limitations, also exhibts profound inability to clear ischium off surface; pt maintained flat affect, BP monitored WFL, pt determined to be not appropriate for OOB at this time and returned  Dep assist x2 to bed chair position; objective measures on the Barthel Index also reveal limitations;  therapy prognosis is impacted by relevant co morbidities as noted in evaluation; clinical presentation is currently unstable/unpredictable - pt is on cont pulse oximetry, presents w complex hx, cog and phys impairments as noted- a regression from baseline; PTA, pt was ambulatory w RW, fam present and report pt has difficulty w transf and amb skilled PT is indicated to to optimize functional independence and discharge planning; pending functional progress, PT recommendation at discharge is IP rehab         Recommendation: Post acute IP rehab          See flowsheet documentation for full assessment

## 2018-07-19 NOTE — SOCIAL WORK
IP rehab follow up:     CM received call from [facility] at [address] rehab at [facility]; pt is accepted for IP rehab and they will have an available bed tomorrow  Report [phone]  Fax [phone]    CM called and left vm with pt's Dtr Dionte Rodriguez and CM called and spoke with pt's Son Dr Pal Rutledge to advise of rehab acceptance and dc plan for tomorrow  Son in agreement with dc plan; Dtr Dionte Rodriguez had asked for [place] rehab as 1st choice  CM called and spoke with [facility]; BLS  set for 1:30pm  CMN form completed and given with facesheet and facility transfer envelope to [employer]  CM received return call from Dtjossy Rashid; Dtr advised of pt's BLS  time and she is in agreement with the dc plan  Dtr denied any other dc needs at this time

## 2018-07-19 NOTE — PROGRESS NOTES
Orthopedics   Daryle Dikes 80 y o  female MRN: [id number]  Unit/Bed#: CW3 340-01      Subjective:  80 y  o female post operative day 1 right femoral intramedullary nail  Pt doing well  Pain controlled      Labs:    0  Lab Value Date/Time   HCT 37 4 07/18/2018 0559   HCT 35 9 07/17/2018 1435   HCT 41 3 05/12/2017 0924   HCT 43 3 08/17/2015 0931   HCT 42 0 02/27/2015 0931   HCT 40 3 10/16/2014 0954   HGB 12 1 07/18/2018 0559   HGB 11 6 07/17/2018 1435   HGB 13 6 05/12/2017 0924   HGB 14 6 08/17/2015 0931   HGB 13 6 02/27/2015 0931   HGB 12 9 10/16/2014 0954   INR 1 11 07/18/2018 0559   WBC 9 49 07/18/2018 0559   WBC 7 75 07/17/2018 1435   WBC 7 12 05/12/2017 0924   WBC 6 84 08/17/2015 0931   WBC 5 79 02/27/2015 0931   WBC 5 25 10/16/2014 0954       Meds:    Current Facility-Administered Medications:     acetaminophen (TYLENOL) tablet 975 mg, 975 mg, Oral, Q8H [facility], Frida Colindres MD, 975 mg at 07/19/18 0524    amLODIPine (NORVASC) tablet 10 mg, 10 mg, Oral, Daily, Casandra Easley MD, 10 mg at 07/18/18 0848    calcium carbonate (TUMS) chewable tablet 1,000 mg, 1,000 mg, Oral, Daily PRN, Casandra Easley MD    citalopram (CeleXA) tablet 10 mg, 10 mg, Oral, Daily, Casandra Easley MD, 10 mg at 07/18/18 0848    docusate sodium (COLACE) capsule 100 mg, 100 mg, Oral, BID, Frida Colindres MD, 100 mg at 07/18/18 1851    gabapentin (NEURONTIN) capsule 100 mg, 100 mg, Oral, HS, Casandra Easley MD, 100 mg at 07/18/18 2127    insulin glargine (LANTUS) subcutaneous injection 26 Units 0 26 mL, 26 Units, Subcutaneous, HS, Rupa Hernandez MD, 26 Units at 07/18/18 2246    insulin lispro (HumaLOG) 100 units/mL subcutaneous injection 1-5 Units, 1-5 Units, Subcutaneous, TID AC, 2 Units at 07/18/18 1852 **AND** Fingerstick Glucose (POCT), , , TID AC, Casandra Easley MD    insulin lispro (HumaLOG) 100 units/mL subcutaneous injection 10 Units, 10 Units, Subcutaneous, TID With Meals, Grisel Salazar DO, 10 Units at 07/18/18 1852    lactated ringers infusion, 75 mL/hr, Intravenous, Continuous, Lacie Saunders MD, Last Rate: 75 mL/hr at 07/19/18 0524, 75 mL/hr at 07/19/18 0524    lidocaine (LIDODERM) 5 % patch 1 patch, 1 patch, Transdermal, Daily, Aretha Ron MD, 1 patch at 07/18/18 1851    methocarbamol (ROBAXIN) tablet 500 mg, 500 mg, Oral, Q6H PRN, Aretha Ron MD, 500 mg at 07/18/18 0858    methylphenidate (RITALIN) tablet 5 mg, 5 mg, Oral, Daily, Aretha Ron MD, 5 mg at 07/18/18 0848    oxyCODONE (ROXICODONE) IR tablet 2 5 mg, 2 5 mg, Oral, Q4H PRN, Aretha Ron MD    oxyCODONE (ROXICODONE) IR tablet 5 mg, 5 mg, Oral, Q4H PRN, Aretha Ron MD, 5 mg at 07/18/18 2038    senna-docusate sodium (SENOKOT S) 8 6-50 mg per tablet 2 tablet, 2 tablet, Oral, Daily, Lacie Saunders MD, 2 tablet at 07/18/18 1851    valsartan (DIOVAN) tablet 80 mg, 80 mg, Oral, BID, Aretha Ron MD, 80 mg at 07/18/18 1851    Blood Culture:   No results found for: BLOODCX    Wound Culture:   No results found for: WOUNDCULT    Ins and Outs:  I/O last 24 hours: In: 2450 [P O :480; I V :1970]  Out: 877 [Urine:677; Blood:200]          Physical exam:  Vitals:    07/19/18 0251   BP: (!) 180/78   Pulse: 79   Resp: 17   Temp: 98 4 °F (36 9 °C)   SpO2: 97%     right lower extremity  · Dressing clean dry intact  · Sensation intact s/sp/dp/s  · 2+ dorsalis pedis pulse    _*_*_*_*_*_*_*_*_*_*_*_*_*_*_*_*_*_*_*_*_*_*_*_*_*_*_*_*_*_*_*_*_*_*_*_*_*_*_*_*_*    Assessment: 80 y  o female post operative day 1 right femur IMN   Pt doing well    Plan:  · Up and out of bed  · Weightbearing as tolerated  · PT/OT  · DVT prophylaxis  · Analgesics  · Dispo: Ortho will follow  · Will continue to assess for acute blood loss anemia    Jose Raul Fields

## 2018-07-19 NOTE — RESTORATIVE TECHNICIAN NOTE
Restorative Specialist Mobility Note       Activity: Bedrest, Dangle, Stand at bedside (attempted to get pt to chair)     Assistive Device: Front wheel walker     Ambulation Response: Tolerated fairly well  Repositioned: Supine (pt back in bed)                        Assisted PT during session  For all/additional information please see PT note(s)

## 2018-07-20 ENCOUNTER — APPOINTMENT (INPATIENT)
Dept: RADIOLOGY | Facility: HOSPITAL | Age: 83
DRG: 064 | End: 2018-07-20
Payer: MEDICARE

## 2018-07-20 ENCOUNTER — APPOINTMENT (EMERGENCY)
Dept: CT IMAGING | Facility: HOSPITAL | Age: 83
DRG: 064 | End: 2018-07-20
Payer: MEDICARE

## 2018-07-20 ENCOUNTER — HOSPITAL ENCOUNTER (INPATIENT)
Facility: HOSPITAL | Age: 83
LOS: 4 days | DRG: 064 | End: 2018-07-24
Attending: EMERGENCY MEDICINE | Admitting: INTERNAL MEDICINE
Payer: MEDICARE

## 2018-07-20 ENCOUNTER — HOSPITAL ENCOUNTER (OUTPATIENT)
Facility: HOSPITAL | Age: 83
Discharge: ANOTHER HOSPITAL | End: 2018-07-20
Attending: PHYSICAL MEDICINE & REHABILITATION | Admitting: PHYSICAL MEDICINE & REHABILITATION

## 2018-07-20 VITALS
SYSTOLIC BLOOD PRESSURE: 163 MMHG | HEART RATE: 72 BPM | BODY MASS INDEX: 30.24 KG/M2 | HEIGHT: 59 IN | OXYGEN SATURATION: 96 % | DIASTOLIC BLOOD PRESSURE: 71 MMHG | TEMPERATURE: 98.3 F | RESPIRATION RATE: 18 BRPM | WEIGHT: 150 LBS

## 2018-07-20 VITALS
HEART RATE: 81 BPM | DIASTOLIC BLOOD PRESSURE: 84 MMHG | SYSTOLIC BLOOD PRESSURE: 182 MMHG | TEMPERATURE: 99.3 F | RESPIRATION RATE: 18 BRPM | OXYGEN SATURATION: 94 %

## 2018-07-20 DIAGNOSIS — R26.9 ABNORMAL GAIT: ICD-10-CM

## 2018-07-20 DIAGNOSIS — Z79.4 TYPE 2 DIABETES MELLITUS WITH COMPLICATION, WITH LONG-TERM CURRENT USE OF INSULIN (HCC): ICD-10-CM

## 2018-07-20 DIAGNOSIS — Z98.890 S/P ORIF (OPEN REDUCTION INTERNAL FIXATION) FRACTURE: ICD-10-CM

## 2018-07-20 DIAGNOSIS — I63.9 CVA (CEREBRAL VASCULAR ACCIDENT) (HCC): Primary | ICD-10-CM

## 2018-07-20 DIAGNOSIS — E11.8 TYPE 2 DIABETES MELLITUS WITH COMPLICATION, WITH LONG-TERM CURRENT USE OF INSULIN (HCC): Chronic | ICD-10-CM

## 2018-07-20 DIAGNOSIS — Z87.81 S/P ORIF (OPEN REDUCTION INTERNAL FIXATION) FRACTURE: ICD-10-CM

## 2018-07-20 DIAGNOSIS — K59.00 CONSTIPATION: ICD-10-CM

## 2018-07-20 DIAGNOSIS — S72.001D CLOSED FRACTURE OF RIGHT HIP WITH ROUTINE HEALING, SUBSEQUENT ENCOUNTER: Primary | ICD-10-CM

## 2018-07-20 DIAGNOSIS — I10 ACCELERATED HYPERTENSION: ICD-10-CM

## 2018-07-20 DIAGNOSIS — Z79.4 TYPE 2 DIABETES MELLITUS WITH COMPLICATION, WITH LONG-TERM CURRENT USE OF INSULIN (HCC): Chronic | ICD-10-CM

## 2018-07-20 DIAGNOSIS — D64.9 ANEMIA: ICD-10-CM

## 2018-07-20 DIAGNOSIS — E11.8 TYPE 2 DIABETES MELLITUS WITH COMPLICATION, WITH LONG-TERM CURRENT USE OF INSULIN (HCC): ICD-10-CM

## 2018-07-20 DIAGNOSIS — T14.8XXA BLISTER: ICD-10-CM

## 2018-07-20 PROBLEM — E11.9 TYPE 2 DIABETES MELLITUS, WITH LONG-TERM CURRENT USE OF INSULIN (HCC): Chronic | Status: ACTIVE | Noted: 2017-08-15

## 2018-07-20 PROBLEM — E83.52 HYPERCALCEMIA: Chronic | Status: ACTIVE | Noted: 2018-07-20

## 2018-07-20 PROBLEM — E55.9 VITAMIN D DEFICIENCY: Chronic | Status: ACTIVE | Noted: 2017-08-15

## 2018-07-20 PROBLEM — R53.1 ACUTE RIGHT-SIDED WEAKNESS: Status: ACTIVE | Noted: 2018-07-20

## 2018-07-20 PROBLEM — S72.001A CLOSED FRACTURE OF RIGHT HIP (HCC): Chronic | Status: ACTIVE | Noted: 2018-07-17

## 2018-07-20 PROBLEM — E83.52 HYPERCALCEMIA: Status: ACTIVE | Noted: 2018-07-20

## 2018-07-20 LAB
ALBUMIN SERPL BCP-MCNC: 3.7 G/DL (ref 3–5.2)
ALP SERPL-CCNC: 76 U/L (ref 43–122)
ALT SERPL W P-5'-P-CCNC: 36 U/L (ref 9–52)
ANION GAP SERPL CALCULATED.3IONS-SCNC: 7 MMOL/L (ref 5–14)
AST SERPL W P-5'-P-CCNC: 55 U/L (ref 14–36)
BASOPHILS # BLD AUTO: 0.11 THOUSAND/UL (ref 0–0.1)
BASOPHILS NFR MAR MANUAL: 1 % (ref 0–1)
BILIRUB SERPL-MCNC: 0.7 MG/DL
BILIRUB UR QL STRIP: NEGATIVE
BUN SERPL-MCNC: 19 MG/DL (ref 5–25)
CALCIUM SERPL-MCNC: 10.5 MG/DL (ref 8.4–10.2)
CHLORIDE SERPL-SCNC: 104 MMOL/L (ref 97–108)
CLARITY UR: CLEAR
CO2 SERPL-SCNC: 27 MMOL/L (ref 22–30)
COLOR UR: ABNORMAL
CREAT SERPL-MCNC: 0.48 MG/DL (ref 0.6–1.2)
EOSINOPHIL # BLD AUTO: 0.34 THOUSAND/UL (ref 0–0.4)
EOSINOPHIL NFR BLD MANUAL: 3 % (ref 0–6)
ERYTHROCYTE [DISTWIDTH] IN BLOOD BY AUTOMATED COUNT: 15 %
GFR SERPL CREATININE-BSD FRML MDRD: 88 ML/MIN/1.73SQ M
GLUCOSE SERPL-MCNC: 117 MG/DL (ref 70–99)
GLUCOSE SERPL-MCNC: 141 MG/DL (ref 70–99)
GLUCOSE SERPL-MCNC: 171 MG/DL (ref 65–140)
GLUCOSE SERPL-MCNC: 173 MG/DL (ref 65–140)
GLUCOSE UR STRIP-MCNC: NEGATIVE MG/DL
HCT VFR BLD AUTO: 33.1 % (ref 36–46)
HGB BLD-MCNC: 11 G/DL (ref 12–16)
HGB UR QL STRIP.AUTO: NEGATIVE
HOLD SPECIMEN: NORMAL
HYPERCHROMIA BLD QL SMEAR: PRESENT
KETONES UR STRIP-MCNC: ABNORMAL MG/DL
LEUKOCYTE ESTERASE UR QL STRIP: NEGATIVE
LYMPHOCYTES # BLD AUTO: 1.7 THOUSAND/UL (ref 0.5–4)
LYMPHOCYTES # BLD AUTO: 15 % (ref 20–50)
MCH RBC QN AUTO: 29.5 PG (ref 26.8–34.3)
MCHC RBC AUTO-ENTMCNC: 33.3 G/DL (ref 31.4–37.4)
MCV RBC AUTO: 89 FL (ref 82–98)
MONOCYTES # BLD AUTO: 0.34 THOUSAND/UL (ref 0.2–0.9)
MONOCYTES NFR BLD AUTO: 3 % (ref 1–10)
MYELOCYTES NFR BLD MANUAL: 1 % (ref 0–1)
NEUTS BAND NFR BLD MANUAL: 15 % (ref 0–8)
NEUTS SEG # BLD: 8.59 THOUSAND/UL (ref 1.8–7.8)
NEUTS SEG NFR BLD AUTO: 61 %
NITRITE UR QL STRIP: NEGATIVE
PH UR STRIP.AUTO: 6 [PH] (ref 4.5–8)
PLATELET # BLD AUTO: 212 THOUSANDS/UL (ref 150–450)
PLATELET BLD QL SMEAR: ADEQUATE
PMV BLD AUTO: 7.2 FL (ref 8.9–12.7)
POTASSIUM SERPL-SCNC: 4.3 MMOL/L (ref 3.6–5)
PROT SERPL-MCNC: 7.1 G/DL (ref 5.9–8.4)
PROT UR STRIP-MCNC: NEGATIVE MG/DL
RBC # BLD AUTO: 3.74 MILLION/UL (ref 4–5.2)
RBC MORPH BLD: ABNORMAL
SODIUM SERPL-SCNC: 138 MMOL/L (ref 137–147)
SP GR UR STRIP.AUTO: 1.02 (ref 1–1.04)
TOTAL CELLS COUNTED SPEC: 100
TROPONIN I SERPL-MCNC: <0.01 NG/ML (ref 0–0.03)
UROBILINOGEN UA: 1 MG/DL
VARIANT LYMPHS # BLD AUTO: 1 % (ref 0–0)
WBC # BLD AUTO: 11.3 THOUSAND/UL (ref 4.31–10.16)

## 2018-07-20 PROCEDURE — 82652 VIT D 1 25-DIHYDROXY: CPT | Performed by: FAMILY MEDICINE

## 2018-07-20 PROCEDURE — 85007 BL SMEAR W/DIFF WBC COUNT: CPT | Performed by: EMERGENCY MEDICINE

## 2018-07-20 PROCEDURE — 80053 COMPREHEN METABOLIC PANEL: CPT | Performed by: EMERGENCY MEDICINE

## 2018-07-20 PROCEDURE — 82948 REAGENT STRIP/BLOOD GLUCOSE: CPT

## 2018-07-20 PROCEDURE — 85027 COMPLETE CBC AUTOMATED: CPT | Performed by: EMERGENCY MEDICINE

## 2018-07-20 PROCEDURE — 70450 CT HEAD/BRAIN W/O DYE: CPT

## 2018-07-20 PROCEDURE — 81003 URINALYSIS AUTO W/O SCOPE: CPT | Performed by: EMERGENCY MEDICINE

## 2018-07-20 PROCEDURE — 84484 ASSAY OF TROPONIN QUANT: CPT | Performed by: EMERGENCY MEDICINE

## 2018-07-20 PROCEDURE — 36415 COLL VENOUS BLD VENIPUNCTURE: CPT | Performed by: EMERGENCY MEDICINE

## 2018-07-20 PROCEDURE — 93005 ELECTROCARDIOGRAM TRACING: CPT

## 2018-07-20 PROCEDURE — 99223 1ST HOSP IP/OBS HIGH 75: CPT | Performed by: HOSPITALIST

## 2018-07-20 PROCEDURE — 99024 POSTOP FOLLOW-UP VISIT: CPT | Performed by: ORTHOPAEDIC SURGERY

## 2018-07-20 PROCEDURE — 99285 EMERGENCY DEPT VISIT HI MDM: CPT

## 2018-07-20 PROCEDURE — 71045 X-RAY EXAM CHEST 1 VIEW: CPT

## 2018-07-20 RX ORDER — FENTANYL CITRATE 50 UG/ML
25 INJECTION, SOLUTION INTRAMUSCULAR; INTRAVENOUS ONCE
Status: COMPLETED | OUTPATIENT
Start: 2018-07-20 | End: 2018-07-20

## 2018-07-20 RX ORDER — ONDANSETRON 2 MG/ML
4 INJECTION INTRAMUSCULAR; INTRAVENOUS EVERY 6 HOURS PRN
Status: DISCONTINUED | OUTPATIENT
Start: 2018-07-20 | End: 2018-07-24 | Stop reason: HOSPADM

## 2018-07-20 RX ORDER — LIDOCAINE 50 MG/G
1 PATCH TOPICAL DAILY
Status: DISCONTINUED | OUTPATIENT
Start: 2018-07-21 | End: 2018-07-20 | Stop reason: HOSPADM

## 2018-07-20 RX ORDER — FENTANYL CITRATE 50 UG/ML
INJECTION, SOLUTION INTRAMUSCULAR; INTRAVENOUS
Status: COMPLETED
Start: 2018-07-20 | End: 2018-07-20

## 2018-07-20 RX ORDER — LANOLIN ALCOHOL/MO/W.PET/CERES
1000 CREAM (GRAM) TOPICAL DAILY
Status: DISCONTINUED | OUTPATIENT
Start: 2018-07-21 | End: 2018-07-24 | Stop reason: HOSPADM

## 2018-07-20 RX ORDER — LABETALOL HYDROCHLORIDE 5 MG/ML
10 INJECTION, SOLUTION INTRAVENOUS EVERY 6 HOURS PRN
Status: DISCONTINUED | OUTPATIENT
Start: 2018-07-20 | End: 2018-07-24 | Stop reason: HOSPADM

## 2018-07-20 RX ORDER — MELATONIN
1000 DAILY
Status: DISCONTINUED | OUTPATIENT
Start: 2018-07-20 | End: 2018-07-20 | Stop reason: HOSPADM

## 2018-07-20 RX ORDER — DOCUSATE SODIUM 100 MG/1
100 CAPSULE, LIQUID FILLED ORAL 2 TIMES DAILY
Status: DISCONTINUED | OUTPATIENT
Start: 2018-07-20 | End: 2018-07-20 | Stop reason: HOSPADM

## 2018-07-20 RX ORDER — OXYCODONE HYDROCHLORIDE 5 MG/1
2.5 TABLET ORAL EVERY 4 HOURS PRN
Status: DISCONTINUED | OUTPATIENT
Start: 2018-07-20 | End: 2018-07-20 | Stop reason: HOSPADM

## 2018-07-20 RX ORDER — DOCUSATE SODIUM 100 MG/1
100 CAPSULE, LIQUID FILLED ORAL 2 TIMES DAILY
Status: DISCONTINUED | OUTPATIENT
Start: 2018-07-20 | End: 2018-07-20

## 2018-07-20 RX ORDER — MELATONIN 10 MG
TABLET, SUBLINGUAL SUBLINGUAL DAILY
Status: DISCONTINUED | OUTPATIENT
Start: 2018-07-21 | End: 2018-07-21

## 2018-07-20 RX ORDER — CITALOPRAM 20 MG/1
10 TABLET ORAL DAILY
Status: DISCONTINUED | OUTPATIENT
Start: 2018-07-21 | End: 2018-07-20

## 2018-07-20 RX ORDER — SENNOSIDES 8.6 MG
1 TABLET ORAL DAILY
Status: DISCONTINUED | OUTPATIENT
Start: 2018-07-21 | End: 2018-07-24 | Stop reason: HOSPADM

## 2018-07-20 RX ORDER — ATORVASTATIN CALCIUM 40 MG/1
40 TABLET, FILM COATED ORAL EVERY EVENING
Status: DISCONTINUED | OUTPATIENT
Start: 2018-07-20 | End: 2018-07-24 | Stop reason: HOSPADM

## 2018-07-20 RX ORDER — VALSARTAN 160 MG/1
160 TABLET ORAL DAILY
Status: DISCONTINUED | OUTPATIENT
Start: 2018-07-21 | End: 2018-07-20 | Stop reason: HOSPADM

## 2018-07-20 RX ORDER — AMOXICILLIN 250 MG
2 CAPSULE ORAL DAILY
Status: DISCONTINUED | OUTPATIENT
Start: 2018-07-21 | End: 2018-07-20

## 2018-07-20 RX ORDER — INSULIN GLARGINE 100 [IU]/ML
26 INJECTION, SOLUTION SUBCUTANEOUS
Status: DISCONTINUED | OUTPATIENT
Start: 2018-07-20 | End: 2018-07-20

## 2018-07-20 RX ORDER — BROMOCRIPTINE MESYLATE 2.5 MG/1
2.5 TABLET ORAL
Status: DISCONTINUED | OUTPATIENT
Start: 2018-07-20 | End: 2018-07-20 | Stop reason: HOSPADM

## 2018-07-20 RX ORDER — GABAPENTIN 100 MG/1
100 CAPSULE ORAL
Status: DISCONTINUED | OUTPATIENT
Start: 2018-07-20 | End: 2018-07-20

## 2018-07-20 RX ORDER — GABAPENTIN 100 MG/1
100 CAPSULE ORAL
Status: DISCONTINUED | OUTPATIENT
Start: 2018-07-20 | End: 2018-07-20 | Stop reason: HOSPADM

## 2018-07-20 RX ORDER — BROMOCRIPTINE MESYLATE 2.5 MG/1
2.5 TABLET ORAL DAILY
Status: DISCONTINUED | OUTPATIENT
Start: 2018-07-21 | End: 2018-07-24 | Stop reason: HOSPADM

## 2018-07-20 RX ORDER — ACETAMINOPHEN 325 MG/1
650 TABLET ORAL 3 TIMES DAILY
Status: DISCONTINUED | OUTPATIENT
Start: 2018-07-20 | End: 2018-07-20 | Stop reason: HOSPADM

## 2018-07-20 RX ORDER — AMOXICILLIN 250 MG
2 CAPSULE ORAL DAILY
Status: DISCONTINUED | OUTPATIENT
Start: 2018-07-20 | End: 2018-07-20 | Stop reason: HOSPADM

## 2018-07-20 RX ORDER — LANOLIN ALCOHOL/MO/W.PET/CERES
1000 CREAM (GRAM) TOPICAL DAILY
Status: DISCONTINUED | OUTPATIENT
Start: 2018-07-20 | End: 2018-07-20 | Stop reason: HOSPADM

## 2018-07-20 RX ORDER — CITALOPRAM 10 MG/1
10 TABLET ORAL DAILY
Status: DISCONTINUED | OUTPATIENT
Start: 2018-07-21 | End: 2018-07-24 | Stop reason: HOSPADM

## 2018-07-20 RX ORDER — ASPIRIN 81 MG/1
81 TABLET, CHEWABLE ORAL DAILY
Status: DISCONTINUED | OUTPATIENT
Start: 2018-07-21 | End: 2018-07-24 | Stop reason: HOSPADM

## 2018-07-20 RX ORDER — BROMOCRIPTINE MESYLATE 2.5 MG/1
2.5 TABLET ORAL
Status: DISCONTINUED | OUTPATIENT
Start: 2018-07-20 | End: 2018-07-20

## 2018-07-20 RX ORDER — MORPHINE SULFATE 2 MG/ML
1 INJECTION, SOLUTION INTRAMUSCULAR; INTRAVENOUS EVERY 4 HOURS PRN
Status: DISCONTINUED | OUTPATIENT
Start: 2018-07-20 | End: 2018-07-21

## 2018-07-20 RX ORDER — SODIUM CHLORIDE 9 MG/ML
75 INJECTION, SOLUTION INTRAVENOUS CONTINUOUS
Status: DISCONTINUED | OUTPATIENT
Start: 2018-07-20 | End: 2018-07-21

## 2018-07-20 RX ORDER — ACETAMINOPHEN 650 MG/1
650 SUPPOSITORY RECTAL EVERY 6 HOURS PRN
Status: DISCONTINUED | OUTPATIENT
Start: 2018-07-20 | End: 2018-07-24 | Stop reason: HOSPADM

## 2018-07-20 RX ORDER — METHYLPHENIDATE HYDROCHLORIDE 10 MG/1
10 TABLET ORAL
Status: DISCONTINUED | OUTPATIENT
Start: 2018-07-21 | End: 2018-07-24 | Stop reason: HOSPADM

## 2018-07-20 RX ORDER — METHYLPHENIDATE HYDROCHLORIDE 5 MG/1
5 TABLET ORAL DAILY
Status: DISCONTINUED | OUTPATIENT
Start: 2018-07-20 | End: 2018-07-20

## 2018-07-20 RX ORDER — INSULIN GLARGINE 100 [IU]/ML
26 INJECTION, SOLUTION SUBCUTANEOUS EVERY 12 HOURS SCHEDULED
Status: DISCONTINUED | OUTPATIENT
Start: 2018-07-20 | End: 2018-07-20 | Stop reason: HOSPADM

## 2018-07-20 RX ORDER — METHYLPHENIDATE HYDROCHLORIDE 5 MG/1
5 TABLET ORAL DAILY
Status: DISCONTINUED | OUTPATIENT
Start: 2018-07-21 | End: 2018-07-20 | Stop reason: HOSPADM

## 2018-07-20 RX ORDER — DOCUSATE SODIUM 100 MG/1
100 CAPSULE, LIQUID FILLED ORAL 2 TIMES DAILY
Status: DISCONTINUED | OUTPATIENT
Start: 2018-07-21 | End: 2018-07-24 | Stop reason: HOSPADM

## 2018-07-20 RX ORDER — AMLODIPINE BESYLATE 5 MG/1
10 TABLET ORAL DAILY
Status: DISCONTINUED | OUTPATIENT
Start: 2018-07-21 | End: 2018-07-20 | Stop reason: HOSPADM

## 2018-07-20 RX ORDER — CITALOPRAM 10 MG/1
5 TABLET ORAL DAILY
Status: DISCONTINUED | OUTPATIENT
Start: 2018-07-21 | End: 2018-07-20 | Stop reason: HOSPADM

## 2018-07-20 RX ORDER — AMLODIPINE BESYLATE 5 MG/1
10 TABLET ORAL DAILY
Status: DISCONTINUED | OUTPATIENT
Start: 2018-07-20 | End: 2018-07-20

## 2018-07-20 RX ORDER — LABETALOL HYDROCHLORIDE 5 MG/ML
10 INJECTION, SOLUTION INTRAVENOUS EVERY 6 HOURS PRN
Status: DISCONTINUED | OUTPATIENT
Start: 2018-07-20 | End: 2018-07-20

## 2018-07-20 RX ORDER — GABAPENTIN 100 MG/1
100 CAPSULE ORAL
Status: DISCONTINUED | OUTPATIENT
Start: 2018-07-20 | End: 2018-07-22

## 2018-07-20 RX ORDER — OXYCODONE HYDROCHLORIDE 5 MG/1
2.5 TABLET ORAL EVERY 4 HOURS PRN
Status: DISCONTINUED | OUTPATIENT
Start: 2018-07-20 | End: 2018-07-24 | Stop reason: HOSPADM

## 2018-07-20 RX ORDER — AMLODIPINE BESYLATE 10 MG/1
10 TABLET ORAL DAILY
Status: DISCONTINUED | OUTPATIENT
Start: 2018-07-21 | End: 2018-07-21

## 2018-07-20 RX ORDER — LIDOCAINE 50 MG/G
1 PATCH TOPICAL DAILY
Status: DISCONTINUED | OUTPATIENT
Start: 2018-07-21 | End: 2018-07-24 | Stop reason: HOSPADM

## 2018-07-20 RX ADMIN — ACETAMINOPHEN 975 MG: 325 TABLET ORAL at 05:54

## 2018-07-20 RX ADMIN — VALSARTAN 80 MG: 40 TABLET, FILM COATED ORAL at 09:14

## 2018-07-20 RX ADMIN — DOCUSATE SODIUM 100 MG: 100 CAPSULE, LIQUID FILLED ORAL at 09:15

## 2018-07-20 RX ADMIN — CITALOPRAM HYDROBROMIDE 10 MG: 10 TABLET ORAL at 09:14

## 2018-07-20 RX ADMIN — INSULIN LISPRO 10 UNITS: 100 INJECTION, SOLUTION INTRAVENOUS; SUBCUTANEOUS at 09:12

## 2018-07-20 RX ADMIN — FENTANYL CITRATE 25 MCG: 50 INJECTION INTRAMUSCULAR; INTRAVENOUS at 22:03

## 2018-07-20 RX ADMIN — INSULIN LISPRO 10 UNITS: 100 INJECTION, SOLUTION INTRAVENOUS; SUBCUTANEOUS at 11:23

## 2018-07-20 RX ADMIN — SENNOSIDES AND DOCUSATE SODIUM 2 TABLET: 8.6; 5 TABLET ORAL at 09:15

## 2018-07-20 RX ADMIN — FENTANYL CITRATE 25 MCG: 50 INJECTION, SOLUTION INTRAMUSCULAR; INTRAVENOUS at 22:03

## 2018-07-20 RX ADMIN — LIDOCAINE 1 PATCH: 50 PATCH TOPICAL at 09:16

## 2018-07-20 RX ADMIN — INSULIN LISPRO 1 UNITS: 100 INJECTION, SOLUTION INTRAVENOUS; SUBCUTANEOUS at 11:24

## 2018-07-20 RX ADMIN — METHYLPHENIDATE HYDROCHLORIDE 5 MG: 10 TABLET ORAL at 09:15

## 2018-07-20 RX ADMIN — OXYCODONE HYDROCHLORIDE 5 MG: 5 TABLET ORAL at 05:54

## 2018-07-20 RX ADMIN — AMLODIPINE BESYLATE 10 MG: 10 TABLET ORAL at 09:15

## 2018-07-20 RX ADMIN — ENOXAPARIN SODIUM 40 MG: 40 INJECTION SUBCUTANEOUS at 09:15

## 2018-07-20 RX ADMIN — INSULIN LISPRO 1 UNITS: 100 INJECTION, SOLUTION INTRAVENOUS; SUBCUTANEOUS at 09:13

## 2018-07-20 NOTE — PROGRESS NOTES
Orthopedics   Uziel Keller 80 y o  female MRN: [id number]  Unit/Bed#: CW3 340-01      Subjective:  80 y  o female post operative day 2 right femoral intramedullary nail   No complaints    Labs:    0  Lab Value Date/Time   HCT 29 4 (L) 07/19/2018 0732   HCT 37 4 07/18/2018 0559   HCT 35 9 07/17/2018 1435   HCT 43 3 08/17/2015 0931   HCT 42 0 02/27/2015 0931   HCT 40 3 10/16/2014 0954   HGB 9 4 (L) 07/19/2018 0732   HGB 12 1 07/18/2018 0559   HGB 11 6 07/17/2018 1435   HGB 14 6 08/17/2015 0931   HGB 13 6 02/27/2015 0931   HGB 12 9 10/16/2014 0954   INR 1 11 07/18/2018 0559   WBC 8 56 07/19/2018 0732   WBC 9 49 07/18/2018 0559   WBC 7 75 07/17/2018 1435   WBC 6 84 08/17/2015 0931   WBC 5 79 02/27/2015 0931   WBC 5 25 10/16/2014 0954       Meds:    Current Facility-Administered Medications:     acetaminophen (TYLENOL) tablet 975 mg, 975 mg, Oral, Q8H [address], Caitlyn Dewitt MD, 975 mg at 07/20/18 0554    amLODIPine (NORVASC) tablet 10 mg, 10 mg, Oral, Daily, Dat Ng MD, 10 mg at 07/19/18 0818    calcium carbonate (TUMS) chewable tablet 1,000 mg, 1,000 mg, Oral, Daily PRN, Dat Ng MD    citalopram (CeleXA) tablet 10 mg, 10 mg, Oral, Daily, Dat Ng MD, 10 mg at 07/19/18 0818    docusate sodium (COLACE) capsule 100 mg, 100 mg, Oral, BID, Caitlyn Dewitt MD, 100 mg at 07/19/18 1806    enoxaparin (LOVENOX) subcutaneous injection 40 mg, 40 mg, Subcutaneous, Q24H [address], Caitlyn Dewitt MD, 40 mg at 07/19/18 0924    gabapentin (NEURONTIN) capsule 100 mg, 100 mg, Oral, HS, Dat Ng MD, 100 mg at 07/19/18 2144    insulin glargine (LANTUS) subcutaneous injection 26 Units 0 26 mL, 26 Units, Subcutaneous, HS, Ottoniel Alvarenga MD, 26 Units at 07/19/18 2145    insulin lispro (HumaLOG) 100 units/mL subcutaneous injection 1-5 Units, 1-5 Units, Subcutaneous, TID AC **AND** Fingerstick Glucose (POCT), , , 4x Daily AC and at bedtime, [place] Clem Bolton DO    insulin lispro (HumaLOG) 100 units/mL subcutaneous injection 1-5 Units, 1-5 Units, Subcutaneous, HS, Milagros Bolton, DO, 1 Units at 07/19/18 2139    insulin lispro (HumaLOG) 100 units/mL subcutaneous injection 10 Units, 10 Units, Subcutaneous, TID With Meals, Sriram Muñiz, DO, 10 Units at 07/19/18 1805    lactated ringers infusion, 75 mL/hr, Intravenous, Continuous, Edward Weems MD, Last Rate: 75 mL/hr at 07/19/18 0524, 75 mL/hr at 07/19/18 0524    lidocaine (LIDODERM) 5 % patch 1 patch, 1 patch, Transdermal, Daily, Jimbo Silva MD, 1 patch at 07/19/18 1808    methocarbamol (ROBAXIN) tablet 500 mg, 500 mg, Oral, Q6H PRN, Jimbo Silva MD, 500 mg at 07/18/18 0858    methylphenidate (RITALIN) tablet 5 mg, 5 mg, Oral, Daily, Jimbo Silva MD, 5 mg at 07/19/18 0818    oxyCODONE (ROXICODONE) IR tablet 2 5 mg, 2 5 mg, Oral, Q4H PRN, Jimbo Silva MD    oxyCODONE (ROXICODONE) IR tablet 5 mg, 5 mg, Oral, Q4H PRN, Jimbo Silva MD, 5 mg at 07/20/18 0554    senna-docusate sodium (SENOKOT S) 8 6-50 mg per tablet 2 tablet, 2 tablet, Oral, Daily, Edward Weems MD, 2 tablet at 07/19/18 0818    valsartan (DIOVAN) tablet 80 mg, 80 mg, Oral, BID, Jimbo Silva MD, 80 mg at 07/19/18 1803    Blood Culture:   No results found for: BLOODCX    Wound Culture:   No results found for: WOUNDCULT    Ins and Outs:  I/O last 24 hours: In: 1020 [I V :1020]  Out: 2073 [Urine:2073]          Physical exam:  Vitals:    07/20/18 0256   BP:    Pulse:    Resp:    Temp:    SpO2: 96%     right lower extremity  · Dressing is clean dry intact  · Sensation intact s/sp/dp/s  · 2+ dorsalis pedis pulse    _*_*_*_*_*_*_*_*_*_*_*_*_*_*_*_*_*_*_*_*_*_*_*_*_*_*_*_*_*_*_*_*_*_*_*_*_*_*_*_*_*    Assessment: 80 y  o female post operative day 2 right femur IMN   Pt doing well    Plan:  · Up and out of bed  · Weightbearing as tolerated  · PT/OT  · DVT prophylaxis - lovenox  · Analgesics  · Dispo: Ortho will follow  · Patient noted to have acute blood loss anemia due to a drop in Hbg of > 2 0g from preop levels, will monitor vital signs and resuscitate with IV fluids as needed    Chloé Bedolla

## 2018-07-20 NOTE — PROGRESS NOTES
IM Residency Progress Note   Unit/Bed#: CW3 340-01 Encounter: [id number]  SOD Team B       Angélica John 80 y o  female [id number]    Hospital Stay Days: 2      Assessment/Plan:    Principal Problem:    Closed fracture of right hip [facility])  Active Problems:    Alzheimers disease    Hypercholesterolemia    Hypertension    Type 2 diabetes mellitus ([facility] [address] )    Closed Fracture of right hip  · POD 2 right femoral intramedullary nail  Pt tolerated procedure well  · Management per ortho  · Pain management per ortho    Type 2 DM  · Last A1C 8 1%  Blood sugar improving  140's yesterday afternoon and 173 this morning  · Continue humalog 10 u TID with meals  · Lantus 26 units at bedtime  · Correctional algorithm insulin with meals and at bedtime  · Recommend resuming home insulin regimen on discharge    HTN  · Blood pressure trending down  Now between 140's and 160s since yesterday  · Home amlodipine and norvasc daily  No adjustment in medications at this time  · Continue to monitor clinically  Acute anemia  · Likely blood loss from orthopedic surgery  · Per Ortho, continue lactated Ringer's infusion and monitor blood count  Alzheimer's dementia  · Re-orient patient as necessary  · Caution with narcotics given risk of delirium     Depression  · Continue celexa and ritalin    Hx of pituitary tumor  · Will resume bromocriptine today, daily at bedtime  Disposition: per orthopedic team   No further intervention per internal medicine team   Will sign off  Please call with any questions  Subjective:   Patient seen and examined this morning  Upon arrival, patient was sleeping  Lethargic and somewhat difficulty to obtain a history out of  However, she is in no acute distress and not offering any complaints  Was noted to have right hip tenderness on exam   Vital stable this morning  Blood sugar 173 in the morning         Vitals: Temp (24hrs), Av 1 °F (36 7 °C), Min:97 8 °F (36 6 °C), Max:98 3 °F (36 8 °C)  Current: Temperature: 98 3 °F (36 8 °C)  Vitals:    07/19/18 2105 07/19/18 2328 07/20/18 0256 07/20/18 0652   BP:  141/65  163/71   BP Location:  Right arm  Right arm   Pulse:  81  72   Resp:  18  18   Temp:  98 °F (36 7 °C)  98 3 °F (36 8 °C)   TempSrc:  Oral  Oral   SpO2: 93% 96% 96% 96%   Weight:       Height:        Body mass index is 30 3 kg/m²  I/O last 24 hours: In: -   Out: 1834 [GXJDE:[id number]]      Physical Exam:  Physical Exam   Constitutional: She appears well-developed and well-nourished  She appears lethargic  No distress  Obese  female   HENT:   Head: Normocephalic and atraumatic  Eyes: EOM are normal  Pupils are equal, round, and reactive to light  Cardiovascular: Normal rate and regular rhythm  Murmur heard  Systolic murmur is present   Pulmonary/Chest: Effort normal and breath sounds normal  No respiratory distress  Musculoskeletal:        Right hip: She exhibits tenderness  Left hip: She exhibits no tenderness  Neurological: She appears lethargic  Skin: Skin is warm and dry  She is not diaphoretic  Invasive Devices     Peripheral Intravenous Line            Peripheral IV 07/18/18 Left Forearm 2 days                   Labs:   Recent Results (from the past 24 hour(s))   Fingerstick Glucose (POCT)    Collection Time: 07/19/18 11:36 AM   Result Value Ref Range    POC Glucose 148 (H) 65 - 140 mg/dl   Fingerstick Glucose (POCT)    Collection Time: 07/19/18  4:41 PM   Result Value Ref Range    POC Glucose 142 (H) 65 - 140 mg/dl   Fingerstick Glucose (POCT)    Collection Time: 07/19/18  9:06 PM   Result Value Ref Range    POC Glucose 207 (H) 65 - 140 mg/dl   Fingerstick Glucose (POCT)    Collection Time: 07/20/18  7:52 AM   Result Value Ref Range    POC Glucose 173 (H) 65 - 140 mg/dl       Radiology Results: I have personally reviewed pertinent reports  Other Diagnostic Testing:   I have personally reviewed pertinent reports          Active Meds: Current Facility-Administered Medications   Medication Dose Route Frequency    acetaminophen (TYLENOL) tablet 975 mg  975 mg Oral Q8H [facility]    amLODIPine (NORVASC) tablet 10 mg  10 mg Oral Daily    calcium carbonate (TUMS) chewable tablet 1,000 mg  1,000 mg Oral Daily PRN    citalopram (CeleXA) tablet 10 mg  10 mg Oral Daily    docusate sodium (COLACE) capsule 100 mg  100 mg Oral BID    enoxaparin (LOVENOX) subcutaneous injection 40 mg  40 mg Subcutaneous Q24H [facility]    gabapentin (NEURONTIN) capsule 100 mg  100 mg Oral HS    insulin glargine (LANTUS) subcutaneous injection 26 Units 0 26 mL  26 Units Subcutaneous HS    insulin lispro (HumaLOG) 100 units/mL subcutaneous injection 1-5 Units  1-5 Units Subcutaneous TID AC    insulin lispro (HumaLOG) 100 units/mL subcutaneous injection 1-5 Units  1-5 Units Subcutaneous HS    insulin lispro (HumaLOG) 100 units/mL subcutaneous injection 10 Units  10 Units Subcutaneous TID With Meals    lactated ringers infusion  75 mL/hr Intravenous Continuous    lidocaine (LIDODERM) 5 % patch 1 patch  1 patch Transdermal Daily    methocarbamol (ROBAXIN) tablet 500 mg  500 mg Oral Q6H PRN    methylphenidate (RITALIN) tablet 5 mg  5 mg Oral Daily    oxyCODONE (ROXICODONE) IR tablet 2 5 mg  2 5 mg Oral Q4H PRN    oxyCODONE (ROXICODONE) IR tablet 5 mg  5 mg Oral Q4H PRN    senna-docusate sodium (SENOKOT S) 8 6-50 mg per tablet 2 tablet  2 tablet Oral Daily    valsartan (DIOVAN) tablet 80 mg  80 mg Oral BID         VTE Pharmacologic Prophylaxis: Enoxaparin (Lovenox)  VTE Mechanical Prophylaxis: sequential compression device    Stephen Parnell DO

## 2018-07-20 NOTE — ED PROVIDER NOTES
Pt Name: Tiffani Colon  MRN: [id number]  [place] 9/22/1930  Age/Sex: 80 y o  female  Date of evaluation: 7/20/2018  PCP: Radha King MD    [address] [place] [address]    Chief Complaint   Patient presents with    CVA/TIA-like Symptoms     Dr Carmen Bahena from [address] [place] [address] concerned of R sided weakeness on pt while doing physician assessment         MONTY Yee presents to the Emergency Department from [facility]  She was DC from [facility] to rehab after a right hip surgery  She was evaluated by physician at rehab that felt she had RUE weakness that was new and she should likely be evaluated for CVA  HPI      Past Medical and Surgical History    Past Medical History:   Diagnosis Date    Ankle fracture     Dementia     Diabetes ([facility] [address] )     Hypertension     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[facility] [address] )     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HCC)        Past Surgical History:   Procedure Laterality Date    CARPAL TUNNEL RELEASE      CA OPEN RX FEMUR FX+INTRAMED RADHA Right 7/18/2018    Procedure: INSERTION NAIL IM FEMUR ANTEGRADE (TROCHANTERIC); Surgeon: Kirk Jefferson MD;  Location: [facility];  Service: Orthopedics       History reviewed  No pertinent family history  Social History   Substance Use Topics    Smoking status: Never Smoker    Smokeless tobacco: Never Used    Alcohol use No              Allergies    Allergies   Allergen Reactions    Amoxicillin     Ibuprofen        Home Medications    Prior to Admission medications    Medication Sig Start Date End Date Taking?  Authorizing Provider   amLODIPine (NORVASC) 5 mg tablet Take 10 mg by mouth daily      Historical Provider, MD   bromocriptine (PARLODEL) 2 5 mg tablet TAKE 1/4 TABLET BY MOUTH AT BEDTIME     Historical Provider, MD   Cholecalciferol (VITAMIN D3) 29357 units TABS Take by mouth daily    Historical Provider, MD   citalopram (CeleXA) 10 mg tablet TAKE 1/2 TABLET BY MOUTH DAILY     Historical Provider, MD   cyanocobalamin (VITAMIN B-12) 1,000 mcg tablet Take by mouth daily    Historical Provider, MD   docusate sodium (COLACE) 100 mg capsule Take 1 capsule (100 mg total) by mouth 2 (two) times a day 7/18/18   Jeffrey Urbina PA-C   enoxaparin (LOVENOX) 40 mg/0 4 mL Inject 0 4 mL (40 mg total) under the skin daily for 30 days 7/18/18 8/17/18  Jeffrey Urbina PA-C   gabapentin (NEURONTIN) 100 mg capsule Take 100 mg by mouth daily at bedtime    Historical Provider, MD SHYANNE CAMPOS injection pen 100 units/mL 26 Units 2 (two) times a day INJECT 28 UNITS SUBCU  3/5/18   Historical Provider, MD   lidocaine (LIDODERM) 5 % Place 1 patch on the skin daily Remove & Discard patch within 12 hours or as directed by MD    Historical Provider, MD   metFORMIN (GLUCOPHAGE) 1000 MG tablet Take 1 tablet by mouth daily    Historical Provider, MD   methylphenidate (RITALIN) 10 mg tablet TAKE 1/2 TAB DAILY IN EARLY AM  2/15/18   Historical Provider, MD Del Toro Meiers 30G X 8 MM [address] [place] [address]  3/23/18   Historical Provider, MD   senna-docusate sodium (SENOKOT S) 8 6-50 mg per tablet Take 2 tablets by mouth daily 7/20/18   Demetrice Jha MD   valsartan (DIOVAN) 160 mg tablet Give one tablet daily  5/10/18   Historical Provider, MD   acetaminophen (TYLENOL) 325 mg tablet 325 mg Take two tablet by mouth at 6:30am and 6:30pm  7/20/18  Historical Provider, MD   acetaminophen (TYLENOL) 325 mg tablet Please take 650 mg Q6hrs PRN for pain 7/19/18 7/20/18  Demetrice Jha MD   acetaminophen (TYLENOL) 500 mg tablet TAKE TWO TABLETS BY MOUTH AT 6:30AM AND 6:30PM  8/15/17 7/20/18  Historical Provider, MD   oxyCODONE (ROXICODONE) 5 mg immediate release tablet Take 1 tablets PO q 4 hours PRN Pain 7/18/18 7/20/18  Jeffrey Urbina PA-C           Review of Systems    Review of Systems   Unable to perform ROS: Mental status change           All other systems reviewed and negative      Physical Exam      ED Triage Vitals   Temp Pulse Respirations Blood Pressure SpO2   -- 07/20/18 1827 07/20/18 1827 07/20/18 1827 07/20/18 1827    81 18 (!) 189/77 95 %      Temp src Heart Rate Source Patient Position - Orthostatic VS BP Location FiO2 (%)   -- 07/20/18 2246 07/20/18 1827 07/20/18 1827 --    Monitor Lying Left arm       Pain Score       --                      Physical Exam   Constitutional: She appears well-developed and well-nourished  She appears lethargic  No distress  HENT:   Head: Normocephalic and atraumatic  Nose: Nose normal    Mouth/Throat: Oropharynx is clear and moist    Eyes: Conjunctivae, EOM and lids are normal  Pupils are equal, round, and reactive to light  Neck: Normal range of motion  Neck supple  Cardiovascular: Normal rate, regular rhythm and normal heart sounds  Exam reveals no gallop and no friction rub  No murmur heard  Pulmonary/Chest: Effort normal and breath sounds normal  No accessory muscle usage  No respiratory distress  She has no wheezes  She has no rales  Abdominal: Soft  She exhibits no distension  There is no tenderness  There is no rebound and no guarding  Musculoskeletal:        Right shoulder: She exhibits decreased range of motion and decreased strength  Right hip: She exhibits decreased range of motion, decreased strength and tenderness  Legs:  Neurological: She appears lethargic  A cranial nerve deficit is present  No sensory deficit  She exhibits abnormal muscle tone  RUE weakness  Right sided facial droop     Skin: Skin is warm and dry  No rash noted  She is not diaphoretic  No erythema  Psychiatric: She has a normal mood and affect  Her speech is normal and behavior is normal  Judgment and thought content normal    Nursing note and vitals reviewed  Assessment and Plan    Marisela Nixon is a 80 y o  female who presents with new right UE weakness compared with left  Physical examination remarkable for inability to raise RUE and slight right facial droop although subtle   Differential diagnosis (not completely inclusive) includes DDX includes but is not limited to: Dehydration, electrolyte abnormality, deconditioning, CVA/TIA, infection, anemia, thyroid disorder, cardiac disorder, dysrhythmia, medication effect  Plan will be to perform diagnostic testing and treat symptomatically  MDM    Diagnostic Results    EKG:  normal EKG, normal sinus rhythm    EKG INTERPRETATION  EKG Interpretation    EKG interpreted by me  Interpretation by Jojo Taylor DO  EKG reviewed and interpreted independently      Labs:    Results for orders placed or performed during the hospital encounter of 07/20/18   CBC and differential   Result Value Ref Range    WBC 11 30 (H) 4 31 - 10 16 Thousand/uL    RBC 3 74 (L) 4 00 - 5 20 Million/uL    Hemoglobin 11 0 (L) 12 0 - 16 0 g/dL    Hematocrit 33 1 (L) 36 0 - 46 0 %    MCV 89 82 - 98 fL    MCH 29 5 26 8 - 34 3 pg    MCHC 33 3 31 4 - 37 4 g/dL    RDW 15 0 <15 3 %    MPV 7 2 (L) 8 9 - 12 7 fL    Platelets 482 441 - 173 Thousands/uL   Comprehensive metabolic panel   Result Value Ref Range    Sodium 138 137 - 147 mmol/L    Potassium 4 3 3 6 - 5 0 mmol/L    Chloride 104 97 - 108 mmol/L    CO2 27 22 - 30 mmol/L    Anion Gap 7 5 - 14 mmol/L    BUN 19 5 - 25 mg/dL    Creatinine 0 48 (L) 0 60 - 1 20 mg/dL    Glucose 141 (H) 70 - 99 mg/dL    Calcium 10 5 (H) 8 4 - 10 2 mg/dL    AST 55 (H) 14 - 36 U/L    ALT 36 9 - 52 U/L    Alkaline Phosphatase 76 43 - 122 U/L    Total Protein 7 1 5 9 - 8 4 g/dL    Albumin 3 7 3 0 - 5 2 g/dL    Total Bilirubin 0 70 <1 30 mg/dL    eGFR 88 >60 ml/min/1 73sq m   UA w Reflex to Microscopic w Reflex to Culture   Result Value Ref Range    Color, UA Alice (A) Straw, Yellow, Pale Yellow    Clarity, UA Clear Clear, Other    Specific Gravity, UA 1 020 1 003 - 1 040    pH, UA 6 0 4 5 - 8 0    Leukocytes, UA Negative Negative    Nitrite, UA Negative Negative    Protein, UA Negative Negative mg/dl    Glucose, UA Negative Negative mg/dl    Ketones, UA 5 (Trace) (A) Negative mg/dl Bilirubin, UA Negative Negative    Blood, UA Negative Negative    UROBILINOGEN UA 1 0 1 0, Negative mg/dL   Troponin I   Result Value Ref Range    Troponin I <0 01 0 00 - 0 03 ng/mL   Grey top tube on hold   Result Value Ref Range    Extra Tube Hold for add-ons  Manual Differential (Non Wam)   Result Value Ref Range    Segmented % 61 45 - 65 %    Bands % 15 (H) 0 - 8 %    Lymphocytes % 15 (L) 20 - 50 %    Monocytes % 3 1 - 10 %    Eosinophils % 3 0 - 6 %    Basophils % 1 0 - 1 %    Myelocytes % 1 0 - 1 %    Atypical Lymphocytes % 1 (H) 0 - 0 %    Neutrophils Absolute 8 59 (H) 1 80 - 7 80 Thousand/uL    Lymphocytes Absolute 1 70 0 50 - 4 00 Thousand/uL    Monocytes Absolute 0 34 0 20 - 0 90 Thousand/uL    Eosinophils Absolute 0 34 0 00 - 0 40 Thousand/uL    Basophils Absolute 0 11 (H) 0 00 - 0 10 Thousand/uL    Total Counted 100     RBC Morphology abnormal     Hypochromia Present     Platelet Estimate Adequate Adequate       All labs reviewed and utilized in the medical decision making process    Radiology:    CT head without contrast   Final Result      Subacute left basal ganglia lacunar infarct new since the prior study from 3 days earlier  The study was marked in [facility] for immediate notification  Workstation performed: [id number]         XR chest 1 view   Final Result      Suspect mild cardiomegaly with mild pulmonary vascular congestion  No definite new consolidation to suggest pneumonia  5 mm right lower lobe lung nodule for which nonemergent outpatient unenhanced CT chest recommended           Workstation performed: [id number]             All radiology studies independently viewed by me and interpreted by the radiologist     Procedure    Procedures    CritCare Time      ED Course of Care and Re-Assessments        Medications   amLODIPine (NORVASC) tablet 10 mg (not administered)   bromocriptine (PARLODEL) tablet 2 5 mg (not administered)   Vitamin D3 TABS (not administered) citalopram (CeleXA) tablet 10 mg (not administered)   cyanocobalamin (VITAMIN B-12) tablet 1,000 mcg (not administered)   enoxaparin (LOVENOX) subcutaneous injection 40 mg (not administered)   gabapentin (NEURONTIN) capsule 100 mg (not administered)   lidocaine (LIDODERM) 5 % patch 1 patch (not administered)   methylphenidate (RITALIN) tablet 10 mg (not administered)   oxyCODONE (ROXICODONE) IR tablet 2 5 mg (not administered)   HYDROmorphone (DILAUDID) injection 0 2 mg (not administered)   morphine injection 1 mg (not administered)   labetalol (NORMODYNE) injection 10 mg (not administered)   fentanyl citrate (PF) 100 MCG/2ML 25 mcg (25 mcg Intravenous Given 7/20/18 2203)           FINAL IMPRESSION    Final diagnoses:   CVA (cerebral vascular accident) [facility])         DISPOSITION/PLAN    Time reflects when diagnosis was documented in both MDM as applicable and the Disposition within this note     Time User Action Codes Description Comment    7/20/2018  9:28 PM Leonel Dickinson Add [I63 9] CVA (cerebral vascular accident) ([facility] [address] )     7/20/2018  9:51 PM Claude Sanchez Add [R26 9] Abnormal gait       ED Disposition     ED Disposition Condition Comment    Admit  Case was discussed with RUTH and the patient's admission status was agreed to be Admission Status: inpatient status to the service of Dr Samra Leal   Follow-up Information    None           PATIENT REFERRED TO:    No follow-up provider specified      DISCHARGE MEDICATIONS:    Current Discharge Medication List      CONTINUE these medications which have NOT CHANGED    Details   amLODIPine (NORVASC) 5 mg tablet Take 10 mg by mouth daily        bromocriptine (PARLODEL) 2 5 mg tablet TAKE 1/4 TABLET BY MOUTH AT BEDTIME       Cholecalciferol (VITAMIN D3) 43220 units TABS Take by mouth daily      citalopram (CeleXA) 10 mg tablet TAKE 1/2 TABLET BY MOUTH DAILY       cyanocobalamin (VITAMIN B-12) 1,000 mcg tablet Take by mouth daily      docusate sodium (COLACE) 100 mg capsule Take 1 capsule (100 mg total) by mouth 2 (two) times a day  Qty: 10 capsule, Refills: 0    Associated Diagnoses: Aftercare following surgery for injury or trauma      enoxaparin (LOVENOX) 40 mg/0 4 mL Inject 0 4 mL (40 mg total) under the skin daily for 30 days  Qty: 30 Syringe, Refills: 0    Associated Diagnoses: Aftercare following surgery for injury or trauma      gabapentin (NEURONTIN) 100 mg capsule Take 100 mg by mouth daily at bedtime      LANTUS SOLOSTAR injection pen 100 units/mL 26 Units 2 (two) times a day INJECT 28 UNITS SUBCU       lidocaine (LIDODERM) 5 % Place 1 patch on the skin daily Remove & Discard patch within 12 hours or as directed by MD      metFORMIN (GLUCOPHAGE) 1000 MG tablet Take 1 tablet by mouth daily      methylphenidate (RITALIN) 10 mg tablet TAKE 1/2 TAB DAILY IN EARLY AM       NOVOFINE AUTOCOVER 30G X 8 MM MISC       senna-docusate sodium (SENOKOT S) 8 6-50 mg per tablet Take 2 tablets by mouth daily  Refills: 0    Associated Diagnoses: Closed fracture of right hip, initial encounter ([facility] [address] ); Aftercare following surgery for injury or trauma      valsartan (DIOVAN) 160 mg tablet Give one tablet daily              No discharge procedures on file           Juvenal Maurice, [address] [place] [address],   07/20/18 8964

## 2018-07-21 ENCOUNTER — APPOINTMENT (INPATIENT)
Dept: NON INVASIVE DIAGNOSTICS | Facility: HOSPITAL | Age: 83
DRG: 064 | End: 2018-07-21
Payer: MEDICARE

## 2018-07-21 ENCOUNTER — APPOINTMENT (INPATIENT)
Dept: MRI IMAGING | Facility: HOSPITAL | Age: 83
DRG: 064 | End: 2018-07-21
Payer: MEDICARE

## 2018-07-21 PROBLEM — D64.9 ANEMIA: Status: ACTIVE | Noted: 2018-07-21

## 2018-07-21 PROBLEM — D72.829 LEUKOCYTOSIS: Status: ACTIVE | Noted: 2018-07-21

## 2018-07-21 PROBLEM — E83.52 HYPERCALCEMIA: Chronic | Status: RESOLVED | Noted: 2018-07-20 | Resolved: 2018-07-21

## 2018-07-21 PROBLEM — D72.829 LEUKOCYTOSIS: Status: RESOLVED | Noted: 2018-07-21 | Resolved: 2018-07-21

## 2018-07-21 PROBLEM — R06.02 SHORTNESS OF BREATH: Status: ACTIVE | Noted: 2018-07-21

## 2018-07-21 LAB
ANION GAP SERPL CALCULATED.3IONS-SCNC: 8 MMOL/L (ref 5–14)
APTT PPP: 29 SECONDS (ref 23–34)
ATRIAL RATE: 83 BPM
ATRIAL RATE: 84 BPM
BASOPHILS # BLD AUTO: 0.1 THOUSANDS/ΜL (ref 0–0.1)
BASOPHILS NFR BLD AUTO: 1 % (ref 0–1)
BUN SERPL-MCNC: 17 MG/DL (ref 5–25)
CALCIUM SERPL-MCNC: 10.2 MG/DL (ref 8.4–10.2)
CHLORIDE SERPL-SCNC: 102 MMOL/L (ref 97–108)
CHOLEST SERPL-MCNC: 153 MG/DL
CK SERPL-CCNC: 51 U/L (ref 30–135)
CK SERPL-CCNC: 57 U/L (ref 30–135)
CK SERPL-CCNC: 57 U/L (ref 30–135)
CO2 SERPL-SCNC: 28 MMOL/L (ref 22–30)
CREAT SERPL-MCNC: 0.5 MG/DL (ref 0.6–1.2)
CRP SERPL HS-MCNC: 56.6 MG/L
CRP SERPL QL: 59 MG/L
EOSINOPHIL # BLD AUTO: 0.4 THOUSAND/ΜL (ref 0–0.4)
EOSINOPHIL NFR BLD AUTO: 5 % (ref 0–6)
ERYTHROCYTE [DISTWIDTH] IN BLOOD BY AUTOMATED COUNT: 15.2 %
GFR SERPL CREATININE-BSD FRML MDRD: 87 ML/MIN/1.73SQ M
GLUCOSE SERPL-MCNC: 131 MG/DL (ref 70–99)
GLUCOSE SERPL-MCNC: 147 MG/DL (ref 70–99)
GLUCOSE SERPL-MCNC: 152 MG/DL (ref 70–99)
GLUCOSE SERPL-MCNC: 152 MG/DL (ref 70–99)
GLUCOSE SERPL-MCNC: 200 MG/DL (ref 70–99)
HCT VFR BLD AUTO: 29.6 % (ref 36–46)
HCYS SERPL-SCNC: 10.3 UMOL/L (ref 3.7–11.2)
HDLC SERPL-MCNC: 36 MG/DL (ref 40–59)
HGB BLD-MCNC: 10 G/DL (ref 12–16)
INR PPP: 1.03 (ref 0.89–1.1)
LDLC SERPL CALC-MCNC: 82 MG/DL
LYMPHOCYTES # BLD AUTO: 2.3 THOUSANDS/ΜL (ref 0.5–4)
LYMPHOCYTES NFR BLD AUTO: 24 % (ref 20–50)
MAGNESIUM SERPL-MCNC: 1.5 MG/DL (ref 1.6–2.3)
MCH RBC QN AUTO: 29.9 PG (ref 26.8–34.3)
MCHC RBC AUTO-ENTMCNC: 33.6 G/DL (ref 31.4–37.4)
MCV RBC AUTO: 89 FL (ref 82–98)
MONOCYTES # BLD AUTO: 0.7 THOUSAND/ΜL (ref 0.2–0.9)
MONOCYTES NFR BLD AUTO: 7 % (ref 1–10)
NEUTROPHILS # BLD AUTO: 5.9 THOUSANDS/ΜL (ref 1.8–7.8)
NEUTS SEG NFR BLD AUTO: 63 % (ref 45–65)
P AXIS: 1 DEGREES
P AXIS: 56 DEGREES
PLATELET # BLD AUTO: 197 THOUSANDS/UL (ref 150–450)
PMV BLD AUTO: 7.4 FL (ref 8.9–12.7)
POTASSIUM SERPL-SCNC: 4 MMOL/L (ref 3.6–5)
PR INTERVAL: 128 MS
PR INTERVAL: 136 MS
PROTHROMBIN TIME: 10.9 SECONDS (ref 9.5–11.6)
QRS AXIS: 15 DEGREES
QRS AXIS: 5 DEGREES
QRSD INTERVAL: 66 MS
QRSD INTERVAL: 70 MS
QT INTERVAL: 366 MS
QT INTERVAL: 380 MS
QTC INTERVAL: 430 MS
QTC INTERVAL: 449 MS
RBC # BLD AUTO: 3.33 MILLION/UL (ref 4–5.2)
SODIUM SERPL-SCNC: 138 MMOL/L (ref 137–147)
T WAVE AXIS: 70 DEGREES
T WAVE AXIS: 82 DEGREES
TRIGL SERPL-MCNC: 173 MG/DL
TROPONIN I SERPL-MCNC: <0.01 NG/ML (ref 0–0.03)
TSH SERPL DL<=0.05 MIU/L-ACNC: 1.37 UIU/ML (ref 0.47–4.68)
VENTRICULAR RATE: 83 BPM
VENTRICULAR RATE: 84 BPM
WBC # BLD AUTO: 9.4 THOUSAND/UL (ref 4.31–10.16)

## 2018-07-21 PROCEDURE — 70547 MR ANGIOGRAPHY NECK W/O DYE: CPT

## 2018-07-21 PROCEDURE — 93970 EXTREMITY STUDY: CPT | Performed by: SURGERY

## 2018-07-21 PROCEDURE — 93010 ELECTROCARDIOGRAM REPORT: CPT | Performed by: INTERNAL MEDICINE

## 2018-07-21 PROCEDURE — 83735 ASSAY OF MAGNESIUM: CPT | Performed by: HOSPITALIST

## 2018-07-21 PROCEDURE — 85730 THROMBOPLASTIN TIME PARTIAL: CPT | Performed by: HOSPITALIST

## 2018-07-21 PROCEDURE — 93880 EXTRACRANIAL BILAT STUDY: CPT

## 2018-07-21 PROCEDURE — 93970 EXTREMITY STUDY: CPT

## 2018-07-21 PROCEDURE — 86140 C-REACTIVE PROTEIN: CPT | Performed by: HOSPITALIST

## 2018-07-21 PROCEDURE — 93005 ELECTROCARDIOGRAM TRACING: CPT

## 2018-07-21 PROCEDURE — 84443 ASSAY THYROID STIM HORMONE: CPT | Performed by: HOSPITALIST

## 2018-07-21 PROCEDURE — 83090 ASSAY OF HOMOCYSTEINE: CPT | Performed by: HOSPITALIST

## 2018-07-21 PROCEDURE — 80061 LIPID PANEL: CPT | Performed by: HOSPITALIST

## 2018-07-21 PROCEDURE — 82948 REAGENT STRIP/BLOOD GLUCOSE: CPT

## 2018-07-21 PROCEDURE — 82550 ASSAY OF CK (CPK): CPT | Performed by: FAMILY MEDICINE

## 2018-07-21 PROCEDURE — 86141 C-REACTIVE PROTEIN HS: CPT | Performed by: HOSPITALIST

## 2018-07-21 PROCEDURE — 99233 SBSQ HOSP IP/OBS HIGH 50: CPT | Performed by: FAMILY MEDICINE

## 2018-07-21 PROCEDURE — 99232 SBSQ HOSP IP/OBS MODERATE 35: CPT | Performed by: PSYCHIATRY & NEUROLOGY

## 2018-07-21 PROCEDURE — 84484 ASSAY OF TROPONIN QUANT: CPT | Performed by: FAMILY MEDICINE

## 2018-07-21 PROCEDURE — 70544 MR ANGIOGRAPHY HEAD W/O DYE: CPT

## 2018-07-21 PROCEDURE — 80048 BASIC METABOLIC PNL TOTAL CA: CPT | Performed by: HOSPITALIST

## 2018-07-21 PROCEDURE — 85610 PROTHROMBIN TIME: CPT | Performed by: HOSPITALIST

## 2018-07-21 PROCEDURE — 93880 EXTRACRANIAL BILAT STUDY: CPT | Performed by: SURGERY

## 2018-07-21 PROCEDURE — 85025 COMPLETE CBC W/AUTO DIFF WBC: CPT | Performed by: HOSPITALIST

## 2018-07-21 PROCEDURE — 70551 MRI BRAIN STEM W/O DYE: CPT

## 2018-07-21 RX ORDER — FUROSEMIDE 10 MG/ML
20 INJECTION INTRAMUSCULAR; INTRAVENOUS ONCE
Status: COMPLETED | OUTPATIENT
Start: 2018-07-21 | End: 2018-07-21

## 2018-07-21 RX ORDER — CLOPIDOGREL BISULFATE 75 MG/1
75 TABLET ORAL DAILY
Status: DISCONTINUED | OUTPATIENT
Start: 2018-07-21 | End: 2018-07-24 | Stop reason: HOSPADM

## 2018-07-21 RX ORDER — MAGNESIUM SULFATE HEPTAHYDRATE 40 MG/ML
2 INJECTION, SOLUTION INTRAVENOUS ONCE
Status: COMPLETED | OUTPATIENT
Start: 2018-07-21 | End: 2018-07-21

## 2018-07-21 RX ORDER — FERROUS SULFATE 325(65) MG
325 TABLET ORAL
Status: DISCONTINUED | OUTPATIENT
Start: 2018-07-21 | End: 2018-07-24 | Stop reason: HOSPADM

## 2018-07-21 RX ORDER — MORPHINE SULFATE 2 MG/ML
0.5 INJECTION, SOLUTION INTRAMUSCULAR; INTRAVENOUS EVERY 4 HOURS PRN
Status: DISCONTINUED | OUTPATIENT
Start: 2018-07-21 | End: 2018-07-24 | Stop reason: HOSPADM

## 2018-07-21 RX ORDER — AMLODIPINE BESYLATE 10 MG/1
10 TABLET ORAL DAILY
Status: DISCONTINUED | OUTPATIENT
Start: 2018-07-21 | End: 2018-07-22

## 2018-07-21 RX ADMIN — INSULIN LISPRO 1 UNITS: 100 INJECTION, SOLUTION INTRAVENOUS; SUBCUTANEOUS at 16:29

## 2018-07-21 RX ADMIN — MAGNESIUM SULFATE HEPTAHYDRATE 2 G: 40 INJECTION, SOLUTION INTRAVENOUS at 12:52

## 2018-07-21 RX ADMIN — INSULIN LISPRO 1 UNITS: 100 INJECTION, SOLUTION INTRAVENOUS; SUBCUTANEOUS at 01:02

## 2018-07-21 RX ADMIN — ATORVASTATIN CALCIUM 40 MG: 40 TABLET, FILM COATED ORAL at 17:12

## 2018-07-21 RX ADMIN — CLOPIDOGREL 75 MG: 75 TABLET, FILM COATED ORAL at 13:22

## 2018-07-21 RX ADMIN — GABAPENTIN 100 MG: 100 CAPSULE ORAL at 21:46

## 2018-07-21 RX ADMIN — LIDOCAINE 1 PATCH: 50 PATCH CUTANEOUS at 12:53

## 2018-07-21 RX ADMIN — MORPHINE SULFATE 1 MG: 2 INJECTION, SOLUTION INTRAMUSCULAR; INTRAVENOUS at 10:13

## 2018-07-21 RX ADMIN — AMLODIPINE BESYLATE 10 MG: 10 TABLET ORAL at 12:54

## 2018-07-21 RX ADMIN — BROMOCRIPTINE MESYLATE 2.5 MG: 2.5 TABLET ORAL at 13:22

## 2018-07-21 RX ADMIN — OXYCODONE HYDROCHLORIDE 2.5 MG: 5 TABLET ORAL at 05:48

## 2018-07-21 RX ADMIN — CYANOCOBALAMIN TAB 1000 MCG 1000 MCG: 1000 TAB at 12:56

## 2018-07-21 RX ADMIN — FUROSEMIDE 20 MG: 10 INJECTION, SOLUTION INTRAMUSCULAR; INTRAVENOUS at 11:58

## 2018-07-21 RX ADMIN — SENNOSIDES 8.6 MG: 8.6 TABLET, FILM COATED ORAL at 12:56

## 2018-07-21 RX ADMIN — OXYCODONE HYDROCHLORIDE 2.5 MG: 5 TABLET ORAL at 21:45

## 2018-07-21 RX ADMIN — CITALOPRAM HYDROBROMIDE 10 MG: 10 TABLET ORAL at 12:56

## 2018-07-21 RX ADMIN — DOCUSATE SODIUM 100 MG: 100 CAPSULE, LIQUID FILLED ORAL at 17:12

## 2018-07-21 RX ADMIN — DOCUSATE SODIUM 100 MG: 100 CAPSULE, LIQUID FILLED ORAL at 12:56

## 2018-07-21 RX ADMIN — ASPIRIN 81 MG 81 MG: 81 TABLET ORAL at 12:56

## 2018-07-21 RX ADMIN — FERROUS SULFATE TAB 325 MG (65 MG ELEMENTAL FE) 325 MG: 325 (65 FE) TAB at 12:55

## 2018-07-21 RX ADMIN — SODIUM CHLORIDE 75 ML/HR: 9 INJECTION, SOLUTION INTRAVENOUS at 01:02

## 2018-07-21 RX ADMIN — METHYLPHENIDATE HYDROCHLORIDE 10 MG: 10 TABLET ORAL at 12:56

## 2018-07-21 RX ADMIN — ENOXAPARIN SODIUM 40 MG: 100 INJECTION SUBCUTANEOUS at 11:58

## 2018-07-21 NOTE — NURSING NOTE
Pt arrived on the floor from ER at 2310  Pt transferred to tele bed  Placed on tele, SR  Pt is non verbal at this time  Pt is under the effect of pain medicine from ER  Pt opens her eyes to verbal stimuli  Son and daughter at bed side  VS stable  Incontinent  Rt Hip dressing with 4/4 C/D/I with a small blister at the outer edge of the Tegaderm  Rt hip slightly off  Bed alarm activated foe safety  Will continue to monitor   Call bell in reach/

## 2018-07-21 NOTE — ASSESSMENT & PLAN NOTE
Continue PT OT DVT prophylaxis  Patient will need to continue rehab after stabilization of her acute health issues

## 2018-07-21 NOTE — ASSESSMENT & PLAN NOTE
Lab Results   Component Value Date    HGBA1C 8 1 (H) 07/18/2018       Recent Labs      07/19/18   2106  07/20/18   0752  07/20/18   1016  07/20/18   1620   POCGLU  207*  173*  171*  117*       Blood Sugar Average: Last 72 hrs:   last known hemoglobin A1c 8 1 currently patient NPO   will continue gentle IV hydration ,Accu-Cheks, insulin sliding scale   hold oral hypoglycemic and Lantus

## 2018-07-21 NOTE — SOCIAL WORK
SW met with patient and patient's three children (daughter/POA Amber Burrell, son Dr Valenzuelaaure Karlos and other daughter)  Due to patient's hx of Alzheimer's, psychosocial was conducted with family  Amber Burrell reports that she is POA for healthcare and financial decisions  Amber Burrell reports patient is currently on comfort measures  Patient resides at [facility] living and was initially admitted s/p fall and ORIF  Patient was admitted [facility] per family's request  Amber Burrell reports patient was at [facility] for 2 hours when right-sided weakness was noted  Patient was found to have subacute stroke, neurology to evaluate patient  Per family, prior to patient's admission patient was at supervision level for care and required queuing for self-care tasks  Patient would be assisted in dining lange with getting in/out of chair  Patient's family reports patient owns a rollator (uses mostly), a RW and BSC  Patient's son Dr Leeanne Everett is patient's PCP  Family reports no mental health treatment other than patient's dementia history  Patient's pharmacy is [facility]  Patient's family reports that their preference would be for patient to return to [facility] when medically cleared  Family understands that patient will need to be reevaluated by PT/OT  No further questions/concerns at this time  SW will continue to follow-up with patient's family for discharge planning

## 2018-07-21 NOTE — ASSESSMENT & PLAN NOTE
Most likely reactive, secondary to recent surgery  UA negative, chest x-ray negative for infiltrates or atelectasis  Will encourage incentive spirometer   continue to monitor off antibiotic

## 2018-07-21 NOTE — CONSULTS
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  She has multiple vascular risk factors and had this event while she was not on aspirin or statin  The exact onset of her symptoms is unclear  She may have had a stroke during his stay at the hospital      2   Encephalopathy likely related to pain medications  I do not think that has the floor but easily to 2 subcortical stroke    3  Dementia without behavioral disturbances at 1 time she was on Aricept but she is no longer on it  4    Ambulatory dysfunction with history of fall is status post fracture of the femur requiring surgical intervention  4   Hypertension  I agree to keep the systolic blood pressure in the range of   140-150s  Plan:  1  Agree to use aspirin 81 mg a day, Lipitor 40 mg a day for stroke prevention  She is also on Lovenox 40 mg a day for DVT prophylaxis due to history of present her right femur fracture  I spoke with her son and the with add Plavix 75 mg a day for 3 weeks and then it will be discontinued  The risk of bleeding also reviewed  She has anemia which is likely related to recent surgery  If she further dropped hemoglobin then Plavix can be discontinued  2   She will be getting carotid duplex study and echocardiogram     3   MRI of the brain shows possible of right PCA stenosis which is asymptomatic  The MRI also shows evidence of motion artifact  I will continue to use low-dose aspirin and a statin Lipitor 40 mg a day  History of Present Illness     Reason for Consult / Principal Problem:  Stroke  Hx and PE limited by:  Dementia and prior to my examination she was given morphine for pain management  HPI: Gretta Shelton is a 80 y o  right-handed female who presents with left-sided weakness    The history is obtained from the medical record, her daughter Ashok Nissen, attending physician and nursing staff  Patient has history of dementia and resides at [facility]  She walks with the help of a walker and had a fall on July 17, 2018  She diagnosed to have intertrochanteric femur fracture requiring right femoral intramedullary nail on 7/18/18  According her daughter, she may have noticed some weakness of the right upper extremity on July 19, 2018 but she also under the effect of pain medications and there was some infiltration of the IV line  The exact onset of her symptoms is unclear  She was transferred to acute rehab unit on July 20, 2018 and found to have right upper extremity weakness  There was also concern about a right facial asymmetry  She was subsequently sent to the emergency department and had a CT scan of the head without contrast which shows presence of subacute left subcortical stroke and patient was admitted to medical floor for ongoing care  During his stay at the unit, she has been lethargic and has been receiving pain medications  A detailed history and review of systems cannot be obtained from the patient  Inpatient consult to Neurology  Consult performed by: Tianna Yanez ordered by: Elaine Arm          Review of Systems   Unable to perform ROS: Dementia       Historical Information   Past Medical History:   Diagnosis Date    Ankle fracture     Dementia     Diabetes [facility] [address] [facility]     Hypertension     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[facility] [address] [facility]     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[facility] [address] )      Past Surgical History:   Procedure Laterality Date    CARPAL TUNNEL RELEASE      MT OPEN RX FEMUR FX+INTRAMED RADHA Right 7/18/2018    Procedure: INSERTION NAIL IM FEMUR ANTEGRADE (TROCHANTERIC);   Surgeon: Thelma Lucas MD;  Location: [facility];  Service: Orthopedics     Social History   History   Alcohol Use No     History   Drug Use No     History   Smoking Status    Never Smoker Smokeless Tobacco    Never Used     Family History: non-contributory    Review of previous medical records was completed  Meds/Allergies   current meds:   Current Facility-Administered Medications   Medication Dose Route Frequency    acetaminophen (TYLENOL) rectal suppository 650 mg  650 mg Rectal Q6H PRN    amLODIPine (NORVASC) tablet 10 mg  10 mg Oral Daily    aspirin chewable tablet 81 mg  81 mg Oral Daily    atorvastatin (LIPITOR) tablet 40 mg  40 mg Oral QPM    bromocriptine (PARLODEL) tablet 2 5 mg  2 5 mg Oral Daily    citalopram (CeleXA) tablet 10 mg  10 mg Oral Daily    cyanocobalamin (VITAMIN B-12) tablet 1,000 mcg  1,000 mcg Oral Daily    docusate sodium (COLACE) capsule 100 mg  100 mg Oral BID    enoxaparin (LOVENOX) subcutaneous injection 40 mg  40 mg Subcutaneous Daily    ferrous sulfate tablet 325 mg  325 mg Oral Daily With Breakfast    furosemide (LASIX) injection 20 mg  20 mg Intravenous Once    gabapentin (NEURONTIN) capsule 100 mg  100 mg Oral HS    gadobenate dimeglumine (MULTIHANCE) injection 10 mL  10 mL Intravenous Once in imaging    HYDROmorphone (DILAUDID) injection 0 2 mg  0 2 mg Intravenous Q4H PRN    insulin lispro (HumaLOG) 100 units/mL subcutaneous injection 1-5 Units  1-5 Units Subcutaneous Q6H CRUZ    labetalol (NORMODYNE) injection 10 mg  10 mg Intravenous Q6H PRN    lidocaine (LIDODERM) 5 % patch 1 patch  1 patch Transdermal Daily    methylphenidate (RITALIN) tablet 10 mg  10 mg Oral BID before breakfast/lunch    morphine injection 1 mg  1 mg Intravenous Q4H PRN    ondansetron (ZOFRAN) injection 4 mg  4 mg Intravenous Q6H PRN    oxyCODONE (ROXICODONE) IR tablet 2 5 mg  2 5 mg Oral Q4H PRN    senna (SENOKOT) tablet 8 6 mg  1 tablet Oral Daily    and PTA meds:   Prior to Admission Medications   Prescriptions Last Dose Informant Patient Reported? Taking?    Cholecalciferol (VITAMIN D3) 35979 units TABS Unknown at Unknown time  Yes No   Sig: Take by mouth daily   LANTUS SOLOSTAR injection pen 100 units/mL Unknown at Unknown time  Yes No   Si Units 2 (two) times a day INJECT 28 UNITS SUBCU    NOVOFINE AUTOCOVER 30G X 8 MM MISC Unknown at Unknown time  Yes No   amLODIPine (NORVASC) 5 mg tablet Unknown at Unknown time  Yes No   Sig: Take 10 mg by mouth daily     bromocriptine (PARLODEL) 2 5 mg tablet Unknown at Unknown time  Yes No   Sig: TAKE 1/4 TABLET BY MOUTH AT BEDTIME    citalopram (CeleXA) 10 mg tablet Unknown at Unknown time  Yes No   Sig: TAKE 1/2 TABLET BY MOUTH DAILY    cyanocobalamin (VITAMIN B-12) 1,000 mcg tablet Unknown at Unknown time  Yes No   Sig: Take by mouth daily   docusate sodium (COLACE) 100 mg capsule Unknown at Unknown time  No No   Sig: Take 1 capsule (100 mg total) by mouth 2 (two) times a day   enoxaparin (LOVENOX) 40 mg/0 4 mL Unknown at Unknown time  No No   Sig: Inject 0 4 mL (40 mg total) under the skin daily for 30 days   gabapentin (NEURONTIN) 100 mg capsule Unknown at Unknown time  Yes No   Sig: Take 100 mg by mouth daily at bedtime   lidocaine (LIDODERM) 5 % Unknown at Unknown time  Yes No   Sig: Place 1 patch on the skin daily Remove & Discard patch within 12 hours or as directed by MD   metFORMIN (GLUCOPHAGE) 1000 MG tablet Unknown at Unknown time  Yes No   Sig: Take 1 tablet by mouth daily   methylphenidate (RITALIN) 10 mg tablet Unknown at Unknown time  Yes No   Sig: TAKE 1/2 TAB DAILY IN EARLY AM    senna-docusate sodium (SENOKOT S) 8 6-50 mg per tablet Unknown at Unknown time  No No   Sig: Take 2 tablets by mouth daily   valsartan (DIOVAN) 160 mg tablet Unknown at Unknown time  Yes No   Sig: Give one tablet daily       Facility-Administered Medications: None       Allergies   Allergen Reactions    Amoxicillin     Ibuprofen        Objective   Vitals:Blood pressure 148/82, pulse 89, temperature (!) 97 4 °F (36 3 °C), temperature source Temporal, resp   rate 18, height 4' 11" (1 499 m), weight 69 9 kg (154 lb 1 6 oz), SpO2 96 %, not currently breastfeeding  ,Body mass index is 31 12 kg/m²  Intake/Output Summary (Last 24 hours) at 07/21/18 1133  Last data filed at 07/21/18 0643   Gross per 24 hour   Intake           426 25 ml   Output                0 ml   Net           426 25 ml       Invasive Devices: Invasive Devices     Peripheral Intravenous Line            Peripheral IV 07/20/18 Right Hand less than 1 day                Physical Exam  Neurologic Exam  No carotid bruit      heart sounds unremarkable  Trace ankle edema seen    She found to be lethargic but arousable  Once awake, she was able to follow one-step command on inconsistent basis  She was able to say 1 or 2 words  She did not talk much  Her pupils were about 2 with 3 mm round and reactive to light  She can move eyes to cross midline  She found to have a right central type of facial weakness  Her tongue was midline  She was able to count fingers but it just takes long time for her to do so  Her motor examination shows slightly increased muscle tone in right upper extremity  He was unable to move right upper extremity against gravity but able to be her index finger  On left upper extremity, she was able to move it against gravity and squeeze my hand the motor stent is -4/5  On lower extremities, she was able to wiggle toes of left foot and tried to move the toes of right foot  She did not move her lower extremities against gravity  The plantar was silence  It is difficult to assess her sensory examination  A detailed physical and neurological examination cannot be performed  It is difficult to assess her NIH stroke scale due to lethargy and limited cooperation  Lab Results:   I have personally reviewed pertinent reports    , CBC:   Results from last 7 days  Lab Units 07/21/18  0435 07/20/18 2004 07/19/18  0732   WBC Thousand/uL 9 40 11 30* 8 56   RBC Million/uL 3 33* 3 74* 3 28*   HEMOGLOBIN g/dL 10 0* 11 0* 9 4*   HEMATOCRIT % 29 6* 33 1* 29 4*   MCV fL 89 89 90   PLATELETS Thousands/uL 197 212 151   , BMP/CMP:   Results from last 7 days  Lab Units 07/21/18 0435 07/20/18 2004 07/19/18 0438 07/17/18  1435   SODIUM mmol/L 138 138 135*  < > 134*   POTASSIUM mmol/L 4 0 4 3 4 5  < > 4 3   CHLORIDE mmol/L 102 104 105  < > 104   CO2 mmol/L 28 27 24  < > 24   ANION GAP mmol/L 8 7 6  < > 6   BUN mg/dL 17 19 19  < > 18   CREATININE mg/dL 0 50* 0 48* 0 70  < > 0 82   GLUCOSE RANDOM mg/dL 131* 141* 225*  < > 250*   CALCIUM mg/dL 10 2 10 5* 10 1  < > 11 3*   AST U/L  --  55*  --   --  28   ALT U/L  --  36  --   --  30   ALK PHOS U/L  --  76  --   --  93   TOTAL PROTEIN g/dL  --  7 1  --   --  7 4   BILIRUBIN TOTAL mg/dL  --  0 70  --   --  0 41   EGFR ml/min/1 73sq m 87 88 78  < > 65   < > = values in this interval not displayed  , Vitamin B12:   , HgBA1C:   Results from last 7 days  Lab Units 07/18/18  0559   HEMOGLOBIN A1C % 8 1*   , TSH:   Results from last 7 days  Lab Units 07/21/18  0435   TSH 3RD GENERATON uIU/mL 1 370     Imaging Studies: I have personally reviewed pertinent films in PACS  EKG, Pathology, and Other Studies: I have personally reviewed pertinent reports  EKG: NSR  INR: Unremarkable  MRI brain: A 7 mm mass in the posterior sella   Subacute nonhemorrhagic lacunar infarction involving left posterior periventricular white matter and left posterior putamen  MRA Brain:  Focal high-grade intracranial atherosclerotic stenosis at the junction of right P1 and P2 posterior cerebral artery segments  MRA:  Neck vessels was unremarkable      Anatomic variation including fetal origin of left posterior cerebral artery and markedly diminutive nondominant distal right vertebral artery  Code Status: Level 3 - DNAR and DNI  Advance Directive and Living Will:      Power of :    POLST:      Counseling / Coordination of Care  Total time spent today 60 minutes   Greater than 50% of total time was spent with the patient and / or family counseling and / or coordination of care  A description of the counseling / coordination of care: I spoke with the family members including stand, hospitalist, nursing staff, independently visualized the MRI film of the brain labs and radiology data

## 2018-07-21 NOTE — ASSESSMENT & PLAN NOTE
Patient has upcoming appointment with Endocrinology for MEN1  Calcium trending down with IV hydration , will continue IV fluids

## 2018-07-21 NOTE — ASSESSMENT & PLAN NOTE
Lab Results   Component Value Date    HGBA1C 8 1 (H) 07/18/2018       Recent Labs      07/20/18   0752  07/20/18   1016  07/20/18   1620  07/21/18   0548   POCGLU  173*  171*  117*  147*       Blood Sugar Average: Last 72 hrs:  (P) 147 last known hemoglobin A1c 8 1     Sugars controlled with just iss   Will start diet then eval if need to start basals

## 2018-07-21 NOTE — ASSESSMENT & PLAN NOTE
Continue home blood pressure Rx  Labetalol p r n  With subacute cva- we do not have to keep ,maps, dc iv fluids and observe bp- as elevated today in am ? Pain related as well- keep bp not lower then 140

## 2018-07-21 NOTE — ASSESSMENT & PLAN NOTE
Supportive care  Frequent reorientation  Avoid unnecessary sedatives benzodiazepines pain medication she just underwent orthopedic surgery,   Passed swallow eval - start diet mechanical soft thin liquid  [id number]

## 2018-07-21 NOTE — NURSING NOTE
Pt more awake  Sitting up in bed  Provided with lunch  Tolerating mechanical soft diet  All a m  meds provided now since, pt more awake and was off floor and testing at bedside  BP elevated otherwise VSS  Appears comfortable at rest, but grimaces in pain when moving lower extremities  o x 2  No change in neuro assessment  Right facial droop  R sided weakness  SR on monitor  Lungs clear  +BS  Abdomen softy distended  Incontinent of bladder  Dressing to right hip dry and intact  Call bell in reach  Family at bedside  Will continue to monitor

## 2018-07-21 NOTE — ASSESSMENT & PLAN NOTE
Patient was transferred to the ER  from  rehab on 7/21/18 after she found to have a right-sided weakness and right-sided facial droop ,I spoke to the patient's son,  Dr Dunia Ga, who can not identify exact onset of symptoms, however CT of the head obtained by ER attending report" Subacute left basal ganglia lacunar infarct new since the prior study from 3 days earlier"  According to the notes on July 17 patient was admitted to orthopedic service at [facility], status post fall and found to have a right hip fracture  CT of the head  on admission was negative for acute intracranial pathology  Patient successfully underwent  IM nail femur of intertrochanteric femur fracture on 7/18/18 by Inés Walker and transferred to  rehab on 7/20  Upon arrival patient was evaluated by rehab specialist who found the patient has a right-sided weakness and slight right-sided facial droop so patient immediately was transferred to the emergency room rule out TIA stroke  According to the ER fellow neurology stroke alert has not been called and neurology was not conducted due to unknown time of onset of symptoms  CT scan reports subacute stoke  Will admit to stroke pathway, frequent neuro checks aspiration fall precaution - patient had nursing swallow assessment - will place on mechanical soft thin liquid diet  Obtain full stroke workup including MRI of the brain carotid Doppler echocardiogram- with titanium nail in hip unsure if mri can be done waiting for answer  The last hemoglobin A1c 8 1 will obtain lipid profile- ldl controled and lipid profile improved from previous  Official speech and swallow evaluation PT OT evaluation- speech is not here till Monday so nursing eval was done passed diet placed  Aspirin ,statin no plavix with recent hip surgery and on lovenox for dvt prophylaxis -will increase chance of bleed    Continue Lovenox for DVT prophylaxis ,status post orthopedic surgery  Labetalol IV p r n  for systolic blood pressure above 190  Code status was discussed with the patient's son,  Emily Barrera, who discussed with his sister and both confirmed that patient's status is DNR/ DNI    Code status changed from full to level 2  Dc iv fluids patient has pulmonary edema and one dose of lasix 20 mg iv x1 will be given   Full neurological exam is very difficult to obtain with her inability to fully follow my directions and also with poor strength due to also global lethargy  Awaiting neurology to eval

## 2018-07-21 NOTE — PROGRESS NOTES
Patient seen and examined somnolent post 1mg of morphine- discussed case with dr Joaquín Person , also suspected right peroneal dvt- will not treat with ac will reevaluate with duplex on Monday for extension and then decide  Mri reviewed- mra posterior artery stenosis high grade continue asa and statin no other inetrevntion  Discussed case with dr Herve Viera  Updated daughter Myles January at bedside  Also daughter stated no issues with po at [place]  Also dc dilauded and decrease morphien

## 2018-07-21 NOTE — PROGRESS NOTES
Progress Note - Marisela Nixon 9/22/1930, 80 y o  female MRN: [id number]    [facility] [place] [facility] Encounter: [id number]    Primary Care Provider: Rico Sales MD   Date and time admitted to hospital: 7/20/2018  7:52 PM        Anemia   Assessment & Plan    · Seems acute blood loss s/p orthopedic surgery   · She is on vitamin b12  · Fluids will be dced   · hemocult  · Start iron   · Cbc in am        S/P ORIF (open reduction internal fixation) fracture   Assessment & Plan    Continue PT OT DVT prophylaxis  Patient will need to continue rehab after stabilization of her acute health issues  lovenox for dvt prophylaxis  Pain medications ordered  Also mild swelling and pain when her b/l lower extremities will obtain vde stat  On lovenox for dvt prophylaxis        Accelerated hypertension   Assessment & Plan    Continue home blood pressure Rx  Labetalol p r n  With subacute cva- we do not have to keep ,maps, dc iv fluids and observe bp- as elevated today in am ? Pain related as well- keep bp not lower then 140          Vitamin D deficiency   Assessment & Plan    Continue vitamin D supplement        Type 2 diabetes mellitus, with long-term current use of insulin [facility])   Assessment & Plan    Lab Results   Component Value Date    HGBA1C 8 1 (H) 07/18/2018       Recent Labs      07/20/18   0752  07/20/18   1016  07/20/18   1620  07/21/18   0548   POCGLU  173*  171*  117*  147*       Blood Sugar Average: Last 72 hrs:  (P) 147 last known hemoglobin A1c 8 1     Sugars controlled with just iss   Will start diet then eval if need to start basals        Hypercholesterolemia   Assessment & Plan    Continue statin  Lipid profile reviewed - ldl 80's         Alzheimers disease   Assessment & Plan    Supportive care  Frequent reorientation  Avoid unnecessary sedatives benzodiazepines pain medication she just underwent orthopedic surgery,   Passed swallow eval - start diet mechanical soft thin liquid  [id number]         Abnormal gait   Assessment & Plan    Patient ambulates with a walker   continue PT OT  Will need rehab after stabilization        * Acute right-sided weakness   Assessment & Plan    Patient was transferred to the ER  from  rehab on 7/21/18 after she found to have a right-sided weakness and right-sided facial droop ,I spoke to the patient's son,  Dr Maicol Rodrigues, who can not identify exact onset of symptoms, however CT of the head obtained by ER attending report" Subacute left basal ganglia lacunar infarct new since the prior study from 3 days earlier"  According to the notes on July 17 patient was admitted to orthopedic service at [facility], status post fall and found to have a right hip fracture  CT of the head  on admission was negative for acute intracranial pathology  Patient successfully underwent  IM nail femur of intertrochanteric femur fracture on 7/18/18 by Faiza Breaux and transferred to  rehab on 7/20  Upon arrival patient was evaluated by rehab specialist who found the patient has a right-sided weakness and slight right-sided facial droop so patient immediately was transferred to the emergency room rule out TIA stroke  According to the ER fellow neurology stroke alert has not been called and neurology was not conducted due to unknown time of onset of symptoms  CT scan reports subacute stoke  Will admit to stroke pathway, frequent neuro checks aspiration fall precaution - patient had nursing swallow assessment - will place on mechanical soft thin liquid diet    Obtain full stroke workup including MRI of the brain carotid Doppler echocardiogram- with titanium nail in hip unsure if mri can be done waiting for answer  The last hemoglobin A1c 8 1 will obtain lipid profile- ldl controled and lipid profile improved from previous  Official speech and swallow evaluation PT OT evaluation- speech is not here till Monday so nursing eval was done passed diet placed  Aspirin ,statin no plavix with recent hip surgery and on lovenox for dvt prophylaxis -will increase chance of bleed  Continue Lovenox for DVT prophylaxis ,status post orthopedic surgery  Labetalol IV p r n  for systolic blood pressure above 190  Code status was discussed with the patient's son, Dr Sang Patel, who discussed with his sister and both confirmed that patient's status is DNR/ DNI  Code status changed from full to level 2  Dc iv fluids patient has pulmonary edema and one dose of lasix 20 mg iv x1 will be given   Full neurological exam is very difficult to obtain with her inability to fully follow my directions and also with poor strength due to also global lethargy  Awaiting neurology to eval             VTE Pharmacologic Prophylaxis:   Pharmacologic: Enoxaparin (Lovenox)  Mechanical VTE Prophylaxis in Place: Yes    Patient Centered Rounds: I have performed bedside rounds with nursing staff today  Discussions with Specialists or Other Care Team Provider: no    Education and Discussions with Family / Patient: not yet     Time Spent for Care: 30 minutes  More than 50% of total time spent on counseling and coordination of care as described above  Current Length of Stay: 1 day(s)    Current Patient Status: Inpatient   Certification Statement: The patient will continue to require additional inpatient hospital stay due to Subacute CVA     Discharge Plan:  Rehab    Code Status: Level 3 - DNAR and DNI      Subjective: The patient seen and examined he is complaining of shortness of breath denies any chest pain  Also does have right hip pain and bilateral leg pain  Objective:     Vitals:   Temp (24hrs), Av 8 °F (36 6 °C), Min:97 2 °F (36 2 °C), Max:99 3 °F (37 4 °C)    HR:  [81-94] 94  Resp:  [13-18] 16  BP: (149-189)/(59-84) 178/76  SpO2:  [93 %-97 %] 93 %  Body mass index is 31 12 kg/m²  Input and Output Summary (last 24 hours):        Intake/Output Summary (Last 24 hours) at 18 0878  Last data filed at 18 0643   Gross per 24 hour   Intake 426 25 ml   Output                0 ml   Net           426 25 ml       Physical Exam:     Physical Exam   Constitutional: She appears well-developed and well-nourished  HENT:   Head: Normocephalic and atraumatic  Eyes: EOM are normal  Pupils are unequal    Neck: Normal range of motion  Cardiovascular: Normal rate and regular rhythm  Murmur heard  Systolic murmur is present with a grade of 2/6   Right 2nd intercostal space   Pulmonary/Chest: Effort normal    Bilateral diffuse crackels   Abdominal: Soft  Bowel sounds are normal    Musculoskeletal: Normal range of motion  She exhibits edema (Nonpitting mild edema bilateral leg tenderness)  Right hip dressing observed dressing is clean there is no subcutaneous hematoma felt   Neurological: She is alert  She has normal reflexes  Very difficult neurological exam is patient is globally lethargic having difficulty to follow directions  A but she does have a mild right-sided facial droop and she has a 3/5 right upper extremity hemiparesis  Lower right leg is very difficult to examine as she does have pain on her right hip if she tries to raise the leg  The left upper extremity is 4 5 left lower extremity 4 5 suspect secondary to generalized weakness  Babinski is equivocal   The reflexes are very difficult to obtain in the lower extremities she has pain when I try to raise  Patient unable to do any coordination ambulation secondary to generalized weakness and hip pain  Skin: Skin is warm  Psychiatric: She has a normal mood and affect           Additional Data:     Labs:      Results from last 7 days  Lab Units 07/21/18  0435   WBC Thousand/uL 9 40   HEMOGLOBIN g/dL 10 0*   HEMATOCRIT % 29 6*   PLATELETS Thousands/uL 197   NEUTROS PCT % 63   LYMPHS PCT % 24   MONOS PCT % 7   EOS PCT % 5       Results from last 7 days  Lab Units 07/21/18  0435 07/20/18 2004   SODIUM mmol/L 138 138   POTASSIUM mmol/L 4 0 4 3   CHLORIDE mmol/L 102 104   CO2 mmol/L 28 27   BUN mg/dL 17 19   CREATININE mg/dL 0 50* 0 48*   CALCIUM mg/dL 10 2 10 5*   TOTAL PROTEIN g/dL  --  7 1   BILIRUBIN TOTAL mg/dL  --  0 70   ALK PHOS U/L  --  76   ALT U/L  --  36   AST U/L  --  55*   GLUCOSE RANDOM mg/dL 131* 141*       Results from last 7 days  Lab Units 07/21/18  0435   INR  1 03       Results from last 7 days  Lab Units 07/21/18  0548 07/20/18  1620 07/20/18  1016 07/20/18  0752 07/19/18  2106 07/19/18  1641 07/19/18  1136 07/19/18  0716 07/18/18  2132 07/18/18  1642 07/18/18  1535 07/18/18  1126   POC GLUCOSE mg/dl 147* 117* 171* 173* 207* 142* 148* 208* 285* 223* 220* 198*       Results from last 7 days  Lab Units 07/18/18  0559   HEMOGLOBIN A1C % 8 1*         * I Have Reviewed All Lab Data Listed Above  * Additional Pertinent Lab Tests Reviewed:  All Labs Within Last 24 Hours Reviewed    Imaging:    Imaging Reports Reviewed Today Include:  Yes  Imaging Personally Reviewed by Myself Includes:  No    Recent Cultures (last 7 days):           Last 24 Hours Medication List:     Current Facility-Administered Medications:  acetaminophen 650 mg Rectal Q6H PRN Junior Laurie MD   amLODIPine 10 mg Oral Daily Cele Claros MD   aspirin 81 mg Oral Daily Junior Laurie MD   atorvastatin 40 mg Oral QPM Junior Laurie MD   bromocriptine 2 5 mg Oral Daily Junior Laurie MD   citalopram 10 mg Oral Daily Junior Laurie MD   cyanocobalamin 1,000 mcg Oral Daily Junior Laurie MD   docusate sodium 100 mg Oral BID Junior Laurie MD   enoxaparin 40 mg Subcutaneous Daily Junior Laurie MD   ferrous sulfate 325 mg Oral Daily With Breakfast Cele Claros MD   furosemide 20 mg Intravenous Once Cele Claros MD   gabapentin 100 mg Oral HS Junior Laurie MD   gadobenate dimeglumine 10 mL Intravenous Once in imaging Cele Claros MD   HYDROmorphone 0 2 mg Intravenous Q4H PRN Junior Laurie MD   insulin lispro 1-5 Units Subcutaneous Q6H [facility] Junior Laurie MD labetalol 10 mg Intravenous Q6H PRN Carrie Joyner MD   lidocaine 1 patch Transdermal Daily Carrie Joyner MD   methylphenidate 10 mg Oral BID before breakfast/lunch Carrie Joyner MD   morphine injection 1 mg Intravenous Q4H PRN Carrie Joyner MD   ondansetron 4 mg Intravenous Q6H PRN Carrie Joyner MD   oxyCODONE 2 5 mg Oral Q4H PRN Carrie Joyner MD   senna 1 tablet Oral Daily Carrie Joyner MD        Today, Patient Was Seen By: Jolynn Diego MD    ** Please Note: Dictation voice to text software may have been used in the creation of this document   **

## 2018-07-21 NOTE — ASSESSMENT & PLAN NOTE
[facility] after she found to have a right-sided weakness and right-sided facial droop ,I spoke to the patient's son,  Dr Belinda Mendez, who can not identify exact onset of symptoms, however CT of the head obtained by ER attending report" Subacute left basal ganglia lacunar infarct new since the prior study from 3 days earlier"  According to the notes on July 17 patient was admitted to orthopedic service at [facility], status post fall and found to have a right hip fracture  CT of the head  on admission was negative for acute intracranial pathology  Patient successfully underwent  IM nail femur of intertrochanteric femur fracture on 7/18/18 by Niles Bolton and transferred to [facility] on 7/20  Upon arrival patient was evaluated by rehab specialist who found the patient has a right-sided weakness and slight right-sided facial droop so patient immediately was transferred to the emergency room rule out TIA stroke  According to the ER fellow neurology stroke alert has not been called and neurology was not conducted due to unknown time of onset of symptoms  CT scan reports subacute stoke  Will admit to stroke pathway, frequent neuro checks aspiration fall precaution currently patient NPO Will continue IV fluids  Obtain full stroke workup including MRI of the brain carotid Doppler echocardiogram  The last hemoglobin A1c 8 1 will obtain lipid profile  Official speech and swallow evaluation PT OT evaluation  Aspirin ,statin  Continue Lovenox for DVT prophylaxis ,status post orthopedic surgery  Labetalol IV p r n  for systolic blood pressure above 190  Code status was discussed with the patient's son, Dr Belinda Gusmane, who discussed with his sister and both confirmed that patient's status is DNR/ DNI    Code status changed from full to level 2

## 2018-07-21 NOTE — ASSESSMENT & PLAN NOTE
Supportive care  Frequent reorientation  Avoid unnecessary sedatives benzodiazepines pain medication she just underwent orthopedic surgery,   Given the patient is NPO until official speech and swallow evaluation , opioid minimum dose IV ordered

## 2018-07-21 NOTE — ASSESSMENT & PLAN NOTE
Continue PT OT DVT prophylaxis  Patient will need to continue rehab after stabilization of her acute health issues    lovenox for dvt prophylaxis  Pain medications ordered  Also mild swelling and pain when her b/l lower extremities will obtain vde stat  On lovenox for dvt prophylaxis

## 2018-07-21 NOTE — H&P
H&P- Kilo Goods 9/22/1930, 80 y o  female MRN: [id number]    Unit/Bed#: 7T [facility] 701-02 Encounter: [id number]    Primary Care Provider: Alexandro Sanchez MD   Date and time admitted to hospital: 7/20/2018  7:52 PM        * Acute right-sided weakness   Assessment & Plan    Patient was transferred to the ER  from [facility] after she found to have a right-sided weakness and right-sided facial droop ,I spoke to the patient's son,  Dr Kenrick Malone, who can not identify exact onset of symptoms, however CT of the head obtained by ER attending report" Subacute left basal ganglia lacunar infarct new since the prior study from 3 days earlier"  According to the notes on July 17 patient was admitted to orthopedic service at [facility], status post fall and found to have a right hip fracture  CT of the head  on admission was negative for acute intracranial pathology  Patient successfully underwent  IM nail femur of intertrochanteric femur fracture on 7/18/18 by Fredrick Leach and transferred to [facility] on 7/20  Upon arrival patient was evaluated by rehab specialist who found the patient has a right-sided weakness and slight right-sided facial droop so patient immediately was transferred to the emergency room rule out TIA stroke  According to the ER fellow neurology stroke alert has not been called and neurology was not conducted due to unknown time of onset of symptoms  CT scan reports subacute stoke    Will admit to stroke pathway, frequent neuro checks aspiration fall precaution currently patient NPO Will continue IV fluids  Obtain full stroke workup including MRI of the brain carotid Doppler echocardiogram  The last hemoglobin A1c 8 1 will obtain lipid profile  Official speech and swallow evaluation PT OT evaluation  Aspirin ,statin  Continue Lovenox for DVT prophylaxis ,status post orthopedic surgery  Labetalol IV p r n  for systolic blood pressure above 190  Code status was discussed with the patient's son, Dr Kenrick Malone, who discussed with his sister and both confirmed that patient's status is DNR/ DNI  Code status changed from full to level 2          Accelerated hypertension   Assessment & Plan    Continue home blood pressure Rx  Labetalol p r n  Type 2 diabetes mellitus, with long-term current use of insulin [facility])   Assessment & Plan    Lab Results   Component Value Date    HGBA1C 8 1 (H) 07/18/2018       Recent Labs      07/19/18   2106  07/20/18   0752  07/20/18   1016  07/20/18   1620   POCGLU  207*  173*  171*  117*       Blood Sugar Average: Last 72 hrs:   last known hemoglobin A1c 8 1 currently patient NPO   will continue gentle IV hydration ,Accu-Cheks, insulin sliding scale   hold oral hypoglycemic and Lantus        S/P ORIF (open reduction internal fixation) fracture   Assessment & Plan    Continue PT OT DVT prophylaxis  Patient will need to continue rehab after stabilization of her acute health issues          Hypercholesterolemia   Assessment & Plan    Continue statin        Abnormal gait   Assessment & Plan    Patient ambulates with a walker   continue PT OT  Will need rehab after stabilization        Alzheimers disease   Assessment & Plan    Supportive care  Frequent reorientation  Avoid unnecessary sedatives benzodiazepines pain medication she just underwent orthopedic surgery,   Given the patient is NPO until official speech and swallow evaluation , opioid minimum dose IV ordered         Vitamin D deficiency   Assessment & Plan    Continue vitamin D supplement        Hypercalcemia   Assessment & Plan    Patient has upcoming appointment with Endocrinology for MEN1  Calcium trending down with IV hydration , will continue IV fluids          Leukocytosis   Assessment & Plan    Most likely reactive, secondary to recent surgery  UA negative, chest x-ray negative for infiltrates or atelectasis  Will encourage incentive spirometer   continue to monitor off antibiotic            VTE Prophylaxis: Enoxaparin (Lovenox)  / sequential compression device   Code Status: DNR/DNI as per patient's son and daughter , both are physicians  POLST: There is no POLST form on file for this patient (pre-hospital)  Discussion with family: Patient's son Aubrey Connor over the phone    Anticipated Length of Stay:  Patient will be admitted on an Inpatient basis with an anticipated length of stay of  > 2 midnights  Justification for Hospital Stay:  Stroke workup    Total Time for Visit, including Counseling / Coordination of Care: 45 minutes  Greater than 50% of this total time spent on direct patient counseling and coordination of care  Chief Complaint:  Unable to assess due to the patient dementia and altered mental status     History of Present Illness:    Laura Kuhn is a 80 y o  female who [facility] after she underwent OR on 7/18 for right IMN for femoral fracture  Upon arrival to rehab she was evaluated by rehab physician and found to have a right-sided weakness with slight right-sided facial droop, patient was transferred to ER where CT of the head showed subacute lacunar infarct  Previous CT scan obtained on July 17 did not report acute intracranial pathology  Given unknown onset of symptoms stroke alert was not called and ER did not contact neurology  According to the patient's son and her daughter, patient is DNR DNI  Due to underlying dementia and acute stroke patient was unable to provide detailed history, she mostly complained on right hip pain    Patient admitted on an inpatient basis for stroke workup    Review of Systems:    Review of Systems   Unable to perform ROS: Dementia       Past Medical and Surgical History:     Past Medical History:   Diagnosis Date    Ankle fracture     Dementia     [facility]     Hypertension     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     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[facility] )        Past Surgical History:   Procedure Laterality Date    CARPAL TUNNEL RELEASE      MO OPEN RX FEMUR FX+INTRAMED RADHA Right 7/18/2018    Procedure: INSERTION NAIL IM FEMUR ANTEGRADE (TROCHANTERIC); Surgeon: Rhiannon Galvan MD;  Location: [facility];  Service: Orthopedics       Meds/Allergies:    Prior to Admission medications    Medication Sig Start Date End Date Taking?  Authorizing Provider   amLODIPine (NORVASC) 5 mg tablet Take 10 mg by mouth daily      Historical Provider, MD   bromocriptine (PARLODEL) 2 5 mg tablet TAKE 1/4 TABLET BY MOUTH AT BEDTIME     Historical Provider, MD   Cholecalciferol (VITAMIN D3) 99078 units TABS Take by mouth daily    Historical Provider, MD   citalopram (CeleXA) 10 mg tablet TAKE 1/2 TABLET BY MOUTH DAILY     Historical Provider, MD   cyanocobalamin (VITAMIN B-12) 1,000 mcg tablet Take by mouth daily    Historical Provider, MD   docusate sodium (COLACE) 100 mg capsule Take 1 capsule (100 mg total) by mouth 2 (two) times a day 7/18/18   Ashley Thakur PA-C   enoxaparin (LOVENOX) 40 mg/0 4 mL Inject 0 4 mL (40 mg total) under the skin daily for 30 days 7/18/18 8/17/18  Ashley Thakur PA-C   gabapentin (NEURONTIN) 100 mg capsule Take 100 mg by mouth daily at bedtime    Historical Provider, MD   LANTUS SOLOSTAR injection pen 100 units/mL 26 Units 2 (two) times a day INJECT 28 UNITS SUBCU  3/5/18   Historical Provider, MD   lidocaine (LIDODERM) 5 % Place 1 patch on the skin daily Remove & Discard patch within 12 hours or as directed by MD    Historical Provider, MD   metFORMIN (GLUCOPHAGE) 1000 MG tablet Take 1 tablet by mouth daily    Historical Provider, MD   methylphenidate (RITALIN) 10 mg tablet TAKE 1/2 TAB DAILY IN EARLY AM  2/15/18   Historical Provider, MD [facility] [place] 30G X 8 MM [address] [facility]  3/23/18   Historical Provider, MD   senna-docusate sodium (SENOKOT S) 8 6-50 mg per tablet Take 2 tablets by mouth daily 7/20/18   Jose Alfredo Huang MD   valsartan (DIOVAN) 160 mg tablet Give one tablet daily  5/10/18   Historical Provider, MD   acetaminophen (TYLENOL) 325 mg tablet 325 mg Take two tablet by mouth at 6:30am and 6:30pm  7/20/18  Historical Provider, MD   acetaminophen (TYLENOL) 325 mg tablet Please take 650 mg Q6hrs PRN for pain 7/19/18 7/20/18  Stephane Romero MD   acetaminophen (TYLENOL) 500 mg tablet TAKE TWO TABLETS BY MOUTH AT 6:30AM AND 6:30PM  8/15/17 7/20/18  Historical Provider, MD   oxyCODONE (ROXICODONE) 5 mg immediate release tablet Take 1 tablets PO q 4 hours PRN Pain 7/18/18 7/20/18  Yemi Styles PA-C     I have reviewed home medications with a medical source (PCP, Pharmacy, other)  Allergies: Allergies   Allergen Reactions    Amoxicillin     Ibuprofen        Social History:     Marital Status:    Occupation: none  Patient Pre-hospital Living Situation: home  Patient Pre-hospital Level of Mobility: walker  Patient Pre-hospital Diet Restrictions:  Unknown  Substance Use History:   History   Alcohol Use No     History   Smoking Status    Never Smoker   Smokeless Tobacco    Never Used     History   Drug Use No       Family History:    non-contributory    Physical Exam:     Vitals:   Blood Pressure: (!) 175/72 (07/21/18 0110)  Pulse: 88 (07/21/18 0110)  Temperature: 98 °F (36 7 °C) (07/21/18 0110)  Temp Source: Temporal (07/21/18 0110)  Respirations: 14 (07/21/18 0110)  Height: 4' 11" (149 9 cm) (07/21/18 0100)  Weight - Scale: 69 9 kg (154 lb 1 6 oz) (07/21/18 0100)  SpO2: 95 % (07/21/18 0110)    Physical Exam   Constitutional: No distress  Eyes: Pupils are equal, round, and reactive to light  Neck: Normal range of motion  Cardiovascular: Regular rhythm  Pulmonary/Chest: No respiratory distress  Abdominal: She exhibits no distension  Musculoskeletal: She exhibits tenderness  Right hip   Neurological:   Confused, RUE and RLE 1/5 strength  LUE and LLE 4/5 strength  Mild right-sided facial droop            Additional Data:     Lab Results: I have personally reviewed pertinent reports          Results from last 7 days  Lab Units 07/20/18 2004 07/19/18  0732   WBC Thousand/uL 11 30* 8 56   HEMOGLOBIN g/dL 11 0* 9 4*   HEMATOCRIT % 33 1* 29 4*   PLATELETS Thousands/uL 212 151   NEUTROS PCT %  --  73   LYMPHS PCT %  --  16   LYMPHO PCT % 15*  --    MONOS PCT %  --  10   MONO PCT MAN % 3  --    EOS PCT %  --  0   EOSINO PCT MANUAL % 3  --        Results from last 7 days  Lab Units 07/20/18 2004   SODIUM mmol/L 138   POTASSIUM mmol/L 4 3   CHLORIDE mmol/L 104   CO2 mmol/L 27   BUN mg/dL 19   CREATININE mg/dL 0 48*   CALCIUM mg/dL 10 5*   TOTAL PROTEIN g/dL 7 1   BILIRUBIN TOTAL mg/dL 0 70   ALK PHOS U/L 76   ALT U/L 36   AST U/L 55*   GLUCOSE RANDOM mg/dL 141*       Results from last 7 days  Lab Units 07/18/18  0559   INR  1 11       Results from last 7 days  Lab Units 07/20/18  1620 07/20/18  1016 07/20/18  0752 07/19/18  2106 07/19/18  1641 07/19/18  1136 07/19/18  0716 07/18/18  2132 07/18/18  1642 07/18/18  1535 07/18/18  1126 07/18/18  0856   POC GLUCOSE mg/dl 117* 171* 173* 207* 142* 148* 208* 285* 223* 220* 198* 228*       Results from last 7 days  Lab Units 07/18/18  0559   HEMOGLOBIN A1C % 8 1*       Imaging: I have personally reviewed pertinent reports  CT head without contrast   Final Result by Nya Son MD (07/20 1947)      Subacute left basal ganglia lacunar infarct new since the prior study from 3 days earlier  The study was marked in [facility] for immediate notification  Workstation performed: [id number]         XR chest 1 view   Final Result by Dash Pedro MD (07/20 2222)      Suspect mild cardiomegaly with mild pulmonary vascular congestion  No definite new consolidation to suggest pneumonia  5 mm right lower lobe lung nodule for which nonemergent outpatient unenhanced CT chest recommended           Workstation performed: [id number]         MRI Inpatient Order    (Results Pending)   VAS carotid complete study (*Order only if CTA has not been completed*)    (Results Pending) EKG, Pathology, and Other Studies Reviewed on Admission:   · EKG: NSR    Allscripts / Epic Records Reviewed: Yes     ** Please Note: This note has been constructed using a voice recognition system   **

## 2018-07-21 NOTE — CASE MANAGEMENT
Initial Clinical Review    Admission: Date/Time/Statement: 7/20/18 @ 2129     Orders Placed This Encounter   Procedures    Inpatient Admission (expected length of stay for this patient is greater than two midnights)     Standing Status:   Standing     Number of Occurrences:   1     Order Specific Question:   Admitting Physician     Answer:   Casimer Alpers [[id number]]     Order Specific Question:   Level of Care     Answer:   Med Surg [[id number]]     Order Specific Question:   Estimated length of stay     Answer:   More than 2 Midnights     Order Specific Question:   Certification     Answer:   I certify that inpatient services are medically necessary for this patient for a duration of greater than two midnights  See H&P and MD Progress Notes for additional information about the patient's course of treatment  ED: Date/Time/Mode of Arrival:   ED Arrival Information     Expected Arrival Acuity Means of Arrival Escorted By Service Admission Type    - 7/20/2018 18:14 Urgent Wheelchair Other General Medicine Urgent    Arrival Complaint    weakness           Chief Complaint:   Chief Complaint   Patient presents with    CVA/TIA-like Symptoms     Dr Jim Topete from [address] [place] [address] concerned of R sided weakeness on pt while doing physician assessment       History of Illness: She was DC from [facility] to rehab after a right hip surgery  She was evaluated by physician at rehab that felt she had RUE weakness that was new and she should likely be evaluated for CVA  Right shoulder: She exhibits decreased range of motion and decreased strength  Right hip: She exhibits decreased range of motion, decreased strength and tenderness  LETHARGIC  Neurological: She appears lethargic  A cranial nerve deficit is present  No sensory deficit  She exhibits abnormal muscle tone     RUE weakness  Right sided facial droop      11 30 (H) 4 31 - 10 16 Thousand/uL      RBC 3 74 (L) 4 00 - 5 20 Million/uL     Hemoglobin 11 0 (L) 12 0 - 16 0 g/dL     Hematocrit 33 1 (L) 36 0 - 46 0 %     MCV 89 82 - 98 fL     MCH 29 5 26 8 - 34 3 pg     MCHC 33 3 31 4 - 37 4 g/dL     RDW 15 0 <15 3 %     MPV 7 2 (L) 8 9 - 12 7 fL     Platelets 401 910 - 584 Thousands/uL   Comprehensive metabolic panel   Result Value Ref Range     Sodium 138 137 - 147 mmol/L     Potassium 4 3 3 6 - 5 0 mmol/L     Chloride 104 97 - 108 mmol/L     CO2 27 22 - 30 mmol/L     Anion Gap 7 5 - 14 mmol/L     BUN 19 5 - 25 mg/dL     Creatinine 0 48 (L) 0 60 - 1 20 mg/dL     Glucose 141 (H) 70 - 99 mg/dL     Calcium 10 5 (H) 8 4 - 10 2 mg/dL     AST 55 (H) 14 - 36 U/L     ALT 36 9 - 52 U/L     Alkaline Phosphatase 76 43 - 122 U/L     Total Protein 7 1 5 9 - 8 4 g/dL     Albumin 3 7 3 0 - 5 2 g/dL     Total Bilirubin 0 70 <1 30 mg/dL     eGFR 88 >60 ml/min/1 73sq m   UA w Reflex to Microscopic w Reflex to Culture   Result Value Ref Range     Color, UA Alice (A) Straw, Yellow, Pale Yellow     Clarity, UA Clear Clear, Other     Specific Gravity, UA 1 020 1 003 - 1 040     pH, UA 6 0 4 5 - 8 0     Leukocytes, UA Negative Negative     Nitrite, UA Negative Negative     Protein, UA Negative Negative mg/dl     Glucose, UA Negative Negative mg/dl     Ketones, UA 5 (Trace) (A) Negative mg/dl     Bilirubin, UA Negative Negative     Blood, UA Negative Negative     UROBILINOGEN UA 1 0 1 0, Negative mg/dL   Troponin I   Result Value Ref Range     Troponin I <0 01 0 00 - 0 03 ng/mL   Grey top tube on hold   Result Value Ref Range     Extra Tube Hold for add-ons      Manual Differential (Non Wam)   Result Value Ref Range     Segmented % 61 45 - 65 %     Bands % 15 (H) 0 - 8 %     Lymphocytes % 15 (L) 20 - 50 %     Monocytes % 3 1 - 10 %     Eosinophils % 3 0 - 6 %     Basophils % 1 0 - 1 %     Myelocytes % 1 0 - 1 %     Atypical Lymphocytes % 1 (H) 0 - 0 %     Neutrophils Absolute 8 59 (H) 1 80 - 7 80 Thousand/uL     Lymphocytes Absolute 1 70 0 50 - 4 00 Thousand/uL     Monocytes Absolute 0 34 0 20 - 0 90 Thousand/uL     Eosinophils Absolute 0 34 0 00 - 0 40 Thousand/uL     Basophils Absolute 0 11 (H) 0 00 - 0 10 Thousand/uL     Total Counted 100       RBC Morphology abnormal       Hypochromia Present       Platelet Estimate Adequate Adequate      CT head without contrast   Final Result       Subacute left basal ganglia lacunar infarct new since the prior study from 3 days earlier                 ED Vital Signs:   ED Triage Vitals   Temperature Pulse Respirations Blood Pressure SpO2   07/20/18 0210 07/20/18 0210 07/20/18 0210 07/20/18 0210 07/20/18 0210   97 5 °F (36 4 °C) 91 16 165/76 93 %      Temp Source Heart Rate Source Patient Position - Orthostatic VS BP Location FiO2 (%)   07/20/18 0210 07/20/18 0210 07/20/18 0210 07/20/18 0210 07/21/18 1300   Temporal Monitor Lying Left arm 2      Pain Score       07/21/18 0125       No Pain        Wt Readings from Last 1 Encounters:   07/21/18 69 9 kg (154 lb 1 6 oz)           ED Treatment:   Medication Administration from 07/20/2018 1814 to 07/20/2018 2300       Date/Time Order Dose Route Action Action by Comments     07/20/2018 2203 fentanyl citrate (PF) 100 MCG/2ML 25 mcg 25 mcg Intravenous Given Jonathan Grijalva RN GIVEN TO ASSIST WITH STRAIGHT CATHETER          Past Medical/Surgical History:    Active Ambulatory Problems     Diagnosis Date Noted    Abnormal gait 09/12/2017    Abnormal weight gain 11/26/2013    Abnormal weight loss 11/26/2013    Acromegalia [facility] 08/15/2017    Alopecia 11/26/2013    Alzheimers disease 12/04/2013    Arthritis 08/15/2017    Bilateral impacted cerumen 08/15/2017    Depression 12/04/2013    Fatigue 11/26/2013    Hypercholesterolemia 11/26/2013    Hypertension 11/26/2013    Osteopenia 11/26/2013    Sleep disorder 11/26/2013    Type 2 diabetes mellitus, with long-term current use of insulin ([facility] ) 08/15/2017    Vitamin D deficiency 08/15/2017    Candidal UTI (urinary tract infection) 05/08/2018    RAZIA (obstructive sleep apnea)      Resolved Ambulatory Problems     Diagnosis Date Noted    Abnormal glucose 11/26/2013    Diarrhea due to drug 11/21/2017     Past Medical History:   Diagnosis Date    Ankle fracture     Dementia     [facility]     Hypertension     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HCC)     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[facility])        Admitting Diagnosis: Abnormal gait [R26 9]  Weakness [R53 1]  CVA (cerebral vascular accident) ([facility] ) [I63 9]    Age/Sex: 80 y o  female    Assessment/Plan:     Patient was transferred to the ER  from [facility] after she found to have a right-sided weakness and right-sided facial droop ,I spoke to the patient's son,  Dr Yumiko Hooks, who can not identify exact onset of symptoms, however CT of the head obtained by ER attending report" Subacute left basal ganglia lacunar infarct new since the prior study from 3 days earlier"  According to the notes on July 17 patient was admitted to orthopedic service at [facility], status post fall and found to have a right hip fracture  CT of the head  on admission was negative for acute intracranial pathology  Patient successfully underwent  IM nail femur of intertrochanteric femur fracture on 7/18/18 by Delfino Baltazar and transferred to [facility] on 7/20  Upon arrival patient was evaluated by rehab specialist who found the patient has a right-sided weakness and slight right-sided facial droop so patient immediately was transferred to the emergency room rule out TIA stroke  According to the ER fellow neurology stroke alert has not been called and neurology was not conducted due to unknown time of onset of symptoms  CT scan reports subacute stoke    Will admit to stroke pathway, frequent neuro checks aspiration fall precaution currently patient NPO Will continue IV fluids  Obtain full stroke workup including MRI of the brain carotid Doppler echocardiogram  The last hemoglobin A1c 8 1 will obtain lipid profile  Official speech and swallow evaluation PT OT evaluation  Aspirin ,statin  Continue Lovenox for DVT prophylaxis ,status post orthopedic surgery  Labetalol IV p r n  for systolic blood pressure above 190  Code status was discussed with the patient's son, Dr Sang Patel, who discussed with his sister and both confirmed that patient's status is DNR/ DNI    Code status changed from full to level 2         Admission Orders:  Scheduled Meds:   Current Facility-Administered Medications:  acetaminophen 650 mg Rectal Q6H PRN Edmundo Raya MD   amLODIPine 10 mg Oral Daily Janet Alfred MD   aspirin 81 mg Oral Daily Edmundo Raya MD   atorvastatin 40 mg Oral QPM Edmundo Raya MD   bromocriptine 2 5 mg Oral Daily Edmundo Raya MD   citalopram 10 mg Oral Daily Edmundo Raya MD   clopidogrel 75 mg Oral Daily Janet Alfred MD   cyanocobalamin 1,000 mcg Oral Daily Edmundo Raya MD   docusate sodium 100 mg Oral BID Edmundo Raya MD   enoxaparin 40 mg Subcutaneous Daily Edmundo Raya MD   ferrous sulfate 325 mg Oral Daily With Breakfast Janet Alfred MD   gabapentin 100 mg Oral HS Edmundo Raya MD   gadobenate dimeglumine 10 mL Intravenous Once in imaging Janet Alfred MD   insulin lispro 1-5 Units Subcutaneous TID AC Janet Alfred MD   labetalol 10 mg Intravenous Q6H PRN Edmundo Raya MD   lidocaine 1 patch Transdermal Daily Edmundo Raya MD   methylphenidate 10 mg Oral BID before breakfast/lunch Edmundo Raya MD   morphine injection 0 5 mg Intravenous Q4H PRN Janet Alfred MD   ondansetron 4 mg Intravenous Q6H PRN Edmundo Raya MD   oxyCODONE 2 5 mg Oral Q4H PRN Edmundo Raya MD   senna 1 tablet Oral Daily Edmundo Raya MD     Continuous Infusions:    PRN Meds:   acetaminophen    gadobenate dimeglumine    labetalol    morphine injection    ondansetron    oxyCODONE   ASPIRATION PRECAUTION  FALL PRECAUTION  BLOOD SUGARS Q 6 HRS  WITH SLIDING INSULIN SCALE  INCENTIVE SPIROMETRY  STROKE EDUCATION TO PATIENTS  NEURO CHECK Q 1 HRS X 4  THEN Q 2 HRS X 4 THAN Q 4 HRS X 72 HRS  NPO  SPEECH EVAL  ECHO  VS Q 1 HR X 4 THEN Q 4 HRS  VAS LOWER LIMB VENOUS DUPLEX STUDY  PT/OT EVAL AND TREAT  48 HRS TELEMETRY  SEQUENTIAL COMPRESSION DEVICE

## 2018-07-21 NOTE — ASSESSMENT & PLAN NOTE
· Seems acute blood loss s/p orthopedic surgery   · She is on vitamin b12  · Fluids will be dced   · hemocult  · Start iron   · Cbc in am

## 2018-07-21 NOTE — NURSING NOTE
Pt comfortable  VSS  SR on monitor  Trace edema to ankles  o x 2  Neuro unchanged  Right facial droop  Right sided weakness  Lungs clear   +Bs  Abdomen softly distended  Incontinent of bladder  Bed alarm on  Daughter at bedside  Will continue to monitor

## 2018-07-21 NOTE — NURSING NOTE
Pt awake alert oriented tp person and place  Pt reports pain in her right hip  VS stable  Monitor shoes tachy with rate 110 with minimal movements in bed  Pt hand  are weak more in right side  Impaired finger nose coordination on right side  Pt passed bed side nursing swallow evaluation  SCD on  Otherwise assessment remains unchanged  Will continue to monitor

## 2018-07-22 ENCOUNTER — APPOINTMENT (INPATIENT)
Dept: RADIOLOGY | Facility: HOSPITAL | Age: 83
DRG: 064 | End: 2018-07-22
Payer: MEDICARE

## 2018-07-22 PROBLEM — I82.401 ACUTE DEEP VEIN THROMBOSIS (DVT) OF RIGHT LOWER EXTREMITY (HCC): Status: ACTIVE | Noted: 2018-07-22

## 2018-07-22 LAB
ANION GAP SERPL CALCULATED.3IONS-SCNC: 6 MMOL/L (ref 5–14)
BASOPHILS # BLD AUTO: 0.1 THOUSANDS/ΜL (ref 0–0.1)
BASOPHILS NFR BLD AUTO: 1 % (ref 0–1)
BUN SERPL-MCNC: 21 MG/DL (ref 5–25)
CALCIUM SERPL-MCNC: 10.2 MG/DL (ref 8.4–10.2)
CHLORIDE SERPL-SCNC: 103 MMOL/L (ref 97–108)
CO2 SERPL-SCNC: 27 MMOL/L (ref 22–30)
CREAT SERPL-MCNC: 0.52 MG/DL (ref 0.6–1.2)
EOSINOPHIL # BLD AUTO: 0.4 THOUSAND/ΜL (ref 0–0.4)
EOSINOPHIL NFR BLD AUTO: 6 % (ref 0–6)
ERYTHROCYTE [DISTWIDTH] IN BLOOD BY AUTOMATED COUNT: 15 %
FERRITIN SERPL-MCNC: 151 NG/ML (ref 8–388)
GFR SERPL CREATININE-BSD FRML MDRD: 86 ML/MIN/1.73SQ M
GLUCOSE SERPL-MCNC: 155 MG/DL (ref 70–99)
GLUCOSE SERPL-MCNC: 159 MG/DL (ref 70–99)
GLUCOSE SERPL-MCNC: 177 MG/DL (ref 70–99)
GLUCOSE SERPL-MCNC: 181 MG/DL (ref 70–99)
GLUCOSE SERPL-MCNC: 276 MG/DL (ref 70–99)
HCT VFR BLD AUTO: 27.8 % (ref 36–46)
HGB BLD-MCNC: 9.5 G/DL (ref 12–16)
IRON SATN MFR SERPL: 21 %
IRON SERPL-MCNC: 56 UG/DL (ref 50–170)
LYMPHOCYTES # BLD AUTO: 2.1 THOUSANDS/ΜL (ref 0.5–4)
LYMPHOCYTES NFR BLD AUTO: 27 % (ref 20–50)
MAGNESIUM SERPL-MCNC: 1.9 MG/DL (ref 1.6–2.3)
MCH RBC QN AUTO: 30.3 PG (ref 26.8–34.3)
MCHC RBC AUTO-ENTMCNC: 34.1 G/DL (ref 31.4–37.4)
MCV RBC AUTO: 89 FL (ref 82–98)
MONOCYTES # BLD AUTO: 0.6 THOUSAND/ΜL (ref 0.2–0.9)
MONOCYTES NFR BLD AUTO: 8 % (ref 1–10)
NEUTROPHILS # BLD AUTO: 4.6 THOUSANDS/ΜL (ref 1.8–7.8)
NEUTS SEG NFR BLD AUTO: 59 % (ref 45–65)
PLATELET # BLD AUTO: 212 THOUSANDS/UL (ref 150–450)
PMV BLD AUTO: 7.3 FL (ref 8.9–12.7)
POTASSIUM SERPL-SCNC: 4.1 MMOL/L (ref 3.6–5)
RBC # BLD AUTO: 3.13 MILLION/UL (ref 4–5.2)
SODIUM SERPL-SCNC: 136 MMOL/L (ref 137–147)
TIBC SERPL-MCNC: 264 UG/DL (ref 250–450)
WBC # BLD AUTO: 7.9 THOUSAND/UL (ref 4.31–10.16)

## 2018-07-22 PROCEDURE — 82728 ASSAY OF FERRITIN: CPT | Performed by: FAMILY MEDICINE

## 2018-07-22 PROCEDURE — 71045 X-RAY EXAM CHEST 1 VIEW: CPT

## 2018-07-22 PROCEDURE — 99232 SBSQ HOSP IP/OBS MODERATE 35: CPT | Performed by: FAMILY MEDICINE

## 2018-07-22 PROCEDURE — 83540 ASSAY OF IRON: CPT | Performed by: FAMILY MEDICINE

## 2018-07-22 PROCEDURE — 82948 REAGENT STRIP/BLOOD GLUCOSE: CPT

## 2018-07-22 PROCEDURE — 83735 ASSAY OF MAGNESIUM: CPT | Performed by: FAMILY MEDICINE

## 2018-07-22 PROCEDURE — 83550 IRON BINDING TEST: CPT | Performed by: FAMILY MEDICINE

## 2018-07-22 PROCEDURE — 99232 SBSQ HOSP IP/OBS MODERATE 35: CPT | Performed by: PSYCHIATRY & NEUROLOGY

## 2018-07-22 PROCEDURE — 85025 COMPLETE CBC W/AUTO DIFF WBC: CPT | Performed by: FAMILY MEDICINE

## 2018-07-22 PROCEDURE — 80048 BASIC METABOLIC PNL TOTAL CA: CPT | Performed by: FAMILY MEDICINE

## 2018-07-22 RX ORDER — BISACODYL 10 MG
10 SUPPOSITORY, RECTAL RECTAL DAILY PRN
Status: DISCONTINUED | OUTPATIENT
Start: 2018-07-22 | End: 2018-07-24 | Stop reason: HOSPADM

## 2018-07-22 RX ORDER — AMLODIPINE BESYLATE 10 MG/1
10 TABLET ORAL DAILY
Status: DISCONTINUED | OUTPATIENT
Start: 2018-07-22 | End: 2018-07-24 | Stop reason: HOSPADM

## 2018-07-22 RX ORDER — FUROSEMIDE 10 MG/ML
40 INJECTION INTRAMUSCULAR; INTRAVENOUS ONCE
Status: COMPLETED | OUTPATIENT
Start: 2018-07-22 | End: 2018-07-22

## 2018-07-22 RX ORDER — ACETAMINOPHEN 325 MG/1
650 TABLET ORAL EVERY 6 HOURS PRN
Status: DISCONTINUED | OUTPATIENT
Start: 2018-07-22 | End: 2018-07-24 | Stop reason: HOSPADM

## 2018-07-22 RX ADMIN — ACETAMINOPHEN 650 MG: 325 TABLET ORAL at 17:50

## 2018-07-22 RX ADMIN — CITALOPRAM HYDROBROMIDE 10 MG: 10 TABLET ORAL at 08:28

## 2018-07-22 RX ADMIN — INSULIN LISPRO 1 UNITS: 100 INJECTION, SOLUTION INTRAVENOUS; SUBCUTANEOUS at 06:50

## 2018-07-22 RX ADMIN — INSULIN LISPRO 1 UNITS: 100 INJECTION, SOLUTION INTRAVENOUS; SUBCUTANEOUS at 17:00

## 2018-07-22 RX ADMIN — CYANOCOBALAMIN TAB 1000 MCG 1000 MCG: 1000 TAB at 08:28

## 2018-07-22 RX ADMIN — CLOPIDOGREL 75 MG: 75 TABLET, FILM COATED ORAL at 08:28

## 2018-07-22 RX ADMIN — ASPIRIN 81 MG 81 MG: 81 TABLET ORAL at 08:27

## 2018-07-22 RX ADMIN — SENNOSIDES 8.6 MG: 8.6 TABLET, FILM COATED ORAL at 08:28

## 2018-07-22 RX ADMIN — INSULIN LISPRO 1 UNITS: 100 INJECTION, SOLUTION INTRAVENOUS; SUBCUTANEOUS at 12:21

## 2018-07-22 RX ADMIN — DOCUSATE SODIUM 100 MG: 100 CAPSULE, LIQUID FILLED ORAL at 08:29

## 2018-07-22 RX ADMIN — FERROUS SULFATE TAB 325 MG (65 MG ELEMENTAL FE) 325 MG: 325 (65 FE) TAB at 06:46

## 2018-07-22 RX ADMIN — BROMOCRIPTINE MESYLATE 2.5 MG: 2.5 TABLET ORAL at 08:31

## 2018-07-22 RX ADMIN — FUROSEMIDE 40 MG: 10 INJECTION, SOLUTION INTRAVENOUS at 08:27

## 2018-07-22 RX ADMIN — BISACODYL 10 MG: 5 TABLET, COATED ORAL at 17:02

## 2018-07-22 RX ADMIN — METHYLPHENIDATE HYDROCHLORIDE 10 MG: 10 TABLET ORAL at 12:21

## 2018-07-22 RX ADMIN — LIDOCAINE 1 PATCH: 50 PATCH CUTANEOUS at 08:27

## 2018-07-22 RX ADMIN — METHYLPHENIDATE HYDROCHLORIDE 10 MG: 10 TABLET ORAL at 06:46

## 2018-07-22 RX ADMIN — DOCUSATE SODIUM 100 MG: 100 CAPSULE, LIQUID FILLED ORAL at 17:02

## 2018-07-22 RX ADMIN — ENOXAPARIN SODIUM 40 MG: 100 INJECTION SUBCUTANEOUS at 08:27

## 2018-07-22 RX ADMIN — ATORVASTATIN CALCIUM 40 MG: 40 TABLET, FILM COATED ORAL at 17:05

## 2018-07-22 NOTE — ASSESSMENT & PLAN NOTE
· Duplex was done yesterday will not treat, unless it extends more with a repeat duplex on Monday  I discussed with Dr Herve Greene as well agrees upon this

## 2018-07-22 NOTE — PROGRESS NOTES
Progress Note - Angélica John 9/22/1930, 80 y o  female MRN: [id number]    Unit/Bed#: 7T [facility] 701-02 Encounter: [id number]    Primary Care Provider: María Ford MD   Date and time admitted to hospital: 7/20/2018  7:52 PM        Acute deep vein thrombosis (DVT) of right lower extremity [facility])   Assessment & Plan    · Duplex was done yesterday will not treat, unless it extends more with a repeat duplex on Monday  I discussed with Dr Alberto Christian as well agrees upon this  Shortness of breath   Assessment & Plan    · 2/2 pulmonary edema and reviewed xray as well-shortness of breath has improved but she still has crackles more towards the lower lung fields, she is incontinent so the urine output cannot be adequately assessed but the weight went up since yesterday-will give another dose of Lasix but Lasix 40 IV x1 today and there since the portable chest x-ray  · Normal echo with ef back in 2015  · Now echo pending   · ProBNP is still pending  · EKG reviewed no acute ischemic changes troponins x2 were negative  · She is 97% on 1 5 L of oxygen        Anemia   Assessment & Plan    · Seems acute blood loss s/p orthopedic surgery   · She is on vitamin b12  · Fluids will be dced   · hemocult-pending  · Start iron   · Cbc in am-dropped to 9 5        S/P ORIF (open reduction internal fixation) fracture   Assessment & Plan    Continue PT OT DVT prophylaxis  Patient will need to continue rehab after stabilization of her acute health issues  lovenox for dvt prophylaxis  Pain medications ordered  Also mild swelling and pain when her b/l lower extremities will obtain vde stat-for nonocclusive right lower extremity peroneal vein DVT  On lovenox for dvt prophylaxis        Accelerated hypertension   Assessment & Plan    Continue home blood pressure Rx  I also would like patient to avoid any hypotension like her blood pressures to be anywhere between 140s up to 150s I will change Norvasc parameters to hold less than 140  Vitamin D deficiency   Assessment & Plan    Continue vitamin D supplement        Type 2 diabetes mellitus, with long-term current use of insulin [facility])   Assessment & Plan    Lab Results   Component Value Date    HGBA1C 8 1 (H) 07/18/2018       Recent Labs      07/21/18   1154  07/21/18   1540  07/21/18   2049  07/22/18   0616   POCGLU  152*  200*  152*  159*       Blood Sugar Average: Last 72 hrs:  (P) 162 last known hemoglobin A1c 8 1     Sugars controlled with just iss   Been controlled still is on diet started         Hypercholesterolemia   Assessment & Plan    Continue statin  Lipid profile reviewed - ldl 80's         Alzheimers disease   Assessment & Plan    Supportive care  Frequent reorientation  Avoid unnecessary sedatives benzodiazepines pain medication she just underwent orthopedic surgery,   Passed swallow eval - start diet mechanical soft thin liquid  aaox2   Patient was previously on Aricept but not anymore   Patient is very sensitive to pain medications it is hard to arouse her in the morning I will actually DC and nighttime gabapentin  She is a minimal dose of narcotics of morphine and oxycodone code and p r n  Abnormal gait   Assessment & Plan    Patient ambulates with a walker   continue PT OT  Will need rehab after stabilization        * Acute right-sided weakness   Assessment & Plan    Patient was transferred to the ER  from [facility] rehab on 7/21/18 after she found to have a right-sided weakness and right-sided facial droop ,I spoke to the patient's son,  Dr Edward Astorga, who can not identify exact onset of symptoms, however CT of the head obtained by ER attending report" Subacute left basal ganglia lacunar infarct new since the prior study from 3 days earlier"  According to the notes on July 17 patient was admitted to orthopedic service at [facility], status post fall and found to have a right hip fracture  CT of the head  on admission was negative for acute intracranial pathology    Patient successfully underwent  IM nail femur of intertrochanteric femur fracture on 7/18/18 by Dr Nathan and transferred to [facility] rehab on 7/20  Upon arrival patient was evaluated by rehab specialist who found the patient has a right-sided weakness and slight right-sided facial droop so patient immediately was transferred to the emergency room rule out TIA stroke  According to the ER fellow neurology stroke alert has not been called and neurology was not conducted due to unknown time of onset of symptoms  CT scan reports subacute stoke  Will admit to stroke pathway, frequent neuro checks aspiration fall precaution - patient had nursing swallow assessment - will place on mechanical soft thin liquid diet  Obtain full stroke workup including MRI of the brain carotid Doppler echocardiogram- with titanium nail in hip unsure if mri can be done waiting for answer  The last hemoglobin A1c 8 1 will obtain lipid profile- ldl controled and lipid profile improved from previous  Official speech and swallow evaluation PT OT evaluation- speech is not here till Monday so nursing eval was done passed diet placed  Aspirin ,statin and plavix started as well discussed with son was agreeble upon, will be on dual antiplatelets for 3 weeks then back to asa- prior was not on as or statin  Continue Lovenox for DVT prophylaxis ,status post orthopedic surgery  Labetalol IV p r n  for systolic blood pressure above 190  Code status was discussed with the patient's son,  Sang Patel, who discussed with his sister and both confirmed that patient's status is DNR/ DNI    Code status changed from full to level 2  Dc iv fluids patient has pulmonary edema and one dose of lasix 20 mg iv x1 will be given   Full neurological exam is very difficult to obtain with her inability to fully follow my directions and also with poor strength due to also global lethargy  Discussed case with dr Jade Mcguire as wel  Mri reviewed and mra as well- stenosis of pca- discussed with  zahira no further intervention besides antiplatelets and statin  Reviewed carotid ultrasound there is a 50% stenosis bilaterally withdrawal to plaque she will continue antiplatelets and statin-follow-up of patient repeat ultrasound in about 6 months  Boundary right upper extremity weakness as more improvement today to 4 5 she has but did lift against gravity and some resistance today  A right facial droop is still evident mild right lower extremity unable to be examined because of the pain  · MRI of the brain result-Subacute nonhemorrhagic lacunar infarction measuring up to 15 mm in greatest axial dimension and involving left posterior periventricular white matter and left posterior putamen  Onset of this lacunar infarction was probably between 2 and 4 days prior  · She has echo pending tomorrow               VTE Pharmacologic Prophylaxis:   Pharmacologic: Enoxaparin (Lovenox)  Mechanical VTE Prophylaxis in Place: Yes    Patient Centered Rounds: I have performed bedside rounds with nursing staff today  Discussions with Specialists or Other Care Team Provider: yes    Education and Discussions with Family / Patient: yes    Time Spent for Care: 30 minutes  More than 50% of total time spent on counseling and coordination of care as described above  Current Length of Stay: 2 day(s)    Current Patient Status: Inpatient   Certification Statement: The patient will continue to require additional inpatient hospital stay due to Evaluation with echo and also assessment by PT OT repeat venous duplex  Discharge Plan: rehab    Code Status: Level 3 - DNAR and DNI      Subjective:   Patient is seen and examined had to wake her up but easily walk up follows direction denies any chest pain and says shortness of breath has improved  Nursing not sure when she had a BM    Patient does take a pending at night and she only required 1 p r n  during the night the oxycodone it is hard to wake her up in am when you do she does wake up     Objective:     Vitals:   Temp (24hrs), Av 4 °F (36 3 °C), Min:96 2 °F (35 7 °C), Max:98 8 °F (37 1 °C)    HR:  [71-88] 71  Resp:  [16-20] 18  BP: (122-164)/(59-89) 145/69  SpO2:  [95 %-99 %] 97 %  Body mass index is 31 7 kg/m²  Input and Output Summary (last 24 hours): Intake/Output Summary (Last 24 hours) at 18 0826  Last data filed at 18 1715   Gross per 24 hour   Intake              410 ml   Output                0 ml   Net              410 ml       Physical Exam:     Physical Exam   Constitutional: She appears well-developed and well-nourished  HENT:   Head: Normocephalic and atraumatic  Eyes: EOM are normal  Pupils are equal, round, and reactive to light  Neck: Normal range of motion  Neck supple  Cardiovascular: Normal rate, regular rhythm and normal heart sounds  Pulmonary/Chest: Effort normal    Crackles bilaterally toward the lower some mid lobe   Abdominal: Soft  Bowel sounds are normal    Musculoskeletal: Normal range of motion  She exhibits edema (Mild nonpitting bilaterally) and tenderness (On the whole right leg)  Neurological: She is alert  She has normal reflexes  Very hard to obtain orientation as I just woke the patient up but she knows her name, also the right upper extremity the strength has improved to 4 of 5 she does have gravity as she did yesterday but she has a little bit more resistance as opposed to yesterday like I said the rest of the neurological exam very difficult to attain with global lethargy to follow direction  Skin: Skin is warm  Psychiatric: She has a normal mood and affect           Additional Data:     Labs:      Results from last 7 days  Lab Units 18   WBC Thousand/uL 7 90   HEMOGLOBIN g/dL 9 5*   HEMATOCRIT % 27 8*   PLATELETS Thousands/uL 212   NEUTROS PCT % 59   LYMPHS PCT % 27   MONOS PCT % 8   EOS PCT % 6       Results from last 7 days  Lab Units 18   SODIUM mmol/L 136*  < > 138 POTASSIUM mmol/L 4 1  < > 4 3   CHLORIDE mmol/L 103  < > 104   CO2 mmol/L 27  < > 27   BUN mg/dL 21  < > 19   CREATININE mg/dL 0 52*  < > 0 48*   CALCIUM mg/dL 10 2  < > 10 5*   TOTAL PROTEIN g/dL  --   --  7 1   BILIRUBIN TOTAL mg/dL  --   --  0 70   ALK PHOS U/L  --   --  76   ALT U/L  --   --  36   AST U/L  --   --  55*   GLUCOSE RANDOM mg/dL 155*  < > 141*   < > = values in this interval not displayed  Results from last 7 days  Lab Units 07/21/18  0435   INR  1 03       Results from last 7 days  Lab Units 07/22/18  0616 07/21/18  2049 07/21/18  1540 07/21/18  1154 07/21/18  0548 07/20/18  1620 07/20/18  1016 07/20/18  0752 07/19/18  2106 07/19/18  1641 07/19/18  1136 07/19/18  0716   POC GLUCOSE mg/dl 159* 152* 200* 152* 147* 117* 171* 173* 207* 142* 148* 208*       Results from last 7 days  Lab Units 07/18/18  0559   HEMOGLOBIN A1C % 8 1*         * I Have Reviewed All Lab Data Listed Above  * Additional Pertinent Lab Tests Reviewed:  All Labs Within Last 24 Hours Reviewed    Imaging:    Imaging Reports Reviewed Today Include: yes  Imaging Personally Reviewed by Myself Includes:  no    Recent Cultures (last 7 days):           Last 24 Hours Medication List:     Current Facility-Administered Medications:  acetaminophen 650 mg Rectal Q6H PRN Pablo Renteria MD   amLODIPine 10 mg Oral Daily Ned Puente MD   aspirin 81 mg Oral Daily Pablo Renteria MD   atorvastatin 40 mg Oral QPM Pablo Renteria MD   bisacodyl 10 mg Oral Daily PRN Ned Puente MD   bisacodyl 10 mg Rectal Daily PRN Ned Puente MD   bromocriptine 2 5 mg Oral Daily Pablo Renteria MD   citalopram 10 mg Oral Daily Pablo Renteria MD   clopidogrel 75 mg Oral Daily Ned Puente MD   cyanocobalamin 1,000 mcg Oral Daily Pablo Renteria MD   docusate sodium 100 mg Oral BID Pablo Renteria MD   enoxaparin 40 mg Subcutaneous Daily Pablo Renteria MD   ferrous sulfate 325 mg Oral Daily With Breakfast Ned Puente MD furosemide 40 mg Intravenous Once Jolie Ahn MD   gadobenate dimeglumine 10 mL Intravenous Once in imaging Jolie Ahn MD   insulin lispro 1-5 Units Subcutaneous TID AC Jolie Ahn MD   labetalol 10 mg Intravenous Q6H PRN Evelia Cassidy MD   lidocaine 1 patch Transdermal Daily Evelia Cassidy MD   methylphenidate 10 mg Oral BID before breakfast/lunch Evelia Cassidy MD   morphine injection 0 5 mg Intravenous Q4H PRN Jolie Ahn MD   ondansetron 4 mg Intravenous Q6H PRN Evelia Cassidy MD   oxyCODONE 2 5 mg Oral Q4H PRN Evelia Cassidy MD   senna 1 tablet Oral Daily Evelia Cassidy MD        Today, Patient Was Seen By: Jolie Ahn MD    ** Please Note: Dictation voice to text software may have been used in the creation of this document   **

## 2018-07-22 NOTE — NURSING NOTE
Assessment remains unchanged  VS Stable  SR on monitor  Offers no complaints  Will continue to monitor  Call bell in reach

## 2018-07-22 NOTE — ASSESSMENT & PLAN NOTE
Continue home blood pressure Rx  I also would like patient to avoid any hypotension like her blood pressures to be anywhere between 140s up to 150s I will change Norvasc parameters to hold less than 140

## 2018-07-22 NOTE — NURSING NOTE
Pt has responded better to [place]  Pt stated name, and where she is  Speech clear  Neuro assessment unchanged  Reports pain, medicated with Oxy IR  Incontinent for bladder  Brief changed and repositioned  Will continue to monitor  Call bell in reach

## 2018-07-22 NOTE — SPEECH THERAPY NOTE
Speech Language/Pathology  Dysphagia consult received  Attempted to see patient, but difficult to arouse and maintain alertness for safe PO intake  Staff reported minimal PO intake, but no coughing with thin liquids  ST will f/u and assess when appropriate

## 2018-07-22 NOTE — PROGRESS NOTES
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        2   Encephalopathy likely related to pain medications  This is somewhat better today but she continued to be mildly encephalopathic       3  Dementia without behavioral disturbances      4   Ambulatory dysfunction with history of fall is status post fracture of the femur requiring surgical intervention      4  Hypertension  I agree to keep the systolic blood pressure in the range of   140-150s      Plan:  1  She will be on aspirin 81 mg a day, Lipitor 40 mg a day for stroke prevention  She is also on Lovenox 40 mg a day for DVT prophylaxis     2  Continue Plavix 75 mg a day for 3 weeks and then it will be discontinued  3    the echocardiogram is pending     4  MRI of the brain shows possible of right PCA stenosis which is asymptomatic  The MRI also shows evidence of motion artifact  Please do not hesitate to contract with Neurology if you have any question or concern  Subjective   I spoke with nursing staff and hospitalist   She has been more awake but still lethargic she  ate minimum food  She did not talk much with me    Temp:  [96 2 °F (35 7 °C)-98 8 °F (37 1 °C)] 97 1 °F (36 2 °C)  HR:  [71-80] 80  Resp:  [16-20] 20  BP: (122-152)/(59-98) 144/98  FiO2 (%):  [2] 2  I/O last 3 completed shifts: In: 836 3 [P O :360; I V :426 3; IV Piggyback:50]  Out: -   No intake/output data recorded  Objective  Neurologic Exam     Neurologic Exam     She found to be lethargic but arousable  Once awake, she was able to follow one-step command on inconsistent basis  She was able to say 1 or 2 words  She did not talk much and found to have mild dysarthria  Her pupils were about 2 with 3 mm round and reactive to light  She can move eyes to cross midline  She found to have a right central type of facial weakness       Her motor examination shows slightly increased muscle tone in right upper extremity  He was able to move right upper extremity against gravity  On left upper extremity, she was able to move it against gravity       On lower extremities, she was able to wiggle toes of left foot and tried to move the toes of right foot  She did not move her lower extremities against gravity       It is difficult to assess her NIH stroke scale due to lethargy and limited cooperation  Carotid duplex studies unremarkable    Echo is pending    Principal Problem:    Acute right-sided weakness  Active Problems:    Abnormal gait    Alzheimers disease    Hypercholesterolemia    Type 2 diabetes mellitus, with long-term current use of insulin ([facility])    Vitamin D deficiency    Accelerated hypertension    S/P ORIF (open reduction internal fixation) fracture    Anemia    Shortness of breath    Acute deep vein thrombosis (DVT) of right lower extremity ([address] )

## 2018-07-22 NOTE — ASSESSMENT & PLAN NOTE
Continue PT OT DVT prophylaxis  Patient will need to continue rehab after stabilization of her acute health issues  lovenox for dvt prophylaxis  Pain medications ordered  Also mild swelling and pain when her b/l lower extremities will obtain vde stat-for nonocclusive right lower extremity peroneal vein DVT    On lovenox for dvt prophylaxis

## 2018-07-22 NOTE — PHYSICIAN ADVISOR
Current patient class: Inpatient  The patient is currently on Hospital Day: 2 at [address]        The patient was admitted to the hospital  on 7/20/18 at 2129 for the following diagnosis:  Abnormal gait [R26 9]  Weakness [R53 1]  CVA (cerebral vascular accident) ([facility] [address] ) [I63 9]       There is documentation in the medical record of an expected length of stay of at least 2 midnights  The patient is therefore expected to satisfy the 2 midnight benchmark and given the 2 midnight presumption is appropriate for INPATIENT ADMISSION  Given this expectation of a satisfying stay, CMS instructs us that the patient is most often appropriate for inpatient admission under part A provided medical necessity is documented in the chart  After review of the relevant documentation, labs, vital signs and test results, the patient is appropriate for INPATIENT ADMISSION  Admission to the hospital as an inpatient is a complex decision making process which requires the practitioner to consider the patients presenting complaint, history and physical examination and all relevant testing  With this in mind, in this case, the patient was deemed appropriate for INPATIENT ADMISSION  After review of the documentation and testing available at the time of the admission I concur with this clinical determination of medical necessity  The patient does have an inpatient admission within the previous 30 days  The patient was admitted on 7/17/18 and discharged on 7/20/18 as an inpatient  The patient therefore required readmission review  In this case the patient should be considered a SEPARATE and UNRELATED INPATIENT ADMISSION  The patient had been discharged in stable condition with a completed care plan  There were no unresolved acute medical issues at the time of discharged which would have reasonably been expected to prompt this readmission      The previous inpatient admission from 7/17/18 through 7/20/18 was for a closed, right hip fracture requiring right femoral intramedullary nail placement  The current inpatient admission is for a subacute CVA/lacunar infarction  These inpatient admissions are considered SEPARATE AND UNRELATED INPATIENT ADMISSIONS  Rationale is as follows: The patient is an 80year-old female who presented to the ED from the acute rehabilitation center [facility] with new-onset right upper extremity weakness  A CT scan of the head without contrast was completed on 7/20/18 with the following results:  IMPRESSION:     Subacute left basal ganglia lacunar infarct new since the prior study from 3 days earlier  An MRI of the brain was completed on 7/21/18 with the following results:  IMPRESSION:     Subacute nonhemorrhagic lacunar infarction measuring up to 15 mm in greatest axial dimension and involving left posterior periventricular white matter and left posterior putamen  Onset of this lacunar infarction was probably between 2 and 4 days prior   to this examination  An MRA of the brain was completed on 7/21/18 with the following results:  IMPRESSION:     Apparent focal high-grade intracranial atherosclerotic stenosis at the junction of right P1 and P2 posterior cerebral artery segments  No other MRA evidence of large vessel intracranial cerebral vascular stenosis or occlusion      Anatomic variation including fetal origin of left posterior cerebral artery and markedly diminutive nondominant distal right vertebral artery  The patient was also diagnosed with an acute DVT of the right peroneal vein  The patient was initiated on the stroke pathway for the lacunar infarction  The patient was seen in consultation by Neurology and continues to require close neurologic status monitoring  She also continues to require further work-up for the lacunar infarction and for the right peroneal vein DVT    In addition, the patient was found to have hypomagnesemia and requires magnesium replacement therapy  She also continues to require serial laboratory testing to monitor her electrolyte levels  The patient is appropriate for an INPATIENT ADMISSION  The patients vitals on arrival were ED Triage Vitals   Temperature Pulse Respirations Blood Pressure SpO2   07/20/18 0210 07/20/18 0210 07/20/18 0210 07/20/18 0210 07/20/18 0210   97 5 °F (36 4 °C) 91 16 165/76 93 %      Temp Source Heart Rate Source Patient Position - Orthostatic VS BP Location FiO2 (%)   07/20/18 0210 07/20/18 0210 07/20/18 0210 07/20/18 0210 07/21/18 1300   Temporal Monitor Lying Left arm 2      Pain Score       07/21/18 0125       No Pain           Past Medical History:   Diagnosis Date    Ankle fracture     Dementia     Diabetes ([facility] [address] )     Hypertension     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[facility] [address] )     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HCC)      Past Surgical History:   Procedure Laterality Date    CARPAL TUNNEL RELEASE      MN OPEN RX FEMUR FX+INTRAMED RADHA Right 7/18/2018    Procedure: INSERTION NAIL IM FEMUR ANTEGRADE (TROCHANTERIC);   Surgeon: Catherine Villalpando MD;  Location: [facility];  Service: Orthopedics           Consults have been placed to:   IP CONSULT TO PHYSICAL MEDICINE REHAB  IP CONSULT TO CASE MANAGEMENT  IP CONSULT TO NUTRITION SERVICES  IP CONSULT TO NEUROLOGY    Vitals:    07/21/18 1254 07/21/18 1300 07/21/18 1600 07/21/18 2000   BP: 164/89 164/89 122/68 133/63   BP Location:  Left arm Left arm Left arm   Pulse:  88 77 77   Resp:  20 18 18   Temp:  98 1 °F (36 7 °C) (!) 97 °F (36 1 °C) (!) 97 4 °F (36 3 °C)   TempSrc:  Temporal Temporal Temporal   SpO2:  99% 98% 96%   Weight:       Height:           Most recent labs:    Recent Labs      07/20/18   2004  07/21/18   0435   07/21/18   1552  07/21/18   1553   WBC  11 30*  9 40   --    --    --    HGB  11 0*  10 0*   --    --    --    HCT  33 1*  29 6*   --    --    --    PLT  212  197   --    --    --    K  4 3  4 0   --    --    --    NA  138  138   -- --    --    CALCIUM  10 5*  10 2   --    --    --    BUN  19  17   --    --    --    CREATININE  0 48*  0 50*   --    --    --    INR   --   1 03   --    --    --    TROPONINI  <0 01   --    < >   --   <0 01   CKTOTAL   --    --    < >  51   --    AST  55*   --    --    --    --    ALT  36   --    --    --    --    ALKPHOS  76   --    --    --    --    BILITOT  0 70   --    --    --    --     < > = values in this interval not displayed         Scheduled Meds:  Current Facility-Administered Medications:  acetaminophen 650 mg Rectal Q6H PRN Brandi Abreu MD   amLODIPine 10 mg Oral Daily Kingston Solis MD   aspirin 81 mg Oral Daily Brandi Abreu MD   atorvastatin 40 mg Oral QPM Brandi Abreu MD   bromocriptine 2 5 mg Oral Daily Brandi Abreu MD   citalopram 10 mg Oral Daily Brandi Abreu MD   clopidogrel 75 mg Oral Daily Kingston Solis MD   cyanocobalamin 1,000 mcg Oral Daily Brandi Abreu MD   docusate sodium 100 mg Oral BID Brandi Abreu MD   enoxaparin 40 mg Subcutaneous Daily Brandi Abreu MD   ferrous sulfate 325 mg Oral Daily With Breakfast Kingston Solis MD   gabapentin 100 mg Oral HS Brandi Abreu MD   gadobenate dimeglumine 10 mL Intravenous Once in imaging Kingston Solis MD   insulin lispro 1-5 Units Subcutaneous TID AC Kingston Solis MD   labetalol 10 mg Intravenous Q6H PRN Brandi Abreu MD   lidocaine 1 patch Transdermal Daily Brandi Abreu MD   methylphenidate 10 mg Oral BID before breakfast/lunch Brandi Abreu MD   morphine injection 0 5 mg Intravenous Q4H PRN Kingston Solis MD   ondansetron 4 mg Intravenous Q6H PRN Brandi Abreu MD   oxyCODONE 2 5 mg Oral Q4H PRN Brandi Abreu MD   senna 1 tablet Oral Daily Brandi Abreu MD     Continuous Infusions:   PRN Meds:   acetaminophen    gadobenate dimeglumine    labetalol    morphine injection    ondansetron    oxyCODONE    Surgical procedures (if appropriate):

## 2018-07-22 NOTE — ASSESSMENT & PLAN NOTE
Supportive care  Frequent reorientation  Avoid unnecessary sedatives benzodiazepines pain medication she just underwent orthopedic surgery,   Passed swallow eval - start diet mechanical soft thin liquid  aaox2   Patient was previously on Aricept but not anymore   Patient is very sensitive to pain medications it is hard to arouse her in the morning I will actually DC and nighttime gabapentin  She is a minimal dose of narcotics of morphine and oxycodone code and p r n

## 2018-07-22 NOTE — NURSING NOTE
Pt awake alert, ox 2, eating dinner with assistance from her daughter  Appetite improved  VSS  SR on monitor  Right sided weakness improving  Mild right facial droop  Faint crackles to lung bases  Dressing to right hip dry and intact  Call bell in reach  Will continue to monitor

## 2018-07-22 NOTE — ASSESSMENT & PLAN NOTE
· Seems acute blood loss s/p orthopedic surgery   · She is on vitamin b12  · Fluids will be dced   · hemocult-pending  · Start iron   · Cbc in am-dropped to 9 5

## 2018-07-22 NOTE — NURSING NOTE
Pt sleeping ,easily aroused to verbal stimuli  VS stable  SR on monitor  Skin dry warm to touch  Trace edema to B/L ankle  SCD on to left leg  No new changes from previous assessment  Will continue to monitor  Call bell in reach

## 2018-07-22 NOTE — NURSING NOTE
Pt resting in bed comfortably  Assisted with lunch by son  Decreased apetite, but no difficulty swallowing  VSS  Moderate pain to right hip when palpated  Ice pack applied  Neuro unchanged from previous  Crackles to lung bases  Incontinent of bladder  Dressing to right hip dry and intact  Repositioned to back  Call bell in reach  Will continue to monitor

## 2018-07-22 NOTE — ASSESSMENT & PLAN NOTE
· 2/2 pulmonary edema and reviewed xray as well-shortness of breath has improved but she still has crackles more towards the lower lung fields, she is incontinent so the urine output cannot be adequately assessed but the weight went up since yesterday-will give another dose of Lasix but Lasix 40 IV x1 today and there since the portable chest x-ray    · Normal echo with ef back in 2015  · Now echo pending   · ProBNP is still pending  · EKG reviewed no acute ischemic changes troponins x2 were negative  · She is 97% on 1 5 L of oxygen

## 2018-07-22 NOTE — ASSESSMENT & PLAN NOTE
Patient was transferred to the ER  from  rehab on 7/21/18 after she found to have a right-sided weakness and right-sided facial droop ,I spoke to the patient's son,  Dr Jos Burnham, who can not identify exact onset of symptoms, however CT of the head obtained by ER attending report" Subacute left basal ganglia lacunar infarct new since the prior study from 3 days earlier"  According to the notes on July 17 patient was admitted to orthopedic service at [facility], status post fall and found to have a right hip fracture  CT of the head  on admission was negative for acute intracranial pathology  Patient successfully underwent  IM nail femur of intertrochanteric femur fracture on 7/18/18 by Nadia Stein and transferred to  rehab on 7/20  Upon arrival patient was evaluated by rehab specialist who found the patient has a right-sided weakness and slight right-sided facial droop so patient immediately was transferred to the emergency room rule out TIA stroke  According to the ER fellow neurology stroke alert has not been called and neurology was not conducted due to unknown time of onset of symptoms  CT scan reports subacute stoke  Will admit to stroke pathway, frequent neuro checks aspiration fall precaution - patient had nursing swallow assessment - will place on mechanical soft thin liquid diet    Obtain full stroke workup including MRI of the brain carotid Doppler echocardiogram- with titanium nail in hip unsure if mri can be done waiting for answer  The last hemoglobin A1c 8 1 will obtain lipid profile- ldl controled and lipid profile improved from previous  Official speech and swallow evaluation PT OT evaluation- speech is not here till Monday so nursing eval was done passed diet placed  Aspirin ,statin and plavix started as well discussed with son was agreeble upon, will be on dual antiplatelets for 3 weeks then back to asa- prior was not on as or statin  Continue Lovenox for DVT prophylaxis ,status post orthopedic surgery  Labetalol IV p r n  for systolic blood pressure above 190  Code status was discussed with the patient's son,  Eligio Sterling, who discussed with his sister and both confirmed that patient's status is DNR/ DNI  Code status changed from full to level 2  Dc iv fluids patient has pulmonary edema and one dose of lasix 20 mg iv x1 will be given   Full neurological exam is very difficult to obtain with her inability to fully follow my directions and also with poor strength due to also global lethargy  Discussed case with dr Deanne Morse as wel  Mri reviewed and mra as well- stenosis of pca- discussed with dr Lenin Medrano no further intervention besides antiplatelets and statin  Reviewed carotid ultrasound there is a 50% stenosis bilaterally withdrawal to plaque she will continue antiplatelets and statin-follow-up of patient repeat ultrasound in about 6 months  LoÃ[place]za right upper extremity weakness as more improvement today to 4 5 she has but did lift against gravity and some resistance today  A right facial droop is still evident mild right lower extremity unable to be examined because of the pain  · MRI of the brain result-Subacute nonhemorrhagic lacunar infarction measuring up to 15 mm in greatest axial dimension and involving left posterior periventricular white matter and left posterior putamen    Onset of this lacunar infarction was probably between 2 and 4 days prior  · She has echo pending tomorrow

## 2018-07-22 NOTE — ASSESSMENT & PLAN NOTE
Lab Results   Component Value Date    HGBA1C 8 1 (H) 07/18/2018       Recent Labs      07/21/18   1154  07/21/18   1540  07/21/18   2049  07/22/18   0616   POCGLU  152*  200*  152*  159*       Blood Sugar Average: Last 72 hrs:  (P) 162 last known hemoglobin A1c 8 1     Sugars controlled with just iss   Been controlled still is on diet started

## 2018-07-22 NOTE — NURSING NOTE
Pt incontinent of bladder  Changed and repositoned to left side  Bed in lowest position  Call bell in reach  Will continue to monitor

## 2018-07-23 ENCOUNTER — APPOINTMENT (INPATIENT)
Dept: NON INVASIVE DIAGNOSTICS | Facility: HOSPITAL | Age: 83
DRG: 064 | End: 2018-07-23
Payer: MEDICARE

## 2018-07-23 PROBLEM — K59.00 CONSTIPATION: Status: ACTIVE | Noted: 2018-07-23

## 2018-07-23 LAB
ANION GAP SERPL CALCULATED.3IONS-SCNC: 5 MMOL/L (ref 5–14)
BASOPHILS # BLD AUTO: 0.1 THOUSANDS/ΜL (ref 0–0.1)
BASOPHILS NFR BLD AUTO: 1 % (ref 0–1)
BUN SERPL-MCNC: 16 MG/DL (ref 5–25)
CALCIUM SERPL-MCNC: 10 MG/DL (ref 8.4–10.2)
CHLORIDE SERPL-SCNC: 103 MMOL/L (ref 97–108)
CO2 SERPL-SCNC: 28 MMOL/L (ref 22–30)
CREAT SERPL-MCNC: 0.49 MG/DL (ref 0.6–1.2)
EOSINOPHIL # BLD AUTO: 0.4 THOUSAND/ΜL (ref 0–0.4)
EOSINOPHIL NFR BLD AUTO: 6 % (ref 0–6)
ERYTHROCYTE [DISTWIDTH] IN BLOOD BY AUTOMATED COUNT: 15 %
GFR SERPL CREATININE-BSD FRML MDRD: 88 ML/MIN/1.73SQ M
GLUCOSE SERPL-MCNC: 152 MG/DL (ref 70–99)
GLUCOSE SERPL-MCNC: 168 MG/DL (ref 70–99)
GLUCOSE SERPL-MCNC: 173 MG/DL (ref 70–99)
GLUCOSE SERPL-MCNC: 192 MG/DL (ref 70–99)
GLUCOSE SERPL-MCNC: 228 MG/DL (ref 70–99)
HCT VFR BLD AUTO: 28.6 % (ref 36–46)
HGB BLD-MCNC: 9.5 G/DL (ref 12–16)
LYMPHOCYTES # BLD AUTO: 1.9 THOUSANDS/ΜL (ref 0.5–4)
LYMPHOCYTES NFR BLD AUTO: 29 % (ref 20–50)
MAGNESIUM SERPL-MCNC: 1.6 MG/DL (ref 1.6–2.3)
MCH RBC QN AUTO: 29.9 PG (ref 26.8–34.3)
MCHC RBC AUTO-ENTMCNC: 33.4 G/DL (ref 31.4–37.4)
MCV RBC AUTO: 90 FL (ref 82–98)
MONOCYTES # BLD AUTO: 0.6 THOUSAND/ΜL (ref 0.2–0.9)
MONOCYTES NFR BLD AUTO: 9 % (ref 1–10)
NEUTROPHILS # BLD AUTO: 3.6 THOUSANDS/ΜL (ref 1.8–7.8)
NEUTS SEG NFR BLD AUTO: 55 % (ref 45–65)
PLATELET # BLD AUTO: 197 THOUSANDS/UL (ref 150–450)
PMV BLD AUTO: 7.1 FL (ref 8.9–12.7)
POTASSIUM SERPL-SCNC: 3.9 MMOL/L (ref 3.6–5)
RBC # BLD AUTO: 3.19 MILLION/UL (ref 4–5.2)
SODIUM SERPL-SCNC: 136 MMOL/L (ref 137–147)
WBC # BLD AUTO: 6.5 THOUSAND/UL (ref 4.31–10.16)

## 2018-07-23 PROCEDURE — 97167 OT EVAL HIGH COMPLEX 60 MIN: CPT

## 2018-07-23 PROCEDURE — 93306 TTE W/DOPPLER COMPLETE: CPT | Performed by: INTERNAL MEDICINE

## 2018-07-23 PROCEDURE — 93971 EXTREMITY STUDY: CPT

## 2018-07-23 PROCEDURE — 97530 THERAPEUTIC ACTIVITIES: CPT

## 2018-07-23 PROCEDURE — G8987 SELF CARE CURRENT STATUS: HCPCS

## 2018-07-23 PROCEDURE — 83735 ASSAY OF MAGNESIUM: CPT | Performed by: FAMILY MEDICINE

## 2018-07-23 PROCEDURE — G8978 MOBILITY CURRENT STATUS: HCPCS

## 2018-07-23 PROCEDURE — 93971 EXTREMITY STUDY: CPT | Performed by: SURGERY

## 2018-07-23 PROCEDURE — 80048 BASIC METABOLIC PNL TOTAL CA: CPT | Performed by: FAMILY MEDICINE

## 2018-07-23 PROCEDURE — G8979 MOBILITY GOAL STATUS: HCPCS

## 2018-07-23 PROCEDURE — 99232 SBSQ HOSP IP/OBS MODERATE 35: CPT | Performed by: PSYCHIATRY & NEUROLOGY

## 2018-07-23 PROCEDURE — 82948 REAGENT STRIP/BLOOD GLUCOSE: CPT

## 2018-07-23 PROCEDURE — 99232 SBSQ HOSP IP/OBS MODERATE 35: CPT | Performed by: FAMILY MEDICINE

## 2018-07-23 PROCEDURE — 93799 UNLISTED CV SVC/PROCEDURE: CPT

## 2018-07-23 PROCEDURE — G8988 SELF CARE GOAL STATUS: HCPCS

## 2018-07-23 PROCEDURE — 93306 TTE W/DOPPLER COMPLETE: CPT

## 2018-07-23 PROCEDURE — 92610 EVALUATE SWALLOWING FUNCTION: CPT

## 2018-07-23 PROCEDURE — 85025 COMPLETE CBC W/AUTO DIFF WBC: CPT | Performed by: FAMILY MEDICINE

## 2018-07-23 PROCEDURE — 97163 PT EVAL HIGH COMPLEX 45 MIN: CPT

## 2018-07-23 RX ADMIN — MAGNESIUM HYDROXIDE 30 ML: 400 SUSPENSION ORAL at 11:43

## 2018-07-23 RX ADMIN — CLOPIDOGREL 75 MG: 75 TABLET, FILM COATED ORAL at 08:29

## 2018-07-23 RX ADMIN — ASPIRIN 81 MG 81 MG: 81 TABLET ORAL at 08:29

## 2018-07-23 RX ADMIN — SENNOSIDES 8.6 MG: 8.6 TABLET, FILM COATED ORAL at 08:30

## 2018-07-23 RX ADMIN — DOCUSATE SODIUM 100 MG: 100 CAPSULE, LIQUID FILLED ORAL at 17:59

## 2018-07-23 RX ADMIN — CYANOCOBALAMIN TAB 1000 MCG 1000 MCG: 1000 TAB at 08:30

## 2018-07-23 RX ADMIN — FERROUS SULFATE TAB 325 MG (65 MG ELEMENTAL FE) 325 MG: 325 (65 FE) TAB at 06:47

## 2018-07-23 RX ADMIN — BROMOCRIPTINE MESYLATE 2.5 MG: 2.5 TABLET ORAL at 08:31

## 2018-07-23 RX ADMIN — INSULIN LISPRO 1 UNITS: 100 INJECTION, SOLUTION INTRAVENOUS; SUBCUTANEOUS at 16:31

## 2018-07-23 RX ADMIN — ACETAMINOPHEN 650 MG: 325 TABLET ORAL at 19:10

## 2018-07-23 RX ADMIN — LIDOCAINE 1 PATCH: 50 PATCH CUTANEOUS at 19:10

## 2018-07-23 RX ADMIN — ENOXAPARIN SODIUM 40 MG: 100 INJECTION SUBCUTANEOUS at 08:31

## 2018-07-23 RX ADMIN — DOCUSATE SODIUM 100 MG: 100 CAPSULE, LIQUID FILLED ORAL at 08:29

## 2018-07-23 RX ADMIN — CITALOPRAM HYDROBROMIDE 10 MG: 10 TABLET ORAL at 08:30

## 2018-07-23 RX ADMIN — ATORVASTATIN CALCIUM 40 MG: 40 TABLET, FILM COATED ORAL at 17:59

## 2018-07-23 RX ADMIN — INSULIN LISPRO 1 UNITS: 100 INJECTION, SOLUTION INTRAVENOUS; SUBCUTANEOUS at 11:43

## 2018-07-23 RX ADMIN — AMLODIPINE BESYLATE 10 MG: 10 TABLET ORAL at 08:29

## 2018-07-23 RX ADMIN — METHYLPHENIDATE HYDROCHLORIDE 10 MG: 10 TABLET ORAL at 06:47

## 2018-07-23 RX ADMIN — METHYLPHENIDATE HYDROCHLORIDE 10 MG: 10 TABLET ORAL at 11:42

## 2018-07-23 RX ADMIN — INSULIN LISPRO 1 UNITS: 100 INJECTION, SOLUTION INTRAVENOUS; SUBCUTANEOUS at 06:47

## 2018-07-23 NOTE — ASSESSMENT & PLAN NOTE
Supportive care  Frequent reorientation  Avoid unnecessary sedatives benzodiazepines pain medication she just underwent orthopedic surgery,   Passed swallow eval - start diet mechanical soft thin liquid  aaox2   Patient was previously on Aricept but not anymore   Patient is very sensitive to pain medications it is hard to arouse her in the morning I will actually DC and nighttime gabapentin -was DC the day previously now the patient is fully awake conversational eating lethargy tremendously improved  She is a minimal dose of narcotics of morphine and oxycodone code and p r n

## 2018-07-23 NOTE — PLAN OF CARE
Problem: PHYSICAL THERAPY ADULT  Goal: Performs mobility at highest level of function for planned discharge setting  See evaluation for individualized goals  Treatment/Interventions: ADL retraining, Functional transfer training, LE strengthening/ROM, Therapeutic exercise, Endurance training, Cognitive reorientation, Patient/family training, Equipment eval/education, Bed mobility, Gait training, Spoke to nursing, Spoke to MD, Family, OT  Equipment Recommended: Other (Comment) (To be determined)       See flowsheet documentation for full assessment, interventions and recommendations  Outcome: Progressing  Prognosis: Fair  Problem List: Decreased strength, Decreased range of motion, Decreased endurance, Impaired balance, Decreased mobility, Decreased coordination, Decreased cognition, Impaired judgement, Decreased safety awareness, Impaired vision, Obesity, Decreased skin integrity, Orthopedic restrictions, Pain  Assessment: In summary, guiding factors including patient history, examination of body system(s), clinical presentation and clinical decision making were considered  Patient presents with comorbid conditions that impact function, context of current functional limitations as compared to the prior level of function, limited physical support, impaired prior level of function and recent hospital admission  Patient also presents with edema bilateral feet, c/o pain, skin integrity issues at right hip area, impaired cognition, safety awareness, strength/AROM bilateral LEs, bed mobility, transfers, endurance for activity, standing balance and gait abilities  Clinical presentation is unstable at this time  The assigned level of complexity is: high  Patient would benefit from continued PT treatment to address deficits as defined above and restore or maximize level of functional mobility with consistency   From PT/mobility standpoint, preliminary discharge recommendation would be: SNF level of rehab, in order to facilitate decrease burden of care, improve activity tolerance, improve ease of bed mobility and improve ease of transfers  Barriers to Discharge: Other (Comment) (Limited endurance, fear of falling)     Recommendation: Long-term skilled PT     PT - OK to Discharge:  (When medically cleared)    See flowsheet documentation for full assessment

## 2018-07-23 NOTE — PHYSICAL THERAPY NOTE
PT CANCELLATION    Received PT Evaluation and treat order  Chart reviewed  PT Evaluation/Treatment deferred at this time due to: patient off unit for test     Plan is to resume PT when available, willing to participate and medically cleared      Miracle Golden, PT, DPT

## 2018-07-23 NOTE — ASSESSMENT & PLAN NOTE
· Duplex was done yesterday will not treat, unless it extends more with a repeat duplex on Monday  I discussed with Dr Ambar Dai as well agrees upon this

## 2018-07-23 NOTE — ASSESSMENT & PLAN NOTE
Continue home blood pressure Rx  I also would like patient to avoid any hypotension like her blood pressures to be anywhere between 140s up to 150s -continue her Norvasc with holding parameters

## 2018-07-23 NOTE — PHYSICAL THERAPY NOTE
PHYSICAL THERAPY EVALUATION    Time In: 13:30  Time Out: 13:50  Total Time: 20 min  MRN: [id number]    Patient is an 80 y o  female evaluated by Physical Therapy s/p admit to [place] [address] on 7/20/2018 with admitting diagnosis(es): Abnormal gait [R26 9]  Weakness [R53 1]  CVA (cerebral vascular accident) ([address] ) [I63 9] and principal problem(s): Acute right-sided weakness  Patient Active Problem List   Diagnosis    Abnormal gait    Abnormal weight gain    Abnormal weight loss    Acromegalia ([address] )    Alopecia    Alzheimers disease    Arthritis    Bilateral impacted cerumen    Depression    Fatigue    Hypercholesterolemia    Hypertension    Osteopenia    Sleep disorder    Type 2 diabetes mellitus, with long-term current use of insulin (HCC)    Vitamin D deficiency    Candidal UTI (urinary tract infection)    RAZIA (obstructive sleep apnea)    Acute right-sided weakness    Accelerated hypertension    S/P ORIF (open reduction internal fixation) fracture    Anemia    Shortness of breath    Acute deep vein thrombosis (DVT) of right lower extremity (HCC)    Constipation     Please refer to H & P for further details  Past Medical History:   Diagnosis Date    Ankle fracture     Dementia     Diabetes ([address] )     Hypertension     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     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[facility])        Past Surgical History:   Procedure Laterality Date    CARPAL TUNNEL RELEASE      [place] OPEN RX FEMUR FX+INTRAMED RADHA Right 7/18/2018    Procedure: INSERTION NAIL IM FEMUR ANTEGRADE (TROCHANTERIC); Surgeon: Dheeraj Flores MD;  [facility]: [facility];  Service: Orthopedics       Current [facility] Stay: 3 day(s)    PT was consulted to assess patient's functional mobility and discharge needs  Ordered are PT Evaluation and treatment with activity level of: activity as tolerated  Chart reviewed  RN cleared patient for PT       Comorbidities affecting patient's physical performance at time of assessment include: DM, HTN, arthritis, dementia and depression  Personal factors affecting the patient at time of IE include: ambulating with assistive device, impaired cognition, history of fall(s), impaired safety awareness, visual impairments and depression  Please locate objective findings from PT assessment regarding body systems outlined below:     07/23/18 1421   Note Type   Note type Eval/Treat   Pain Assessment   Pain Assessment Alexander-Baker FACES   Alexander-Baker FACES Pain Rating 6   Pain Type Other (Comment)  (Faces/grimaces/moans with bed mobility)   Pain Location (Patient unable to localize)   Patient's Stated Pain Goal Other (Comment)  (Patient unable to localize pain or respond to this question)   Hospital Pain Intervention(s) Other (Comment); Medication (See MAR); Repositioned  Elex Bis RN made aware)   Home Living   Type of Home Assisted living; Other (Comment)  [facility]   Home Layout One level   Bathroom Shower/Tub Walk-in shower   [facility] Other (Comment); Hospital bed  (RW)   Additional Comments Per patient's D-I-L patient was at [facility] for amb with her RW (to dining room)   Prior Function   Level of [place] Needs assistance with ADLs and functional mobility; Needs assistance with IADLs   Lives With Other (Comment)  (Lives at [address] [place] [address])   Sarah Espinosa Help From Other (Comment)  ([facility] staff)   ADL Assistance Needs assistance  (Assist for showers)   IADLs Needs assistance   Falls in the last 6 months 1 to 4  (At least 1 on 7/17/18)   Restrictions/Precautions   RLE Weight Bearing Per Order WBAT   Other Precautions Cognitive;Aspiration; Fall Risk;Pain;Visual impairment   General   Additional Pertinent History Patient originally admitted to [place] [address] on 7/17/18 s/p fall   On 7/18/18 patient underwent insertion right trochanteric nail IM femur due to displaced intertrochanteric fracture right hip by Dr David Ortiz  Family/Caregiver Present Yes  (Patient's daughter-in-law)   Cognition   Overall Cognitive Status Impaired   Arousal/Participation Cooperative   Attention Difficulty attending to directions   Orientation Level Oriented to person;Oriented to place; Disoriented to time;Disoriented to situation   Following Commands Follows one step commands with increased time or repetition   RLE Assessment   RLE Assessment X  (Patient inconsistently following commands for AROM)   RLE Overall PROM   R Hip Flexion NT   R Knee Extension WFL; unable to follow commands for MMT   R Ankle Dorsiflexion To ~ neutral; unable to follow commands for MMT   LLE Assessment   LLE Assessment X  (Patient inconsistently following commands for AROM)   LLE Overall PROM   L Hip Flexion (WFL; unable to follow commands for MMT)   L Knee Extension WFL; unable to follow commands for MMT   L Ankle Dorsiflexion To ~ neutral; unable to follow commands for MMT   Bed Mobility   Supine to Sit 2  Maximal assistance   Additional items Assist x 1;Verbal cues; Increased time required;Comment;LE management  (Assist to trunk, LEs and for initial sitting balance)   Sit to Supine 2  Maximal assistance   Additional items Assist x 2; Increased time required; Other;LE management;Verbal cues; Comment  (Assist to trunk and LEs)   Transfers   Sit to Stand 3  Moderate assistance   Additional items Assist x 2; Increased time required;Verbal cues; Other  (TCs for bilateral hands on RW; secondary to weakness)   Stand to Sit 3  Moderate assistance   Additional items Assist x 2; Increased time required;Verbal cues; Other  (For balance and secondary to decreased eccentric control)   Ambulation/Elevation   Gait pattern Improper Weight shift  (Attempted, but unable despite max A of 2)   Gait Assistance Not tested   Assistive Device Rolling walker   Distance 0 ft   Balance   Static Sitting Fair -   Static Standing Poor   Dynamic Standing Poor   Endurance Deficit Endurance Deficit Yes   Endurance Deficit Description Limited endurance for activity; fearful   Activity Tolerance   Activity Tolerance Patient limited by fatigue;Patient limited by pain; Other (Comment)  (Fearful of falling)   Nurse Made Aware YesDot RN   Assessment   Prognosis Fair   Problem List Decreased strength;Decreased range of motion;Decreased endurance; Impaired balance;Decreased mobility; Decreased coordination;Decreased cognition; Impaired judgement;Decreased safety awareness; Impaired vision;Obesity; Decreased skin integrity;Orthopedic restrictions;Pain   Assessment In summary, guiding factors including patient history, examination of body system(s), clinical presentation and clinical decision making were considered  Patient presents with comorbid conditions that impact function, context of current functional limitations as compared to the prior level of function, limited physical support, impaired prior level of function and recent hospital admission  Patient also presents with edema bilateral feet, c/o pain, skin integrity issues at right hip area, impaired cognition, safety awareness, strength/AROM bilateral LEs, bed mobility, transfers, endurance for activity, standing balance and gait abilities  Clinical presentation is unstable at this time  The assigned level of complexity is: high  Patient would benefit from continued PT treatment to address deficits as defined above and restore or maximize level of functional mobility with consistency  From PT/mobility standpoint, preliminary discharge recommendation would be: SNF level of rehab, in order to facilitate decrease burden of care, improve activity tolerance, improve ease of bed mobility and improve ease of transfers  Barriers to Discharge Other (Comment)  (Limited endurance, fear of falling)   Goals   Patient Goals Patient did not express upon questioning  LTG Expiration Date 08/02/18   Long Term Goal #1 1   Patient will perform all bed mobility with min A of 1 in order to get in/out of bed  2  Patient will perform all functional transfers with min A of 1 in order to  decrease burden of care and transfer from one surface to another  3  Patient will ambulate with mod A of 1 x at least 3 ft with RW in order to safely ambulate from bed to commode  Treatment Day 1   Plan   Treatment/Interventions ADL retraining;Functional transfer training;LE strengthening/ROM; Therapeutic exercise; Endurance training;Cognitive reorientation;Patient/family training;Equipment eval/education; Bed mobility;Gait training;Spoke to nursing;Spoke to MD;Family;OT   PT Frequency Other (Comment); Once a day  (In 10 PT treatment sessions)   Recommendation   Recommendation Long-term skilled PT   Equipment Recommended Other (Comment)  (To be determined)   PT - OK to Discharge (When medically cleared)   Barthel Index   Feeding 5   Bathing 0   Grooming Score 0   Dressing Score 0   Bladder Score 0   Bowels Score 5   Toilet Use Score 0   Transfers (Bed/Chair) Score 5   Mobility (Level Surface) Score 0   Stairs Score 0   Barthel Index Score 15     Vitals: Seated and left UE BP = 122/70, Heart Rate = 93 bpm, SpO2 = 97%  on O2 via NC @ 1/2 L/min  PHYSICAL THERAPY TREATMENT NOTE    Time In: 13:50    Time Out: 14:21  Total Time: 31 min  MRN: [id number]    S: "I'm not tired", patient stated, despite closing her eyes upon return to supine with HOB elevated at end of session  O: Therapeutic Activities:   Bilateral rolling with dependence ; patient moaning but unable to specify why/area of possible pain  3 attempts to transfer from bed > bedside commode with OT; unable despite max A of 2 with/without RW, side approach and anterior approach  Patient expressing fear of falling, despite feeling she has to have a bowel movement   Sit < > stand with mod A of 2 secondary to weakness and for balance; limited weightbearing on right LE with inability to advance right LE     A: Patient with difficulty with standing and transfers; multiple attempts to pivot transfer onto bedside commode that were unsuccessful  P: Continue PT to work on strengthening LEs, ROM of LEs, sitting balance and transfers  Patient returned to supine with HOB elevated with pillow support at right; patient positioned with all needs, including call bell, within reach      Leighton Golden, PT, DPT

## 2018-07-23 NOTE — PLAN OF CARE
Problem: OCCUPATIONAL THERAPY ADULT  Goal: Performs self-care activities at highest level of function for planned discharge setting  See evaluation for individualized goals  Treatment Interventions: ADL retraining, Functional transfer training, UE strengthening/ROM, Activityengagement, Neuromuscular reeducation, Compensatory technique education, Equipment evaluation/education, Patient/family training          See flowsheet documentation for full assessment, interventions and recommendations  Limitation: Decreased ADL status, Decreased UE ROM, Decreased UE strength, Decreased cognition, Decreased endurance, Decreased self-care trans, Decreased high-level ADLs  Prognosis: Fair  Assessment: Pt admit from [facility] (there for rehab s/p ORIF R hip) with R sided weakness and R sided facial droop  Dx: Subacute L posterior putamen infarction  OT completed extensive review of pt's medical and social history  Pt with h/o dementia, DM, HTN, MEN 1, Pituitary abnormality, Hypercalcemia, ankle fx, and recent ORIF R hip s/p fall  Prior to admit was living at [facility] and required assist with ADLs and facility provided assist with IADLS  However, was able to ambulate using RW to/from dining room for meals  Pt presents to OT below baseline due to the following performance deficits: ROM; strength; pain; balance; stand tolerance; functional mobility; problem solving; awareness; coping; and self care  Therefore, pt would benefit from OT services to achieve optimal level of performance  Occupational performance areas to be addressed include: grooming, bathing, toileting, dressing, activity tolerance, functional mobility, and clothing management  Based on findings, pt is of high complexity, due to being total assist with all tasks and unable to transfer at this time  Recommend SNF placement for rehab  High-complexity eval x 25 mins+ 10 mins tx        OT Discharge Recommendation: Other (Comment) (SNF placement for extended rehab  )

## 2018-07-23 NOTE — NURSING NOTE
Pt sleeping ,easily aroused to verbal stimuli  VS stable  SR on monitor  Denies pain  Neuro assessment remains same  Skin dry warm to touch  No new changes from previous assessment  SCD's on to left leg  Right hip dressing dry intact    Will continue to monitor   Call bell in reach

## 2018-07-23 NOTE — NURSING NOTE
Patient's VS stable  Remains afebrile  Lungs; auscultated crackles in bases  Wound care was in to see patient's intact blister on right hip; assessed 3 5cm x 3cm ; Mepilex applied  Change dressing q 3 days  Repeat VDE of lower right extremity=negative  Patient is confused; oriented to person and place  Call light within reach

## 2018-07-23 NOTE — SPEECH THERAPY NOTE
Speech-Language Pathology Bedside Swallow Evaluation      Patient Name: Jimy Castillo    ZKCRY'N Date: 7/23/2018     Problem List  Patient Active Problem List   Diagnosis    Abnormal gait    Abnormal weight gain    Abnormal weight loss    Acromegalia ([address] )    Alopecia    Alzheimers disease    Arthritis    Bilateral impacted cerumen    Depression    Fatigue    Hypercholesterolemia    Hypertension    Osteopenia    Sleep disorder    Type 2 diabetes mellitus, with long-term current use of insulin (HCC)    Vitamin D deficiency    Candidal UTI (urinary tract infection)    RAZIA (obstructive sleep apnea)    Acute right-sided weakness    Accelerated hypertension    S/P ORIF (open reduction internal fixation) fracture    Anemia    Shortness of breath    Acute deep vein thrombosis (DVT) of right lower extremity (HCC)       Past Medical History  Past Medical History:   Diagnosis Date    Ankle fracture     Dementia     [facility]     Hypertension     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[facility]     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[facility])        Past Surgical History  Past Surgical History:   Procedure Laterality Date    CARPAL TUNNEL RELEASE      [place] OPEN RX FEMUR FX+INTRAMED RADHA Right 7/18/2018    Procedure: INSERTION NAIL IM FEMUR ANTEGRADE (TROCHANTERIC); Surgeon: Kinsey Mathis MD;  Location: [facility];  Service: Orthopedics       Summary   Pt presented with functional appearing oral and pharyngeal stage swallowing skills with materials administered today  Risk for Aspiration: Mild secondary to fluctuating arousal and alertness levels        Recommendations: mechanically altered/level 2 diet and thin liquids     Recommended Form of Meds: whole with puree     Aspiration precautions and compensatory swallowing strategies: upright posture, only feed when fully alert and small bites/sips          Current Medical Status  Pt is a 80 y o  female [facility]  [facility] with new onset of right sided weakness  Patient with recent fall at [facility] with hip fx  [facility] [address] [place] [address]  MRI shows new lacunar infarct  Past medical history:  DM, HTN and dementia  Please see H&P for details      Social/Education/Vocational Hx:  Pt lives in assisted living facility      Swallow Information   Current Risks for Dysphagia & Aspiration: CVA and decreased alertness     Current Symptoms/Concerns: fatigue     Current Diet: mechanically altered/level 2 diet and thin liquids      Baseline Diet: regular diet and thin liquids      Baseline Assessment   Behavior/Cognition: alert    Speech/Language Status: able to participate in basic conversation, able to follow commands inconsistently and limited verbal output    Patient Positioning: upright in bed    Pain Status/Interventions/Response to Interventions: No report of or nonverbal indications of pain  Swallow Mechanism Exam   Oral-motor structures and function are WNL for symmetry, strength, ROM & coordination  Facial: symmetrical  Labial: WFL  Lingual: WFL  Velum: symmetrical  Mandible: adequate ROM  Dentition: adequate  Vocal quality:clear/adequate     Consistencies Assessed and Performance   Consistencies Administered: thin liquids and mechanical soft solids  Specific materials administered included scrambled eggs and potatoes with thin liquids  Also assessed with medications whole with water and whole in applesauce  Oral Stage: WFL  Mastication was adequate with the materials administered today  Bolus formation and transfer were functional with no significant oral residue noted  No overt s/s reduced oral control  Pharyneal Stage: WFL  Swallow Mechanics:  Swallowing initiation appeared prompt  Laryngeal rise was palpated and judged to be within functional limits  No coughing, throat clearing, change in vocal quality or respiratory status noted today       Patient observed with a cough after intake of medication whole with thin liquids  No difficulty with meds whole in applesauce  Esophageal Concerns: none reported      Summary and Recommendations (see above)      Results Reviewed with: patient, MD and family     Treatment Recommended: Dysphagia therapy to assess tolerance with baseline diet of regular solids     Frequency of treatment: 3x week    Patient Stated Goal: None stated     Dysphagia Goals per SLP: Patient will tolerate trials of regular solids and thin liquids without overt s/s aspiration  Pt/Family Education: initiated  Pt and caregivers would benefit from/require continued education      Speech Therapy Prognosis   Prognosis: good    Prognosis Considerations: age, medical status and cognitive status

## 2018-07-23 NOTE — PROGRESS NOTES
Progress Note - Nii Gomes 9/22/1930, 80 y o  female MRN: [id number]    Unit/Bed#: 7T [facility] 701-02 Encounter: [id number]    Primary Care Provider: Kelley Jimenes MD   Date and time admitted to hospital: 7/20/2018  7:52 PM        Acute deep vein thrombosis (DVT) of right lower extremity [facility])   Assessment & Plan    · Duplex was done yesterday will not treat, unless it extends more with a repeat duplex on Monday  I discussed with Dr Burgess Wiley as well agrees upon this  Shortness of breath   Assessment & Plan    · 2/2 pulmonary edema and reviewed xray as well-shortness of breath has improved but she still has crackles more towards the lower lung fields, she is incontinent so the urine output cannot be adequately assessed but the weight went up since yesterday-will give another dose of Lasix but Lasix 40 IV x1 today and there since the portable chest x-ray -chest x-ray no acute pulmonary disease  Lungs at bases little crackly suspect atelectasis will provide incentive spirometer now that she is able to follow more commands  · Normal echo with ef back in 2015  · Now echo pending   · ProBNP is still pending  · EKG reviewed no acute ischemic changes troponins x2 were negative  · She is 98% on 1 L of oxygen        Anemia   Assessment & Plan    · Seems acute blood loss s/p orthopedic surgery   · She is on vitamin b12  · Fluids will be dced   · hemocult-pending  · Start iron   · Cbc in am-dropped to 9 5-> is did not drop further        S/P ORIF (open reduction internal fixation) fracture   Assessment & Plan    Continue PT OT DVT prophylaxis  Patient will need to continue rehab after stabilization of her acute health issues  lovenox for dvt prophylaxis  Pain medications ordered  Also mild swelling and pain when her b/l lower extremities will obtain vde stat-for nonocclusive right lower extremity peroneal vein DVT    On lovenox for dvt prophylaxis        Accelerated hypertension   Assessment & Plan    Continue home blood pressure Rx  I also would like patient to avoid any hypotension like her blood pressures to be anywhere between 140s up to 150s -continue her Norvasc with holding parameters  Vitamin D deficiency   Assessment & Plan    Continue vitamin D supplement        Type 2 diabetes mellitus, with long-term current use of insulin [facility])   Assessment & Plan    Lab Results   Component Value Date    HGBA1C 8 1 (H) 07/18/2018       Recent Labs      07/22/18   1107  07/22/18   1539  07/22/18   2055  07/23/18   0554   POCGLU  181*  177*  276*  168*       Blood Sugar Average: Last 72 hrs:  (P) 179 [id number] last known hemoglobin A1c 8 1     Sugars controlled with just iss   Been controlled still is on diet started         Hypercholesterolemia   Assessment & Plan    Continue statin  Lipid profile reviewed - ldl 80's         Alzheimers disease   Assessment & Plan    Supportive care  Frequent reorientation  Avoid unnecessary sedatives benzodiazepines pain medication she just underwent orthopedic surgery,   Passed swallow eval - start diet mechanical soft thin liquid  aaox2   Patient was previously on Aricept but not anymore   Patient is very sensitive to pain medications it is hard to arouse her in the morning I will actually DC and nighttime gabapentin -was DC the day previously now the patient is fully awake conversational eating lethargy tremendously improved  She is a minimal dose of narcotics of morphine and oxycodone code and p r n  * Acute right-sided weakness   Assessment & Plan    · Since stopping item gabapentin patient is fully awake today and actually pleasantly conversational able to follow directions more less lethargic, eating, she has more movement of her right hand with more resistance     · Patient was transferred to the ER  from [facility] on 7/21/18 after she found to have a right-sided weakness and right-sided facial droop ,I spoke to the patient's son,  Dr Eligio Sterling, who can not identify exact onset of symptoms, however CT of the head obtained by ER attending report" Subacute left basal ganglia lacunar infarct new since the prior study from 3 days earlier"  According to the notes on July 17 patient was admitted to orthopedic service at [facility], status post fall and found to have a right hip fracture  CT of the head  on admission was negative for acute intracranial pathology  Patient successfully underwent  IM nail femur of intertrochanteric femur fracture on 7/18/18 by Vandana Chowdhury and [facility]  Upon arrival patient was evaluated by rehab specialist who found the patient has a right-sided weakness and slight right-sided facial droop so patient immediately was transferred to the emergency room rule out TIA stroke  · CT scan reports subacute stoke  · The last hemoglobin A1c 8 1 will obtain lipid profile- ldl controled and lipid profile improved from previous    · Aspirin ,statin and plavix started as well discussed with son was agreeble upon, will be on dual antiplatelets for 3 weeks then back to asa- prior was not on as or statin  · Continue Lovenox for DVT prophylaxis ,status post orthopedic surgery    · Code status was discussed with the patient's son, Dr Alegrej luis Christal, who discussed with his sister and both confirmed that patient's status is DNR/ DNI  Code status changed from full to level 2      · Discussed case with dr Angelique Aranda as wel  · Mri reviewed and mra as well- stenosis of pca- discussed with dr Ambar Dai no further intervention besides antiplatelets and statin  · Reviewed carotid ultrasound there is a 50% stenosis bilaterally withdrawal to plaque she will continue antiplatelets and statin-follow-up of patient repeat ultrasound in about 6 months  · Hoke right upper extremity weakness as more improvement today to 4 5 she has but did lift against gravity and some resistance today    A right facial droop is still evident mild right lower extremity unable to be examined because of the pain  · MRI of the brain result-Subacute nonhemorrhagic lacunar infarction measuring up to 15 mm in greatest axial dimension and involving left posterior periventricular white matter and left posterior putamen  Onset of this lacunar infarction was probably between 2 and 4 days prior  · She has echo pending today              VTE Pharmacologic Prophylaxis:   Pharmacologic: Enoxaparin (Lovenox)  Mechanical VTE Prophylaxis in Place: Yes    Patient Centered Rounds: I have performed bedside rounds with nursing staff today  Discussions with Specialists or Other Care Team Provider: yes    Education and Discussions with Family / Patient: yes    Time Spent for Care: 30 minutes  More than 50% of total time spent on counseling and coordination of care as described above  Current Length of Stay: 3 day(s)    Current Patient Status: Inpatient   Certification Statement: The patient will continue to require additional inpatient hospital stay due to The [facility] for now awaiting her echo and repeat venous duplex  Discharge Plan:     Code Status: Level 3 - DNAR and DNI      Subjective:   Patient is seen and examined states shortness of breath is much better she is actually not short of breath there is no chest pain  Still no bowel movement denies any abdominal pain  Objective:     Vitals:   Temp (24hrs), Av 1 °F (36 2 °C), Min:96 4 °F (35 8 °C), Max:97 4 °F (36 3 °C)    HR:  [71-80] 71  Resp:  [16-20] 16  BP: (130-154)/(52-98) 154/67  SpO2:  [96 %-98 %] 98 %  Body mass index is 31 26 kg/m²  Input and Output Summary (last 24 hours): Intake/Output Summary (Last 24 hours) at 18 0942  Last data filed at 18 4921   Gross per 24 hour   Intake              360 ml   Output                0 ml   Net              360 ml       Physical Exam:     Physical Exam   Constitutional: She appears well-developed and well-nourished     HENT:   Head: Normocephalic and atraumatic  Eyes: EOM are normal  Pupils are equal, round, and reactive to light  Neck: Normal range of motion  Cardiovascular: Normal rate, regular rhythm and normal heart sounds  Pulmonary/Chest: Effort normal  She has rales (Lower lobes)  Abdominal: Soft  Bowel sounds are normal  She exhibits distension (Mild distention of the abdomen but soft absolutely nontender not an acute abdomen)  Musculoskeletal: Normal range of motion  She exhibits no tenderness  Neurological: She is alert  She has normal reflexes  Alert and oriented times to right arm is 4 5 and there is minimal right facial droop   Skin: Skin is warm  Psychiatric: She has a normal mood and affect  Additional Data:     Labs:      Results from last 7 days  Lab Units 07/23/18  0506   WBC Thousand/uL 6 50   HEMOGLOBIN g/dL 9 5*   HEMATOCRIT % 28 6*   PLATELETS Thousands/uL 197   NEUTROS PCT % 55   LYMPHS PCT % 29   MONOS PCT % 9   EOS PCT % 6       Results from last 7 days  Lab Units 07/23/18  0507  07/20/18  2004   SODIUM mmol/L 136*  < > 138   POTASSIUM mmol/L 3 9  < > 4 3   CHLORIDE mmol/L 103  < > 104   CO2 mmol/L 28  < > 27   BUN mg/dL 16  < > 19   CREATININE mg/dL 0 49*  < > 0 48*   CALCIUM mg/dL 10 0  < > 10 5*   TOTAL PROTEIN g/dL  --   --  7 1   BILIRUBIN TOTAL mg/dL  --   --  0 70   ALK PHOS U/L  --   --  76   ALT U/L  --   --  36   AST U/L  --   --  55*   GLUCOSE RANDOM mg/dL 152*  < > 141*   < > = values in this interval not displayed      Results from last 7 days  Lab Units 07/21/18  0435   INR  1 03       Results from last 7 days  Lab Units 07/23/18  0554 07/22/18  2055 07/22/18  1539 07/22/18  1107 07/22/18  0616 07/21/18  2049 07/21/18  1540 07/21/18  1154 07/21/18  0548 07/20/18  1620 07/20/18  1016 07/20/18  0752   POC GLUCOSE mg/dl 168* 276* 177* 181* 159* 152* 200* 152* 147* 117* 171* 173*       Results from last 7 days  Lab Units 07/18/18  0559   HEMOGLOBIN A1C % 8 1*         * I Have Reviewed All Lab Data Listed Above  * Additional Pertinent Lab Tests Reviewed: All Labs Within Last 24 Hours Reviewed    Imaging:    Imaging Reports Reviewed Today Include: yes  Imaging Personally Reviewed by Myself Includes:  no    Recent Cultures (last 7 days):           Last 24 Hours Medication List:     Current Facility-Administered Medications:  acetaminophen 650 mg Rectal Q6H PRN Sujatha Ruiz MD   acetaminophen 650 mg Oral Q6H PRN Gabriella Mckoy MD   amLODIPine 10 mg Oral Daily Gabriella Mckoy MD   aspirin 81 mg Oral Daily Sujatha Ruiz MD   atorvastatin 40 mg Oral QPM Sujatha Ruiz MD   bisacodyl 10 mg Oral Daily PRN Gabriella Mckoy MD   bisacodyl 10 mg Rectal Daily PRN Gabriella Mckoy MD   bromocriptine 2 5 mg Oral Daily Sujatha Ruiz MD   citalopram 10 mg Oral Daily Sujatha Ruiz MD   clopidogrel 75 mg Oral Daily Gabriella Mckoy MD   cyanocobalamin 1,000 mcg Oral Daily Sujatha Ruiz MD   docusate sodium 100 mg Oral BID Sujatha Ruiz MD   enoxaparin 40 mg Subcutaneous Daily Sujatha Ruiz MD   ferrous sulfate 325 mg Oral Daily With Breakfast Gabriella Mckoy MD   gadobenate dimeglumine 10 mL Intravenous Once in imaging Gabriella Mckoy MD   insulin lispro 1-5 Units Subcutaneous TID AC Gabriella Mckoy MD   labetalol 10 mg Intravenous Q6H PRN Sujatha Ruiz MD   lidocaine 1 patch Transdermal Daily Sujatha Ruiz MD   methylphenidate 10 mg Oral BID before breakfast/lunch Sujatha Ruiz MD   morphine injection 0 5 mg Intravenous Q4H PRN Gabriella Mckoy MD   ondansetron 4 mg Intravenous Q6H PRN Sujatha Ruiz MD   oxyCODONE 2 5 mg Oral Q4H PRN Sujatha Ruiz MD   senna 1 tablet Oral Daily Sujatha Ruiz MD        Today, Patient Was Seen By: Gabriella Mckoy MD    ** Please Note: Dictation voice to text software may have been used in the creation of this document   **

## 2018-07-23 NOTE — ASSESSMENT & PLAN NOTE
Lab Results   Component Value Date    HGBA1C 8 1 (H) 07/18/2018       Recent Labs      07/22/18   1107  07/22/18   1539  07/22/18 2055 07/23/18   0554   POCGLU  181*  177*  276*  168*       Blood Sugar Average: Last 72 hrs:  (P) 179 [id number] last known hemoglobin A1c 8 1     Sugars controlled with just iss   Been controlled still is on diet started

## 2018-07-23 NOTE — ASSESSMENT & PLAN NOTE
· Patient still had no BM despite Senokot and Colace she is on narcotics and she has been mobile will give her milk of Mag 30 mL p o  x1  Abdomen is benign little distended but not acute there is positive bowel sounds soft

## 2018-07-23 NOTE — ASSESSMENT & PLAN NOTE
· 2/2 pulmonary edema and reviewed xray as well-shortness of breath has improved but she still has crackles more towards the lower lung fields, she is incontinent so the urine output cannot be adequately assessed but the weight went up since yesterday-will give another dose of Lasix but Lasix 40 IV x1 today and there since the portable chest x-ray -chest x-ray no acute pulmonary disease  Lungs at bases little crackly suspect atelectasis will provide incentive spirometer now that she is able to follow more commands    · Normal echo with ef back in 2015  · Now echo pending   · ProBNP is still pending  · EKG reviewed no acute ischemic changes troponins x2 were negative  · She is 98% on 1 L of oxygen

## 2018-07-23 NOTE — ASSESSMENT & PLAN NOTE
· Seems acute blood loss s/p orthopedic surgery   · She is on vitamin b12  · Fluids will be dced   · hemocult-pending  · Start iron   · Cbc in am-dropped to 9 5-> is did not drop further

## 2018-07-23 NOTE — CONSULTS
Consultation - Virginia Prado 80 y o  female MRN: [id number]  Unit/Bed#: 7T [facility] 701-02 Encounter: [id number]    Assessment/Plan     Assessment:  Stroke with right hemiparesis  Right hip fracture and repair  Abnormal gait  Dementia  Right lower limb DVT  Multiple medical problems  Plan:  Proceed with rehabilitation therapies including physical therapy and occupational therapy and speech language pathology with goals of improving function following the recent stroke  Activity should include bathing and dressing and toileting and other functional activities to promote independence and neuromuscular return  Mobility including bed mobility and transfers and trial standing and balance and trial ambulation be appropriate as well the   Speech language pathology for cognitive and speech quality and swallow assessment in the face of the new stroke is also appropriate  The patient will need inpatient rehabilitation to her address these problems the for optimal improvement  She is able to tolerate 3 hours of therapy per day  Her level of medical acuity requires acute as compared to subacute rehabilitation  Stroke prophylaxis continues with anti-platelet agents and statin  She was seen by Neurology for recommendations in this regard    she also has the history of hip fracture with repair  She was allowed weight-bearing as tolerated  She will need analgesia but should avoid stronger narcotics because of a history of dementia and her age  Thus, pain relief with topical modalities as well as local care and Lidoderm patch and oral analgesic on the will be necessary  Therapies to improve her mobility in the face of the recent fracture will be needed  This will include teaching and training from bed mobility and transfers and standing and balance and attempts at ambulation        Regarding abnormal gait, she will need therapies to work with her regarding weight-bearing and utilizing the proper upper limb support the and teaching functional mobility in the face of her new impairments  He she has a history of dementia  The narcotics and other medications which could potentially increase her level of confusion most be avoided as much as possible  We should try to manage her pain with Tylenol and topical agents and local modalities as much as possible in order to avoid stronger narcotics  Re - orientation as tolerated should proceed  Regarding the DVT, she is on the anti-platelet agents and further management can proceed as directed and advised by Internal Medicine vascular surgery  Regarding her  multiple other problems  She add that is followed closely by the internal medicine team, managing her diabetes and high blood pressure and other issues  History of Present Illness   HPI:  Lenin Segal is a 80 y o  female who presents with right hip fracture and repair to [facility] on July 20  She had previously suffered a fall and underwent repair on or about July 18  At [facility], she was noted to be lethargic with right-sided weakness  She was discharged to the [address] [place] [address] Emergency Department  Subacute left hemisphere stroke was identified  Hospital course was also remarkable for right lower limb DVT, pulmonary edema, hypertension, and anemia  She has been stabilized medically with adjustments to her medications including reduction of the pain relievers which were likely contributing to her diminished mental acuity  ,  On my visit today, she complains of the pain and weakness in the right lower limb causing markedly diminished mobility  This problem has been present for about 5 days and is located in the right lower limb  Severity is severe  Pain is 8/10  It is worse with activity and less with rest   She also notes the weakness on the right side of the body  This has been present for several days as well  Severity is severe    It is more evident when she tries to use the right upper lower limbs for functional activities  She did participate in rehabilitation therapies earlier today  She required maximal assistance for mobility and self-care  Staff notes that the right side of the body is moving better than it had been 1 day ago  Inpatient consult to Physical Medicine Rehab  Consult performed by: Itzel Kilauea  Consult ordered by: Christie Hayes          Review of Systems   Unable to perform ROS: Dementia   Constitutional: Positive for activity change and fatigue  HENT: Negative for trouble swallowing  Respiratory: Negative for cough and shortness of breath  Cardiovascular: Positive for leg swelling  Negative for chest pain  Genitourinary: Negative for dysuria  Musculoskeletal: Positive for back pain and gait problem  Neurological: Positive for weakness  Historical Information   Past Medical History:   Diagnosis Date    Ankle fracture     Dementia     Diabetes ([facility] [address] )     Hypertension     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[address] )     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HCC)      Past Surgical History:   Procedure Laterality Date    CARPAL TUNNEL RELEASE      RI OPEN RX FEMUR FX+INTRAMED RADHA Right 7/18/2018    Procedure: INSERTION NAIL IM FEMUR ANTEGRADE (TROCHANTERIC); Surgeon: Austin Bran MD;  Location:  MAIN OR;  Service: Orthopedics     Social History   History   Alcohol Use No     History   Drug Use No     History   Smoking Status    Never Smoker   Smokeless Tobacco    Never Used       Home Setup:  She lives at an assisted living facility  Functional Status Prior to Admission:   She required minimal assistanc for bathing and dressing  She required supervision for walking with her rolling walker  Functional Status on Admission:   She is requirin maximal assistance or is dependent in all areas  History reviewed  No pertinent family history      Meds/Allergies   all current active meds have been reviewed  Allergies   Allergen Reactions    Amoxicillin     Ibuprofen        Objective   Vitals: Blood pressure 138/63, pulse 75, temperature 97 7 °F (36 5 °C), temperature source Temporal, resp  rate 16, height 4' 11" (1 499 m), weight 70 2 kg (154 lb 12 2 oz), SpO2 97 %, not currently breastfeeding  Invasive Devices     Peripheral Intravenous Line            Peripheral IV 07/20/18 Right Hand 2 days                Physical Exam   Constitutional: She appears well-developed and well-nourished  HENT:   Head: Normocephalic and atraumatic  Eyes: Conjunctivae and EOM are normal    Neck: Normal range of motion  Neck supple  Cardiovascular: Normal rate, regular rhythm and intact distal pulses  Pulmonary/Chest: Effort normal  She has rales  Abdominal: She exhibits distension  Musculoskeletal:   The left upper lower limbs have functional range of motion  On the right side, passive range is functional but active ranges impaired by the stroke  On the right lower limb, there is severe pain which impedes range of motion at the hip and the knee  The ankle has functional range  Distal pulses are present  There is mild edema on the right upper lower limbs  Neurological: She is alert  She is alert and follows instructions, consistent with her history of dementia  Cranial nerve examination is remarkable for right facial weakness  There is no definite visual field cut or neglect  Extraocular movements are present  She has functional vision in all fields in both eyes  Tongue is midline on protrusion  Motor strength is generally 4/5 on the left side  On the right, strength is 2/5 in synergy in the upper limb  Strength is pain inhibited in the right lower  Sensation is generally present  Muscle stretch reflexes could not be assessed because the patient did not adequately relax  Coordination was adequate for finger to chin were using the left hand  She is dependent for all functional mobility  Skin: Skin is warm and dry     Psychiatric: She has a normal mood and affect  Vitals reviewed  Lab Results: I have personally reviewed pertinent lab results  Imaging: I have personally reviewed pertinent films in PACS  EKG, Pathology, and Other Studies: I have personally reviewed pertinent reports  Code Status: Level 3 - DNAR and DNI  Advance Directive and Living Will:      Power of : Yes  POLST:      Counseling / Coordination of Care  Total floor / unit time spent today 45 minutes  Greater than 50% of total time was spent with the patient and / or family counseling and / or coordination of care  A description of the counseling / coordination of care:   explained that she will need therapies to improve the strength and mobility of the right upper and lower limbs following the stroke  I also explained the therapies will be needed to try to restore function to the right lower limb in regards to the recent hip fracture and repair  I explained that she will need exercises to her restore strength and endurance and also will need to participate in activities designed to promote return of the strength and sensation and function of the newly stroke affected part of her body  Training and learning new techniques to compensate for her deficits will also be necessary  This may be limited because of her dementia  The rehabilitation team will need to take all these issues into consideration  Adriel Munoz

## 2018-07-23 NOTE — PROGRESS NOTES
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  As per vascular surgery, no surgical intervention indicated  Patient to continue on statin and antiplatelet therapy  5   No significant extracranial stenosis by MRA or carotid ultrasound  Plan:  1  Continue 81 mg aspirin daily and clopidogrel 75 mg daily for now  Consider discontinuing clopidogrel after 3 weeks as patient was anti-platelet agent naive prior to event  2   Continue statin  3   Occupational and physical therapy and reconsideration for extended inpatient rehabilitation at [facility]  Case discussed with Dr Rivka Ascencio  Neurology will remain available to advise  Subjective: The patient is a 68-year-old female who was transferred to [facility] rehab on 07/21/2018 following a fall with resultant right hip fracture and repair performed at [place] [address]  She had significant right upper and right lower extremity weakness with right facial droop upon arrival to [facility], and was sent to [address] [place] [address] Emergency Department for further evaluation  She was found to have a subacute left posterior putamen/periventricular white matter infarction  She was unable to lift her right upper extremity against gravity, but today has been reported as appearing stronger and less lethargic    She was previously on narcotic pain medications and gabapentin (the gabapentin has been discontinued and the narcotic pain medications have been kept at a minimum)  Review of Systems   HENT: Negative  Eyes: Negative  Respiratory: Negative  Cardiovascular: Negative  Gastrointestinal: Negative  Endocrine: Negative  Genitourinary: Negative  Musculoskeletal:        Right hip pain secondary to recent surgery   Skin: Negative for rash  Allergic/Immunologic: Negative  Neurological: Positive for weakness (RUE, RLE)  As above  Hematological: Negative  Psychiatric/Behavioral: Positive for confusion (dementia at baseline)  Vitals: Blood pressure 154/67, pulse 81, temperature (!) 97 1 °F (36 2 °C), temperature source Temporal, resp  rate 18, height 4' 11" (1 499 m), weight 70 2 kg (154 lb 12 2 oz), SpO2 92 %, not currently breastfeeding  Physical Exam:   Patient is alert and pleasantly interactive  No obvious symbolic language difficulty or dysarthria  Oriented to her name and knows she is in the hospital   Incorrectly stated the year as 1999 (apparently at baseline as per her daughter-in-law at bedside)  Full visual fields by confrontation bilaterally  Right facial asymmetry noted  Extraocular movements intact  Accurate with finger-to-nose maneuvers bilaterally  Motor testing with 3+/5 shoulder abduction strength in the right upper extremity and 4/5 strength with right elbow flexion and extension  Full testable strength throughout the left upper extremity globally  Patient would not participate in lower extremity strength testing  She was diffusely mildly hyper-reflexic throughout the upper and lower extremities bilaterally, with the exception of bilaterally absent ankle reflexes  Toe responses appear to be downgoing bilaterally  Lab, Imaging and other studies:   I have personally reviewed pertinent reports    , CBC:   Results from last 7 days  Lab Units 07/23/18  0506 07/22/18  0441 07/21/18  0435   WBC Thousand/uL 6 50 7 90 9 40 RBC Million/uL 3 19* 3 13* 3 33*   HEMOGLOBIN g/dL 9 5* 9 5* 10 0*   HEMATOCRIT % 28 6* 27 8* 29 6*   MCV fL 90 89 89   PLATELETS Thousands/uL 197 212 197   , BMP/CMP:   Results from last 7 days  Lab Units 07/23/18  0507 07/22/18  0441 07/21/18  0435 07/20/18 2004 07/17/18  1435   SODIUM mmol/L 136* 136* 138 138  < > 134*   POTASSIUM mmol/L 3 9 4 1 4 0 4 3  < > 4 3   CHLORIDE mmol/L 103 103 102 104  < > 104   CO2 mmol/L 28 27 28 27  < > 24   ANION GAP mmol/L 5 6 8 7  < > 6   BUN mg/dL 16 21 17 19  < > 18   CREATININE mg/dL 0 49* 0 52* 0 50* 0 48*  < > 0 82   GLUCOSE RANDOM mg/dL 152* 155* 131* 141*  < > 250*   CALCIUM mg/dL 10 0 10 2 10 2 10 5*  < > 11 3*   AST U/L  --   --   --  55*  --  28   ALT U/L  --   --   --  36  --  30   ALK PHOS U/L  --   --   --  76  --  93   TOTAL PROTEIN g/dL  --   --   --  7 1  --  7 4   BILIRUBIN TOTAL mg/dL  --   --   --  0 70  --  0 41   EGFR ml/min/1 73sq m 88 86 87 88  < > 65   < > = values in this interval not displayed , Lipid Profile:   Results from last 7 days  Lab Units 07/21/18  0435   HDL mg/dL 36*   CHOLESTEROL mg/dL 153   LDL CALC mg/dL 82   TRIGLYCERIDES mg/dL 173*    I have personally reviewed pertinent reports  and I have personally reviewed pertinent films in PACS    Counseling / Coordination of Care  I spent a total of 25 mins with the patient with greater than 50% of that time spent counseling and coordinating her care, specifically discussing her diagnosis, additional tests, and discussing with team, as detailed above

## 2018-07-23 NOTE — NURSING NOTE
Pt resting comfortable in bed  VS stable  SR on monitor  No arrhythmia recorded on monitor  Tele order was ,  Hospitalist notified and verbal order obtained to d/c tele  Assessment remains unchanged  Will monitor  Call bell in reach

## 2018-07-23 NOTE — ASSESSMENT & PLAN NOTE
· Since stopping item gabapentin patient is fully awake today and actually pleasantly conversational able to follow directions more less lethargic, eating, she has more movement of her right hand with more resistance  · Patient was transferred [facility] ER  from [facility]18 after she found to have a right-sided weakness and right-sided facial droop ,I spoke to the patient's son,  Dr Lacy Guerrier, who can not identify exact onset of symptoms, however CT of the head obtained by ER attending report" Subacute left basal ganglia lacunar infarct new since the prior study from 3 days earlier"  According to the notes on July 17 patient was admitted to orthopedic service at [facility], status post fall and found to have a right hip fracture  CT of the head  on admission was negative for acute intracranial pathology  Patient successfully underwent  IM nail femur of intertrochanteric femur fracture on 7/18/18 by Balta Wei and transferred to [facility] on 7/20  Upon arrival patient was evaluated by rehab specialist who found the patient has a right-sided weakness and slight right-sided facial droop so patient immediately was transferred to the emergency room rule out TIA stroke  · CT scan reports subacute stoke  · The last hemoglobin A1c 8 1 will obtain lipid profile- ldl controled and lipid profile improved from previous    · Aspirin ,statin and plavix started as well discussed with son was agreeble upon, will be on dual antiplatelets for 3 weeks then back to asa- prior was not on as or statin  · Continue Lovenox for DVT prophylaxis ,status post orthopedic surgery    · Code status was discussed with the patient's son, Dr Lacy Guerrier, who discussed with his sister and both confirmed that patient's status is DNR/ DNI    Code status changed from full to level 2      · Discussed case with dr Padmaja Lee as wel  · Mri reviewed and mra as well- stenosis of pca- discussed with dr Elizbeth Leyden no further intervention besides antiplatelets and statin  · Reviewed carotid ultrasound there is a 50% stenosis bilaterally withdrawal to plaque she will continue antiplatelets and statin-follow-up of patient repeat ultrasound in about 6 months  · Camp right upper extremity weakness as more improvement today to 4 5 she has but did lift against gravity and some resistance today  A right facial droop is still evident mild right lower extremity unable to be examined because of the pain  · MRI of the brain result-Subacute nonhemorrhagic lacunar infarction measuring up to 15 mm in greatest axial dimension and involving left posterior periventricular white matter and left posterior putamen    Onset of this lacunar infarction was probably between 2 and 4 days prior  · She has echo pending today

## 2018-07-23 NOTE — PLAN OF CARE
Activity Intolerance/Impaired Mobility     Mobility/activity is maintained at optimum level for patient Progressing        Communication Impairment     Ability to express needs and understand communication Billy Rasmussen Discharge to home or other facility with appropriate resources Progressing        INFECTION - ADULT     Absence or prevention of progression during hospitalization Progressing     Absence of fever/infection during neutropenic period Progressing        Knowledge Deficit     Patient/family/caregiver demonstrates understanding of disease process, treatment plan, medications, and discharge instructions Progressing        Neurological Deficit     Neurological status is stable or improving Progressing        PAIN - ADULT     Verbalizes/displays adequate comfort level or baseline comfort level Progressing        Potential for Aspiration     Non-ventilated patient's risk of aspiration is minimized Progressing     Ventilated patient's risk of aspiration is minimized Progressing        Potential for Falls     Patient will remain free of falls Progressing        Prexisting or High Potential for Compromised Skin Integrity     Skin integrity is maintained or improved Progressing        SAFETY ADULT     Maintain or return to baseline ADL function Progressing     Maintain or return mobility status to optimal level Progressing

## 2018-07-23 NOTE — SOCIAL WORK
Informed by attending patient is medical clear, requested consultation for admission to [facility]  Dr Delon Meckel scheduled for an assessment  Patient was informed  This worker to follow

## 2018-07-23 NOTE — PLAN OF CARE
Problem: SLP ADULT - SWALLOWING, IMPAIRED  Goal: Initial SLP swallow eval performed  Outcome: Completed Date Met: 07/23/18

## 2018-07-24 ENCOUNTER — HOSPITAL ENCOUNTER (OUTPATIENT)
Facility: HOSPITAL | Age: 83
Discharge: NURSING, REHAB OR LONG-TERM CARE | End: 2018-08-13
Attending: PHYSICAL MEDICINE & REHABILITATION | Admitting: PHYSICAL MEDICINE & REHABILITATION
Payer: MEDICARE

## 2018-07-24 VITALS
HEIGHT: 59 IN | DIASTOLIC BLOOD PRESSURE: 58 MMHG | TEMPERATURE: 97.8 F | SYSTOLIC BLOOD PRESSURE: 124 MMHG | BODY MASS INDEX: 31.82 KG/M2 | WEIGHT: 157.85 LBS | HEART RATE: 74 BPM | OXYGEN SATURATION: 97 % | RESPIRATION RATE: 18 BRPM

## 2018-07-24 DIAGNOSIS — E83.52 HYPERCALCEMIA: Chronic | ICD-10-CM

## 2018-07-24 DIAGNOSIS — F02.80 ALZHEIMER'S DEMENTIA WITHOUT BEHAVIORAL DISTURBANCE, UNSPECIFIED TIMING OF DEMENTIA ONSET (HCC): Primary | Chronic | ICD-10-CM

## 2018-07-24 DIAGNOSIS — G30.1 LATE ONSET ALZHEIMER'S DISEASE WITHOUT BEHAVIORAL DISTURBANCE (HCC): Chronic | ICD-10-CM

## 2018-07-24 DIAGNOSIS — F02.80 LATE ONSET ALZHEIMER'S DISEASE WITHOUT BEHAVIORAL DISTURBANCE (HCC): Chronic | ICD-10-CM

## 2018-07-24 DIAGNOSIS — Z98.890 S/P ORIF (OPEN REDUCTION INTERNAL FIXATION) FRACTURE: ICD-10-CM

## 2018-07-24 DIAGNOSIS — G30.9 ALZHEIMER'S DEMENTIA WITHOUT BEHAVIORAL DISTURBANCE, UNSPECIFIED TIMING OF DEMENTIA ONSET (HCC): Primary | Chronic | ICD-10-CM

## 2018-07-24 DIAGNOSIS — Z87.81 S/P ORIF (OPEN REDUCTION INTERNAL FIXATION) FRACTURE: ICD-10-CM

## 2018-07-24 PROBLEM — R06.02 SHORTNESS OF BREATH: Status: RESOLVED | Noted: 2018-07-21 | Resolved: 2018-07-24

## 2018-07-24 PROBLEM — I50.33 ACUTE ON CHRONIC DIASTOLIC HEART FAILURE (HCC): Status: ACTIVE | Noted: 2018-07-24

## 2018-07-24 PROBLEM — I63.9 CVA (CEREBRAL VASCULAR ACCIDENT) (HCC): Status: ACTIVE | Noted: 2018-07-24

## 2018-07-24 LAB
1,25(OH)2D3 SERPL-MCNC: 78.7 PG/ML (ref 19.9–79.3)
GLUCOSE SERPL-MCNC: 147 MG/DL (ref 70–99)
GLUCOSE SERPL-MCNC: 161 MG/DL (ref 70–99)
GLUCOSE SERPL-MCNC: 168 MG/DL (ref 70–99)
GLUCOSE SERPL-MCNC: 190 MG/DL (ref 70–99)

## 2018-07-24 PROCEDURE — 97530 THERAPEUTIC ACTIVITIES: CPT

## 2018-07-24 PROCEDURE — 82948 REAGENT STRIP/BLOOD GLUCOSE: CPT

## 2018-07-24 PROCEDURE — 97110 THERAPEUTIC EXERCISES: CPT

## 2018-07-24 PROCEDURE — 92526 ORAL FUNCTION THERAPY: CPT

## 2018-07-24 PROCEDURE — 99239 HOSP IP/OBS DSCHRG MGMT >30: CPT | Performed by: FAMILY MEDICINE

## 2018-07-24 RX ORDER — ATORVASTATIN CALCIUM 40 MG/1
40 TABLET, FILM COATED ORAL EVERY EVENING
Status: DISCONTINUED | OUTPATIENT
Start: 2018-07-24 | End: 2018-08-13 | Stop reason: HOSPADM

## 2018-07-24 RX ORDER — ASPIRIN 81 MG/1
81 TABLET, CHEWABLE ORAL DAILY
Qty: 30 TABLET | Refills: 0 | Status: SHIPPED | OUTPATIENT
Start: 2018-07-25 | End: 2018-09-11 | Stop reason: SDUPTHER

## 2018-07-24 RX ORDER — FERROUS SULFATE 325(65) MG
325 TABLET ORAL
Qty: 30 TABLET | Refills: 0 | Status: SHIPPED | OUTPATIENT
Start: 2018-07-25 | End: 2018-09-11 | Stop reason: SDUPTHER

## 2018-07-24 RX ORDER — CLOPIDOGREL BISULFATE 75 MG/1
75 TABLET ORAL DAILY
Status: DISCONTINUED | OUTPATIENT
Start: 2018-07-25 | End: 2018-08-13 | Stop reason: HOSPADM

## 2018-07-24 RX ORDER — BISACODYL 10 MG
10 SUPPOSITORY, RECTAL RECTAL DAILY PRN
Status: DISCONTINUED | OUTPATIENT
Start: 2018-07-24 | End: 2018-08-13 | Stop reason: HOSPADM

## 2018-07-24 RX ORDER — BISACODYL 10 MG
10 SUPPOSITORY, RECTAL RECTAL DAILY PRN
Qty: 12 SUPPOSITORY | Refills: 0 | Status: SHIPPED | OUTPATIENT
Start: 2018-07-24

## 2018-07-24 RX ORDER — FERROUS SULFATE 325(65) MG
325 TABLET ORAL
Status: DISCONTINUED | OUTPATIENT
Start: 2018-07-25 | End: 2018-08-13 | Stop reason: HOSPADM

## 2018-07-24 RX ORDER — CITALOPRAM 20 MG/1
10 TABLET ORAL DAILY
Status: DISCONTINUED | OUTPATIENT
Start: 2018-07-25 | End: 2018-08-13 | Stop reason: HOSPADM

## 2018-07-24 RX ORDER — METHYLPHENIDATE HYDROCHLORIDE 5 MG/1
10 TABLET ORAL
Status: DISCONTINUED | OUTPATIENT
Start: 2018-07-25 | End: 2018-07-24

## 2018-07-24 RX ORDER — AMLODIPINE BESYLATE 10 MG/1
10 TABLET ORAL DAILY
Qty: 30 TABLET | Refills: 0 | Status: SHIPPED | OUTPATIENT
Start: 2018-07-25 | End: 2018-09-11 | Stop reason: SDUPTHER

## 2018-07-24 RX ORDER — ACETAMINOPHEN 325 MG/1
650 TABLET ORAL EVERY 6 HOURS PRN
Qty: 30 TABLET | Refills: 0 | Status: SHIPPED | OUTPATIENT
Start: 2018-07-24 | End: 2018-08-13 | Stop reason: HOSPADM

## 2018-07-24 RX ORDER — ATORVASTATIN CALCIUM 40 MG/1
40 TABLET, FILM COATED ORAL EVERY EVENING
Qty: 30 TABLET | Refills: 0
Start: 2018-07-24 | End: 2018-09-11 | Stop reason: SDUPTHER

## 2018-07-24 RX ORDER — LANOLIN ALCOHOL/MO/W.PET/CERES
1000 CREAM (GRAM) TOPICAL DAILY
Status: DISCONTINUED | OUTPATIENT
Start: 2018-07-25 | End: 2018-08-13 | Stop reason: HOSPADM

## 2018-07-24 RX ORDER — OXYCODONE HYDROCHLORIDE 5 MG/1
2.5 TABLET ORAL EVERY 4 HOURS PRN
Status: DISCONTINUED | OUTPATIENT
Start: 2018-07-24 | End: 2018-08-13 | Stop reason: HOSPADM

## 2018-07-24 RX ORDER — SENNOSIDES 8.6 MG
1 TABLET ORAL 2 TIMES DAILY
Status: DISCONTINUED | OUTPATIENT
Start: 2018-07-24 | End: 2018-07-26

## 2018-07-24 RX ORDER — OXYCODONE HYDROCHLORIDE 5 MG/1
2.5 TABLET ORAL EVERY 4 HOURS PRN
Qty: 30 TABLET | Refills: 0 | Status: SHIPPED | OUTPATIENT
Start: 2018-07-24 | End: 2018-08-13 | Stop reason: HOSPADM

## 2018-07-24 RX ORDER — METHYLPHENIDATE HYDROCHLORIDE 5 MG/1
5 TABLET ORAL
Status: DISCONTINUED | OUTPATIENT
Start: 2018-07-25 | End: 2018-08-13 | Stop reason: HOSPADM

## 2018-07-24 RX ORDER — AMLODIPINE BESYLATE 5 MG/1
10 TABLET ORAL DAILY
Status: DISCONTINUED | OUTPATIENT
Start: 2018-07-25 | End: 2018-08-13 | Stop reason: HOSPADM

## 2018-07-24 RX ORDER — ACETAMINOPHEN 325 MG/1
650 TABLET ORAL
Status: DISCONTINUED | OUTPATIENT
Start: 2018-07-24 | End: 2018-08-13 | Stop reason: HOSPADM

## 2018-07-24 RX ORDER — LIDOCAINE 50 MG/G
1 PATCH TOPICAL DAILY
Status: DISCONTINUED | OUTPATIENT
Start: 2018-07-25 | End: 2018-08-13

## 2018-07-24 RX ORDER — ASPIRIN 81 MG/1
81 TABLET, CHEWABLE ORAL DAILY
Status: DISCONTINUED | OUTPATIENT
Start: 2018-07-25 | End: 2018-08-13 | Stop reason: HOSPADM

## 2018-07-24 RX ORDER — CLOPIDOGREL BISULFATE 75 MG/1
75 TABLET ORAL DAILY
Qty: 30 TABLET | Refills: 0 | Status: SHIPPED | OUTPATIENT
Start: 2018-07-25 | End: 2019-07-10 | Stop reason: ALTCHOICE

## 2018-07-24 RX ORDER — BROMOCRIPTINE MESYLATE 2.5 MG/1
2.5 TABLET ORAL DAILY
Status: DISCONTINUED | OUTPATIENT
Start: 2018-07-24 | End: 2018-08-07

## 2018-07-24 RX ORDER — DOCUSATE SODIUM 100 MG/1
100 CAPSULE, LIQUID FILLED ORAL 2 TIMES DAILY
Status: DISCONTINUED | OUTPATIENT
Start: 2018-07-24 | End: 2018-08-13 | Stop reason: HOSPADM

## 2018-07-24 RX ORDER — SENNOSIDES 8.6 MG
1 TABLET ORAL DAILY
Status: DISCONTINUED | OUTPATIENT
Start: 2018-07-25 | End: 2018-07-24

## 2018-07-24 RX ADMIN — BROMOCRIPTINE MESYLATE 2.5 MG: 2.5 TABLET ORAL at 22:28

## 2018-07-24 RX ADMIN — SENNOSIDES 8.6 MG: 8.6 TABLET, FILM COATED ORAL at 08:41

## 2018-07-24 RX ADMIN — FERROUS SULFATE TAB 325 MG (65 MG ELEMENTAL FE) 325 MG: 325 (65 FE) TAB at 08:40

## 2018-07-24 RX ADMIN — DOCUSATE SODIUM 100 MG: 100 CAPSULE, LIQUID FILLED ORAL at 17:30

## 2018-07-24 RX ADMIN — ENOXAPARIN SODIUM 40 MG: 100 INJECTION SUBCUTANEOUS at 08:39

## 2018-07-24 RX ADMIN — ASPIRIN 81 MG 81 MG: 81 TABLET ORAL at 08:40

## 2018-07-24 RX ADMIN — ATORVASTATIN CALCIUM 40 MG: 40 TABLET, FILM COATED ORAL at 17:30

## 2018-07-24 RX ADMIN — INSULIN LISPRO 1 UNITS: 100 INJECTION, SOLUTION INTRAVENOUS; SUBCUTANEOUS at 06:05

## 2018-07-24 RX ADMIN — CITALOPRAM HYDROBROMIDE 10 MG: 10 TABLET ORAL at 08:40

## 2018-07-24 RX ADMIN — BISACODYL 10 MG: 10 SUPPOSITORY RECTAL at 12:12

## 2018-07-24 RX ADMIN — ACETAMINOPHEN 650 MG: 325 TABLET ORAL at 17:29

## 2018-07-24 RX ADMIN — LIDOCAINE 1 PATCH: 50 PATCH CUTANEOUS at 08:41

## 2018-07-24 RX ADMIN — INSULIN LISPRO 1 UNITS: 100 INJECTION, SOLUTION INTRAVENOUS; SUBCUTANEOUS at 11:36

## 2018-07-24 RX ADMIN — DOCUSATE SODIUM 100 MG: 100 CAPSULE, LIQUID FILLED ORAL at 08:40

## 2018-07-24 RX ADMIN — METHYLPHENIDATE HYDROCHLORIDE 10 MG: 10 TABLET ORAL at 06:05

## 2018-07-24 RX ADMIN — METHYLPHENIDATE HYDROCHLORIDE 10 MG: 10 TABLET ORAL at 11:36

## 2018-07-24 RX ADMIN — CYANOCOBALAMIN TAB 1000 MCG 1000 MCG: 1000 TAB at 08:41

## 2018-07-24 RX ADMIN — CLOPIDOGREL 75 MG: 75 TABLET, FILM COATED ORAL at 08:41

## 2018-07-24 RX ADMIN — BROMOCRIPTINE MESYLATE 2.5 MG: 2.5 TABLET ORAL at 08:42

## 2018-07-24 RX ADMIN — SENNOSIDES 8.6 MG: 8.6 TABLET, FILM COATED ORAL at 20:40

## 2018-07-24 NOTE — NURSING NOTE
Report called to Stephany García RN in [facility]  Pt  And family aware of transfer  Emmamarcelino Kim requesting to receive Pt  In roughly 20 minutes  CNA helping to pack up Pt  For transfer

## 2018-07-24 NOTE — PLAN OF CARE
Activity Intolerance/Impaired Mobility     Mobility/activity is maintained at optimum level for patient Progressing        Communication Impairment     Ability to express needs and understand communication Billy Rasmussen Discharge to home or other facility with appropriate resources Progressing        INFECTION - ADULT     Absence or prevention of progression during hospitalization Progressing        Knowledge Deficit     Patient/family/caregiver demonstrates understanding of disease process, treatment plan, medications, and discharge instructions Progressing        Neurological Deficit     Neurological status is stable or improving Progressing        PAIN - ADULT     Verbalizes/displays adequate comfort level or baseline comfort level Progressing        Potential for Aspiration     Non-ventilated patient's risk of aspiration is minimized Progressing     Ventilated patient's risk of aspiration is minimized Progressing        Potential for Falls     Patient will remain free of falls Progressing        Prexisting or High Potential for Compromised Skin Integrity     Skin integrity is maintained or improved Progressing        SAFETY ADULT     Maintain or return to baseline ADL function Progressing     Maintain or return mobility status to optimal level Progressing

## 2018-07-24 NOTE — PLAN OF CARE
Problem: SLP ADULT - SWALLOWING, IMPAIRED  Goal: Advance to least restrictive diet without signs or symptoms of aspiration for planned discharge setting  See evaluation for individualized goals  Patient will tolerate trials of regular solids and thin liquids without s/s aspiration  Outcome: Not Progressing  Continues with fatigue and prefers mechanical soft solids for ease of intake at this time

## 2018-07-24 NOTE — NURSING NOTE
Patient is in bed sleeping  Wakes up to verbal stimuli  PERRLA  Speech soft but clear  Right facial droop noted  Tongue midline  B/L hand grasp weak and greater on the right  No c/o pain  Lungs CTA and diminished at bases  No SOB  HR regular  Trace edema B/L LE  +PP  ABD distended NT with +BS  Changed for moderate amount inc of urine  Right hip dressing D&I  Repositioned for comfort  VSS  Call bell in reach  Will continue to monitor accordingly

## 2018-07-24 NOTE — PLAN OF CARE
Problem: PHYSICAL THERAPY ADULT  Goal: Performs mobility at highest level of function for planned discharge setting  See evaluation for individualized goals  Treatment/Interventions: ADL retraining, Functional transfer training, LE strengthening/ROM, Therapeutic exercise, Endurance training, Cognitive reorientation, Patient/family training, Equipment eval/education, Bed mobility, Gait training, Spoke to nursing, Spoke to MD, Family, OT  Equipment Recommended: Other (Comment) (To be determined)       See flowsheet documentation for full assessment, interventions and recommendations  Outcome: Progressing  Prognosis: Fair  Problem List: Decreased strength, Decreased range of motion, Decreased endurance, Impaired balance, Decreased mobility, Decreased coordination, Decreased cognition, Impaired judgement, Decreased safety awareness, Impaired vision, Obesity, Decreased skin integrity, Orthopedic restrictions, Pain  Assessment: Pts treatment limited by incontinence of urine and needing to be cleaned up  Pt not following commands for LE exercise  Pt did follow some commands during treatment  Pt requires max A for transfers  Barriers to Discharge: Other (Comment) (Limited endurance, fear of falling)     Recommendation: Long-term skilled PT     PT - OK to Discharge:  (When medically cleared)    See flowsheet documentation for full assessment

## 2018-07-24 NOTE — NURSING NOTE
Pt is new admission, report received from [address]  Pt arrived to floor at [address] vitals obtained  Pt verbally denied pain, she had occasional grimacing upon movement  She had small bowel movement on bedside commode--RN reported that she administered a suppository  Pt last reported BM was 7/17  She is on room air lungs are clear upper lobes, crackles noted in bilat lower lobes  Pt heart rate is regular, soft murmur noted  Generalized trace edema noted  R hip incisions covered with 4x4 and tegaderm, dressings are clean, dry, intact  Pt also has reported blister to R upper thigh, dressing is clean, dry, intact  Pt refused monitoring the incisional area, will attempt in the am  Pt is reported to be incontinent, she is resting in bed, cakll napier is in reach, bed in lowest position

## 2018-07-24 NOTE — ASSESSMENT & PLAN NOTE
Lab Results   Component Value Date    HGBA1C 8 1 (H) 07/18/2018       Recent Labs      07/23/18   1609  07/23/18   2037  07/24/18   0511  07/24/18   1109   POCGLU  192*  228*  168*  190*       Blood Sugar Average: Last 72 hrs:  (P) 032 [id number] last known hemoglobin A1c 8 1     Sugars controlled with just iss

## 2018-07-24 NOTE — ASSESSMENT & PLAN NOTE
· Patient's symptoms are improving her right upper extremity extremities she is able to raise it and touch her nose  More resistance  · Will continue aspirin and Plavix dual antiplatelet for 21 days, then will be on aspirin alone    Continue statin  · Has been evaluated Neurology  · Echo reviewed-no PFO  · Posterior circulation stenosis continue aspirin and Plavix for 21 days and statin discussed with the surgery vascular no other interventions  · Carotid less than 50% stenosis outpatient follow-up  · Transfer to acute rehab today

## 2018-07-24 NOTE — ASSESSMENT & PLAN NOTE
Continue PT OT DVT prophylaxis  Patient will need to continue rehab after stabilization of her acute health issues  lovenox for dvt prophylaxis  Pain medications ordered  Also mild swelling and pain when her b/l lower extremities will obtain vde stat-for nonocclusive right lower extremity peroneal vein DVT  -repeat PD done yesterday no acute DVT found in bilateral lower extremities    On lovenox for dvt prophylaxis

## 2018-07-24 NOTE — ASSESSMENT & PLAN NOTE
· Seems acute blood loss s/p orthopedic surgery   · She is on vitamin b12  · Fluids will be dced   · hemocult-pending-patient had no BM-as constipation milk of magnesia given 2 bolus will be given today abdomen is not acute consider milk of molasses enema 1 and rehab needs to ambulate  ·  iron   · Cbc in am-dropped to 9 5-> is did not drop further

## 2018-07-24 NOTE — DISCHARGE SUMMARY
Discharge- Shahriar Shah 9/22/1930, 80 y o  female MRN: [id number]    Unit/Bed#: 7T [facility] 707-02 Encounter: [id number]    Primary Care Provider: Ashlee Brunner MD   Date and time admitted to hospital: 7/20/2018  7:52 PM        * CVA (cerebral vascular accident) [facility])   Assessment & Plan    · Patient's symptoms are improving her right upper extremity extremities she is able to raise it and touch her nose  More resistance  · Will continue aspirin and Plavix dual antiplatelet for 21 days, then will be on aspirin alone  Continue statin  · Has been evaluated Neurology  · Echo reviewed-no PFO  · Posterior circulation stenosis continue aspirin and Plavix for 21 days and statin discussed with the surgery vascular no other interventions  · Carotid less than 50% stenosis outpatient follow-up  · Transfer to acute rehab today        Alzheimers disease   Assessment & Plan    Supportive care  Frequent reorientation  Avoid unnecessary sedatives benzodiazepines pain medication she just underwent orthopedic surgery,   Passed swallow eval - start diet mechanical soft thin liquid  [id number]   Patient was previously on Aricept but not anymore   Patient is very sensitive to pain medications it is hard to arouse her in the morning I will actually DC and nighttime gabapentin -was DC the day previously now the patient is fully awake conversational eating lethargy tremendously improved  She is a minimal dose of narcotics of morphine and oxycodone code and p r n          S/P ORIF (open reduction internal fixation) fracture   Assessment & Plan    Continue PT OT DVT prophylaxis  Patient will need to continue rehab after stabilization of her acute health issues  lovenox for dvt prophylaxis  Pain medications ordered  Also mild swelling and pain when her b/l lower extremities will obtain vde stat-for nonocclusive right lower extremity peroneal vein DVT  -repeat PD done yesterday no acute DVT found in bilateral lower extremities    On lovenox for dvt prophylaxis        Anemia   Assessment & Plan    · Seems acute blood loss s/p orthopedic surgery   · She is on vitamin b12  · Fluids will be dced   · hemocult-pending-patient had no BM-as constipation milk of magnesia given 2 bolus will be given today abdomen is not acute consider milk of molasses enema 1 and rehab needs to ambulate  ·  iron   · Cbc in am-dropped to 9 5-> is did not drop further        Acute on chronic diastolic heart failure (HCC)   Assessment & Plan    · Exam the patient was not are on arrival chest x-ray reviewed vascular congestion was given 2 doses of Lasix resolved without any shortness of breath able to set on room air well fine crackles bilateral lower lobe suspect atelectasis repeat chest x-ray negative  · At this point no need for any more diuresis  · Monitor weights        Constipation   Assessment & Plan    · Patient still had no BM despite Senokot and Colace she is on narcotics and she has been mobile will give her milk of Mag 30 mL p o  x1  Abdomen is benign little distended but not acute there is positive bowel sounds soft  -still had no BM abdomen is benign slightly distended but soft nontender positive bowel sounds will be given a Dulcolax suppository discussed needs to ambulate if in rehab still no BM consider a milk of molasses enema that time and stopping iron  Acute deep vein thrombosis (DVT) of right lower extremity (HCC)   Assessment & Plan    · Duplex was done yesterday will not treat, unless it extends more with a repeat duplex on Monday  I discussed with Dr Lorene Rogers as well agrees upon this -ruled out repeat the ROSANA done on 07/20 3-for acute DVT in the right and lower left lower extremity          Vitamin D deficiency   Assessment & Plan    Continue vitamin D supplement        Type 2 diabetes mellitus, with long-term current use of insulin [facility])   Assessment & Plan    Lab Results   Component Value Date    HGBA1C 8 1 (H) 07/18/2018       Recent Labs 07/23/18   1609  07/23/18   2037  07/24/18   0511  07/24/18   1109   POCGLU  192*  228*  168*  190*       Blood Sugar Average: Last 72 hrs:  (P) 284 [id number] last known hemoglobin A1c 8 1     Sugars controlled with just iss           Hypercholesterolemia   Assessment & Plan    Continue statin  Lipid profile reviewed - ldl 80's               Discharging Physician / Practitioner: Jolie Ahn MD  PCP: Austin Cabral MD  Admission Date:   Admission Orders     Ordered        07/20/18 2129  Inpatient Admission (expected length of stay for this patient is greater than two midnights)  Once             Discharge Date: 07/24/18    Resolved Problems  Date Reviewed: 7/24/2018          Resolved    Hypercalcemia 7/21/2018     Resolved by  Jolie Ahn MD    Leukocytosis 7/21/2018     Resolved by  Jolie Ahn MD    Shortness of breath 7/24/2018     Resolved by  Jolie Ahn MD    Overview Deleted 7/22/2018  8:23 AM by Jolie Ahn MD                  Consultations During Hospital Stay:  · Neurology  · [employer] rehab    Procedures Performed:     · None    Significant Findings / Test Results:     · CT head without contrast done on July 20, 2018-subacute left basal ganglia lacunar infarct new since the prior study from 3 days earlier  · Portable chest x-ray on 07/20 suspect mild cardiomegaly with mild pulmonary vascular congestion no definite new consolidation to suggest pneumonia  5 mm right lower lobe lung nodule for which meant nonemergent outpatient on has CT chest recommended  · MRI brain 721-subacute nonhemorrhagic lacunar infarction measuring up to 15 mm involving left posterior periventricular white matter and left posterior putamen  Onset of this lacunar infarction was probably between 2 and 4 days prior to this examination    · MRA of the head vessels-high-grade intracranial atherosclerotic stenosis of the junction of the right P1 and P2 posterior cerebral artery segment  · MRA of the carotid-somewhat technically difficult study but no convincing  evidence of flow restrictive disease in the neck  · Venous duplex done on 07/21-although technical study does seem to be in acute non in occluding deep vein thrombosis noted in the peroneal vein right lower limb  · Repeat venous duplex on 07/23 was negative for any acute DVT in the right upper lower left extremity  · The carotid duplex right less than 50% stenosis and left less than 50% stenosis  Bilateral plaque is heterogeneous irregular  · Chest x-ray on 07/20 -normal    Incidental Findings:   · 5 mm right lower lobe lung nodule for which meant nonemergent outpatient on has CT chest recommended      Test Results Pending at Discharge (will require follow up):   · none     Outpatient Tests Requested:  · none    Complications:  None    Reason for Admission:   Subacute CVa    Hospital Course:     Mayi Godinez is a 80 y o  female patient who originally presented to the hospital on 7/20/2018 due to right facial droop and right upper extremity weakness with CT evidence of subacute CVA  Patient was recently admitted to [facility] where she had a hip ORIF done secondary to fracture and transferred to [facility] for continued rehab  When arrived evaluated by Dr Anika Bland up he noticed there is a right facial droop and right upper extremity hemiparesis was transferred to the ER CT of the brain was done was found to have a subacute lacune a which was new prior to the CT done on admission into [facility]  Patient was admitted on stroke pathway to telemetry  MRI of the brain was done which confirmed a subacute stroke 15 mm    There is also on the MRA of the head vessels posterior circulation stenosis for which nothing need to be done further except antiplatelets and statin evaluation by Neurology recommendation aspirin and Plavix which was discussed with her son and agreed upon will be on dual antiplatelets for 21 days day and will be on aspirin continue statin prior to admission she was on no aspirin and statin  Patient was also found to have acute diastolic heart failure exacerbation as reviewed at echo and chest x-ray on admission required 2 doses of Lasix and shortness of breath and the fluid overload resolved  The patient was somnolent the 1st day she was here gabapentin at night was stopped and her medication pain medications were adjusted and patient remained alert and oriented in good spirits throughout the whole time  Patient's symptoms have improved her hemiparesis in the right upper extremity has significant improvement as she is able to raise her right upper extremity and touch her nose with her index finger  She also had postop anemia which peaked at 9 5 she was started on iron the patient has constipation since admission and she also did not ambulate and is on narcotic p r n  medication the abdomen is negative there is positive bowel sounds is no tenderness she was given p r n  medications and for now there is no bowel movement discussed with her she needs to start moving when she gets to acute rehab and consider of a milk of molasses enema and stopping of ferrous sulfate  If there is still no BM  Blood pressure was normal   Medical clear to be discharged to acute rehab    Please see above list of diagnoses and related plan for additional information  Condition at Discharge: stable     Discharge Day Visit / Exam:     Subjective:  Patient is seen and examined had a good breakfast denies any chest pain or shortness of breath  Still no BM but no abdominal pain and nausea vomiting    Vitals: Blood Pressure: 124/58 (07/24/18 0800)  Pulse: 74 (07/24/18 0800)  Temperature: 97 8 °F (36 6 °C) (07/24/18 1100)  Temp Source: Temporal (07/24/18 0800)  Respirations: 18 (07/24/18 0800)  Height: 4' 11" (149 9 cm) (07/21/18 0100)  Weight - Scale: 71 6 kg (157 lb 13 6 oz) (07/24/18 0600)  SpO2: 97 % (07/24/18 0800)  Exam:   Physical Exam Constitutional: She is oriented to person, place, and time  She appears well-developed and well-nourished  HENT:   Head: Normocephalic and atraumatic  Eyes: EOM are normal  Pupils are equal, round, and reactive to light  Neck: Normal range of motion  Neck supple  Cardiovascular: Normal rate, regular rhythm and normal heart sounds  Pulmonary/Chest: Effort normal  She has rales (Fine crackles in bilateral lower lobes)  Abdominal: Soft  Bowel sounds are normal    Musculoskeletal: Normal range of motion  Neurological: She is alert and oriented to person, place, and time  She has normal reflexes  She has mild right facial droop and her right upper extremities is a 4 5 and she is actually able to raise herself and touch her nose with an index finger   Skin: Skin is warm  Psychiatric: She has a normal mood and affect  Discussion with Family: yes    Discharge instructions/Information to patient and family:   See after visit summary for information provided to patient and family  Provisions for Follow-Up Care:  See after visit summary for information related to follow-up care and any pertinent home health orders  Disposition:     Acute Rehab at [facility]    For Discharges to [facility] SNF:   · Not Applicable to this Patient - Not Applicable to this Patient    Planned Readmission: no     Discharge Statement:  I spent greater than 35 minutes discharging the patient  This time was spent on the day of discharge  I had direct contact with the patient on the day of discharge  Greater than 50% of the total time was spent examining patient, answering all patient questions, arranging and discussing plan of care with patient as well as directly providing post-discharge instructions  Additional time then spent on discharge activities  Discharge Medications:  See after visit summary for reconciled discharge medications provided to patient and family        ** Please Note: This note has been constructed using a voice recognition system **

## 2018-07-24 NOTE — ASSESSMENT & PLAN NOTE
· Exam the patient was not are on arrival chest x-ray reviewed vascular congestion was given 2 doses of Lasix resolved without any shortness of breath able to set on room air well fine crackles bilateral lower lobe suspect atelectasis repeat chest x-ray negative  · At this point no need for any more diuresis    · Monitor weights

## 2018-07-24 NOTE — NURSING NOTE
Pt  Taken down on hospital bed to [facility] with transportation staff and daughter  All personal belongings accounted for  VSS at time of DC  No C/O pain,CP, or Sob

## 2018-07-24 NOTE — ASSESSMENT & PLAN NOTE
· Patient still had no BM despite Senokot and Colace she is on narcotics and she has been mobile will give her milk of Mag 30 mL p o  x1  Abdomen is benign little distended but not acute there is positive bowel sounds soft  -still had no BM abdomen is benign slightly distended but soft nontender positive bowel sounds will be given a Dulcolax suppository discussed needs to ambulate if in rehab still no BM consider a milk of molasses enema that time and stopping iron

## 2018-07-24 NOTE — SPEECH THERAPY NOTE
Speech/Language Pathology Progress Note    Patient Name: Latesha Moy  [id number] Date: 7/24/2018     Subjective:  Patient is asleep, but alerts to verbal cues  Patient's daughter and daughter in law are present  Report concerns with lack of BM  Passed to RN  Objective:  Patient independently able to enter upper partial  She is trialed with regular solids  SLP cuts into small pieces  The patient has difficulty with feeding herself, but is able to transfer from hand to mouth with left hand, with assistance from SLP  Mastication time is prolonged with regular solids  However, transfer is timely, with no oral residue  Patient fatigues quickly and prefers to continue with mechanical soft solids, when given choice  She has an additional 2 tsp mechanical soft solids and 1 sip of thin liquids  No signs of aspiration noted  Patient fatigues very quickly and reports no further interest in meal      Assessment:  Patient continues with lethargy and decreased PO intake  Plan/Recommendations:  Continue mechanical soft diet with thin liquids  ST will continue 3x week and trial and upgrade to baseline diet of regular when appropriate  Progress is pending patient's overall alertness level

## 2018-07-24 NOTE — ASSESSMENT & PLAN NOTE
· Duplex was done yesterday will not treat, unless it extends more with a repeat duplex on Monday  I discussed with Dr Marisa Singer as well agrees upon this -ruled out repeat the ROSANA done on 07/20 3-for acute DVT in the right and lower left lower extremity

## 2018-07-24 NOTE — PHYSICAL THERAPY NOTE
Physical Therapy treatment note  45 minute treatment        07/24/18 1135   Pain Assessment   Pain Assessment No/denies pain  (Pt denied pain, but grimacing w/ ROM)   Restrictions/Precautions   RLE Weight Bearing Per Order WBAT   General   Chart Reviewed Yes   Family/Caregiver Present Yes   Cognition   Overall Cognitive Status Impaired   Subjective   Subjective Pt nodidng yes and no  Pt stated "Hi" to this PTA   Bed Mobility   Rolling R 2  Maximal assistance   Additional items Assist x 2;Bedrails   Rolling L 2  Maximal assistance   Additional items Assist x 2;Bedrails   Supine to Sit 2  Maximal assistance   Additional items Assist x 1;HOB elevated; Bedrails   Sit to Supine 1  Dependent   Additional items Assist x 1   Transfers   Sit to Stand (Not atttempted 2*noting pt was soiled & needed cleaning)   Balance   Static Sitting Fair -   Endurance Deficit   Endurance Deficit Yes   Endurance Deficit Description (limited by pain and fatigue)   Activity Tolerance   Activity Tolerance Patient limited by pain; Patient limited by fatigue;Treatment limited secondary to medical complications (Comment)   Exercises   Heelslides 10 reps;PROM; Bilateral   Hip Abduction PROM;10 reps;Bilateral   Knee AROM Short Arc Quad PROM;10 reps;Bilateral   Assessment   Prognosis Fair   Problem List Decreased strength;Decreased range of motion;Decreased endurance; Impaired balance;Decreased mobility; Decreased coordination;Decreased cognition; Impaired judgement;Decreased safety awareness; Impaired vision;Obesity; Decreased skin integrity;Orthopedic restrictions;Pain   Assessment Pts treatment limited by incontinence of urine and needing to be cleaned up  Pt not following commands for LE exercise  Pt did follow some commands during treatment  Pt requires max A for transfers     Goals   Patient Goals Pt did not state   LTG Expiration Date 08/02/18   Treatment Day 2   Plan   Treatment/Interventions (Continue per plan of care)   PT Frequency Once a day  (10 treatment sessions)   Recommendation   Recommendation Long-term skilled PT   Equipment Recommended (to be determined)   Mirlande Huber, PTA

## 2018-07-25 LAB
GLUCOSE SERPL-MCNC: 150 MG/DL (ref 70–99)
GLUCOSE SERPL-MCNC: 159 MG/DL (ref 70–99)
GLUCOSE SERPL-MCNC: 194 MG/DL (ref 70–99)

## 2018-07-25 PROCEDURE — 82948 REAGENT STRIP/BLOOD GLUCOSE: CPT

## 2018-07-25 PROCEDURE — 99213 OFFICE O/P EST LOW 20 MIN: CPT | Performed by: INTERNAL MEDICINE

## 2018-07-25 RX ADMIN — BROMOCRIPTINE MESYLATE 2.5 MG: 2.5 TABLET ORAL at 21:40

## 2018-07-25 RX ADMIN — INSULIN LISPRO 1 UNITS: 100 INJECTION, SOLUTION INTRAVENOUS; SUBCUTANEOUS at 16:59

## 2018-07-25 RX ADMIN — METHYLPHENIDATE HYDROCHLORIDE 5 MG: 5 TABLET ORAL at 05:59

## 2018-07-25 RX ADMIN — Medication 1000 MCG: at 09:13

## 2018-07-25 RX ADMIN — ACETAMINOPHEN 650 MG: 325 TABLET ORAL at 12:07

## 2018-07-25 RX ADMIN — INSULIN LISPRO 1 UNITS: 100 INJECTION, SOLUTION INTRAVENOUS; SUBCUTANEOUS at 12:05

## 2018-07-25 RX ADMIN — CLOPIDOGREL 75 MG: 75 TABLET, FILM COATED ORAL at 09:13

## 2018-07-25 RX ADMIN — ACETAMINOPHEN 650 MG: 325 TABLET ORAL at 16:56

## 2018-07-25 RX ADMIN — LIDOCAINE 1 PATCH: 50 PATCH CUTANEOUS at 09:14

## 2018-07-25 RX ADMIN — CITALOPRAM HYDROBROMIDE 10 MG: 20 TABLET ORAL at 09:12

## 2018-07-25 RX ADMIN — DOCUSATE SODIUM 100 MG: 100 CAPSULE, LIQUID FILLED ORAL at 07:52

## 2018-07-25 RX ADMIN — ACETAMINOPHEN 650 MG: 325 TABLET ORAL at 07:45

## 2018-07-25 RX ADMIN — ASPIRIN 81 MG 81 MG: 81 TABLET ORAL at 09:12

## 2018-07-25 RX ADMIN — AMLODIPINE BESYLATE 10 MG: 5 TABLET ORAL at 07:54

## 2018-07-25 RX ADMIN — ENOXAPARIN SODIUM 40 MG: 40 INJECTION SUBCUTANEOUS at 09:13

## 2018-07-25 RX ADMIN — FERROUS SULFATE TAB 325 MG (65 MG ELEMENTAL FE) 325 MG: 325 (65 FE) TAB at 07:50

## 2018-07-25 RX ADMIN — DOCUSATE SODIUM 100 MG: 100 CAPSULE, LIQUID FILLED ORAL at 18:03

## 2018-07-25 RX ADMIN — ATORVASTATIN CALCIUM 40 MG: 40 TABLET, FILM COATED ORAL at 18:03

## 2018-07-25 RX ADMIN — BISACODYL 5 MG: 5 TABLET, COATED ORAL at 12:09

## 2018-07-25 RX ADMIN — SENNOSIDES 8.6 MG: 8.6 TABLET, FILM COATED ORAL at 07:52

## 2018-07-25 RX ADMIN — SENNOSIDES 8.6 MG: 8.6 TABLET, FILM COATED ORAL at 18:03

## 2018-07-25 NOTE — CONSULTS
VTE Prophylaxis: Enoxaparin (Lovenox)  / sequential compression device     Recommendations for Discharge:  · For Dr Eddie Cortez / Coordination of Care Time: 45 minutes  Greater than 50% of total time spent on patient counseling and coordination of care  Collaboration of Care: Were Recommendations Directly Discussed with Primary Treatment Team? - Yes     History of Present Illness:    Monica Sarkar is a 80 y o  female who is originally admitted to the  rehab acute stroke service due to acute stroke  We are consulted for  medical management  For multiple medical problems patient with hypertension diabetes  Diastolic heart failure, obesity, right hip fracture, dementia  Review of Systems:    Review of Systems   Constitutional: Negative for appetite change, chills, fatigue and fever  HENT: Negative for hearing loss, sore throat and trouble swallowing  Eyes: Negative for photophobia, discharge and visual disturbance  Respiratory: Negative for chest tightness and shortness of breath  Cardiovascular: Negative for chest pain and palpitations  Gastrointestinal: Negative for abdominal pain, blood in stool and vomiting  Endocrine: Negative for polydipsia and polyuria  Genitourinary: Negative for difficulty urinating, dysuria, flank pain and hematuria  Musculoskeletal: Positive for gait problem  Negative for back pain  Skin: Negative for rash  Allergic/Immunologic: Negative for environmental allergies and food allergies  Neurological: Positive for speech difficulty and weakness  Negative for dizziness, seizures, syncope and headaches  Hematological: Does not bruise/bleed easily  Psychiatric/Behavioral: Positive for confusion and decreased concentration  Negative for behavioral problems  All other systems reviewed and are negative        Past Medical and Surgical History:     Past Medical History:   Diagnosis Date    Ankle fracture     Arthritis     Dementia     [facility] [address] [facility]     Hypertension     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 [address] [facility]     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[facility] )     Stroke [facility])        Past Surgical History:   Procedure Laterality Date    CARPAL TUNNEL RELEASE      FRACTURE SURGERY      ID OPEN RX FEMUR FX+INTRAMED RADHA Right 7/18/2018    Procedure: INSERTION NAIL IM FEMUR ANTEGRADE (TROCHANTERIC); Surgeon: Azael Harvey MD;  Location: [facility];  Service: Orthopedics       Meds/Allergies:    all medications and allergies reviewed    Allergies: Allergies   Allergen Reactions    Amoxicillin     Ibuprofen        Social History:     Marital Status:     Substance Use History:   History   Alcohol Use No     History   Smoking Status    Former Smoker    Packs/day: 1 00    Types: Cigarettes    Quit date: 7/24/1979   Smokeless Tobacco    Never Used     History   Drug Use No       Family History:    non-contributory        Vitals:   Blood Pressure: 141/66 (07/25/18 1610)  Pulse: 71 (07/25/18 1610)  Temperature: 97 7 °F (36 5 °C) (07/25/18 1610)  Temp Source: Temporal (07/25/18 1610)  Respirations: 20 (07/25/18 1610)  Height: 4' 11" (149 9 cm) (07/25/18 0020)  Weight - Scale: 71 6 kg (157 lb 13 oz) (71 6 kg) (07/24/18 1423)  SpO2: 96 % (07/25/18 1610)    Physical Exam   Constitutional: She appears well-developed and well-nourished  HENT:   Head: Normocephalic and atraumatic  Right Ear: External ear normal    Left Ear: External ear normal    Mouth/Throat: Oropharynx is clear and moist    Eyes: Conjunctivae and EOM are normal  Pupils are equal, round, and reactive to light  Neck: Normal range of motion  Neck supple  Cardiovascular: Normal rate, regular rhythm, normal heart sounds and intact distal pulses  Pulmonary/Chest: Effort normal and breath sounds normal    Abdominal: Soft  Bowel sounds are normal  She exhibits no mass  There is no tenderness  There is no rebound and no guarding     Obese   Genitourinary:   Genitourinary Comments: deferred Musculoskeletal: Normal range of motion  Neurological: She exhibits abnormal muscle tone  Coordination abnormal    Patient with right side weakness to CVA but able to move her right arm strength 3/5 right leg also 3 out of 5 patient is not cooperative difficult to do sensory   Skin: Skin is warm and dry  No rash noted  Psychiatric: She has a normal mood and affect  Nursing note and vitals reviewed  Additional Data:     Lab Results: I have personally reviewed pertinent reports  Results from last 7 days  Lab Units 07/23/18  0506   WBC Thousand/uL 6 50   HEMOGLOBIN g/dL 9 5*   HEMATOCRIT % 28 6*   PLATELETS Thousands/uL 197   NEUTROS PCT % 55   LYMPHS PCT % 29   MONOS PCT % 9   EOS PCT % 6       Results from last 7 days  Lab Units 07/23/18  0507  07/20/18  2004   SODIUM mmol/L 136*  < > 138   POTASSIUM mmol/L 3 9  < > 4 3   CHLORIDE mmol/L 103  < > 104   CO2 mmol/L 28  < > 27   BUN mg/dL 16  < > 19   CREATININE mg/dL 0 49*  < > 0 48*   CALCIUM mg/dL 10 0  < > 10 5*   TOTAL PROTEIN g/dL  --   --  7 1   BILIRUBIN TOTAL mg/dL  --   --  0 70   ALK PHOS U/L  --   --  76   ALT U/L  --   --  36   AST U/L  --   --  55*   GLUCOSE RANDOM mg/dL 152*  < > 141*   < > = values in this interval not displayed      Results from last 7 days  Lab Units 07/21/18  0435   INR  1 03       Results from last 7 days  Lab Units 07/21/18  1553 07/21/18  1310 07/21/18  0847   TROPONIN I ng/mL <0 01 <0 01 <0 01     Lab Results   Component Value Date/Time    HGBA1C 8 1 (H) 07/18/2018 05:59 AM    HGBA1C 7 7 (H) 05/12/2017 09:24 AM    HGBA1C 7 4 (H) 11/14/2016 08:25 AM    HGBA1C 6 3 (H) 08/17/2015 09:35 AM    HGBA1C 6 3 (H) 02/27/2015 09:31 AM    HGBA1C 5 6 10/16/2014 09:54 AM       Results from last 7 days  Lab Units 07/25/18  1630 07/25/18  1159 07/25/18  0606 07/24/18  1644 07/24/18  1109 07/24/18  0511 07/23/18  2037 07/23/18  1609 07/23/18  1120 07/23/18  0554 07/22/18 2055 07/22/18  1539   POC GLUCOSE mg/dl 150* 194* 159* 147* 190* 168* 228* 192* 173* 168* 276* 177*       Imaging: I have personally reviewed pertinent reports  No orders to display       EKG, Pathology, and Other Studies Reviewed on Admission:   · EKG:   Normal sinus rhythm    ** Please Note: This note has been constructed using a voice recognition system  **    Consult- Marisela Nixon 9/22/1930, 80 y o  female MRN: [id number]    Unit/Bed#: [facility] 269-02 Encounter: [id number]    Primary Care Provider: Rico Sales MD   Date and time admitted to hospital: 7/24/2018  2:00 PM      Consults    Hypertension   Assessment & Plan    Continue home blood pressure Rx  I also would like patient to avoid any hypotension like her blood pressures to be anywhere between 140s up to 150s -continue her Norvasc with holding parameters  Acute on chronic diastolic heart failure (HCC)   Assessment & Plan    · Exam the patient was not are on arrival chest x-ray reviewed vascular congestion was given 2 doses of Lasix resolved without any shortness of breath able to set on room air well fine crackles bilateral lower lobe suspect atelectasis repeat chest x-ray negative  · At this point no need for any more diuresis  · Monitor weights        CVA (cerebral vascular accident) [facility])   Assessment & Plan    · Patient's symptoms are improving her right upper extremity extremities she is able to raise it and touch her nose  More resistance  · Will continue aspirin and Plavix dual antiplatelet for 21 days, then will be on aspirin alone    Continue statin  · Has been evaluated Neurology  · Echo reviewed-no PFO  · Posterior circulation stenosis continue aspirin and Plavix for 21 days and statin discussed with the surgery vascular no other interventions  · Carotid less than 50% stenosis outpatient follow-up  · Encourage rehab in [facility]         S/P ORIF (open reduction internal fixation) fracture   Assessment & Plan    Continue PT OT DVT prophylaxis  Patient will need to continue rehab after stabilization of her acute health issues  lovenox for dvt prophylaxis  Pain medications ordered  Also mild swelling and pain when her b/l lower extremities will obtain vde stat-for nonocclusive right lower extremity peroneal vein DVT  -repeat PD done yesterday no acute DVT found in bilateral lower extremities  On lovenox for dvt prophylaxis        Alzheimers disease   Assessment & Plan    Supportive care  Frequent reorientation  Avoid unnecessary sedatives benzodiazepines pain medication she just underwent orthopedic surgery,   Passed swallow eval - start diet mechanical soft thin liquid  [id number]   Patient was previously on Aricept but not anymore   Patient is very sensitive to pain medications it is hard to arouse her in the morning I will actually DC and nighttime gabapentin -was DC the day previously now the patient is fully awake conversational eating lethargy tremendously improved  She is a minimal dose of narcotics of morphine and oxycodone code and p r n           * Acute right-sided weakness   Assessment & Plan    Right upper and lower extremity able to move but is still weakness present  3 /5 upper arm and 3-/5 right lower leg  Need for 2-3 people on for from bed to chair

## 2018-07-25 NOTE — ASSESSMENT & PLAN NOTE
Right upper and lower extremity able to move but is still weakness present  3 /5 upper arm and 3-/5 right lower leg  Need for 2-3 people on for from bed to chair

## 2018-07-25 NOTE — PLAN OF CARE
Problem: Potential for Falls  Goal: Patient will remain free of falls  INTERVENTIONS:  - Assess patient frequently for physical needs  -  Identify cognitive and physical deficits and behaviors that affect risk of falls    -  [place] fall precautions as indicated by assessment   - Educate patient/family on patient safety including physical limitations  - Instruct patient to call for assistance with activity based on assessment  - Modify environment to reduce risk of injury  - Consider OT/PT consult to assist with strengthening/mobility   Outcome: Progressing

## 2018-07-25 NOTE — PLAN OF CARE
Problem: Communication Impairment  Goal: Ability to express needs and understand communication  Assess patient's communication skills and ability to understand information  Patient will demonstrate use of effective communication techniques, alternative methods of communication and understanding even if not able to speak  - Encourage communication and provide alternate methods of communication as needed  - Collaborate with case management/ for discharge needs  - Include patient/family/caregiver in decisions related to communication    Outcome: Progressing

## 2018-07-25 NOTE — PLAN OF CARE
Problem: SAFETY ADULT  Goal: Maintain or return mobility status to optimal level  INTERVENTIONS:  - Assess patient's baseline mobility status (ambulation, transfers, stairs, etc )    - Identify cognitive and physical deficits and behaviors that affect mobility  - Identify mobility aids required to assist with transfers and/or ambulation (gait belt, sit-to-stand, lift, walker, cane, etc )  - [place] fall precautions as indicated by assessment  - Record patient progress and toleration of activity level on Mobility SBAR; progress patient to next Phase/Stage  - Instruct patient to call for assistance with activity based on assessment  -   Outcome: Progressing

## 2018-07-25 NOTE — PLAN OF CARE
Problem: SAFETY ADULT  Goal: Maintain or return to baseline ADL function  INTERVENTIONS:  -  Assess patient's ability to carry out ADLs; assess patient's baseline for ADL function and identify physical deficits which impact ability to perform ADLs (bathing, care of mouth/teeth, toileting, grooming, dressing, etc )  - Assess/evaluate cause of self-care deficits   - Assess range of motion  - Assess patient's mobility; develop plan if impaired  - Assess patient's need for assistive devices and provide as appropriate  - Encourage maximum independence but intervene and supervise when necessary  ¯ Involve family in performance of ADLs  ¯ Assess for home care needs following discharge   ¯ Request OT consult to assist with ADL evaluation and planning for discharge  ¯ Provide patient education as appropriate  Outcome: Progressing

## 2018-07-25 NOTE — NURSING NOTE
Pt is alert and oriented x 1, she is confused  Follows commands sometimes and does not acknowledge other times  Lungs are clear and diminished in bases on room air  Heart rate regular with +2 radial-pedal pulses  Pt has pain with movement  And otherwise denies pain, tylenol administered, pt repositioned  R hip/upper incisions have intact staples, silver dressing and tegaderm, dressings changed this am, CDI  Pt also has blister on R upper thigh, new guaze and tegaderm applied, small amount of serosanguinous dressing present  Pt was administered colace, senna, and dulcolax as pt only had one small BM on 7/24 and before that it was on 7/17  Pt has call bell in reach, heels elevated in bed, will continue to monitor

## 2018-07-25 NOTE — PLAN OF CARE
Problem: Nutrition  Goal: Nutrition/Hydration status is improving  Monitor and assess patient's nutrition/hydration status for malnutrition (ex- brittle hair, bruises, dry skin, pale skin and conjunctiva, muscle wasting, smooth red tongue, and disorientation)  Collaborate with interdisciplinary team and initiate plan and interventions as ordered  Monitor patient's weight and dietary intake as ordered or per policy  Utilize nutrition screening tool and intervene per policy  Determine patient's food preferences and provide high-protein, high-caloric foods as appropriate  - Assist patient with eating   - Allow adequate time for meals   - Encourage patient to take dietary supplement as ordered  - Collaborate with clinical nutritionist   - Include patient/family/caregiver in decisions related to nutrition    Outcome: Progressing

## 2018-07-25 NOTE — ASSESSMENT & PLAN NOTE
· Patient's symptoms are improving her right upper extremity extremities she is able to raise it and touch her nose  More resistance  · Will continue aspirin and Plavix dual antiplatelet for 21 days, then will be on aspirin alone    Continue statin  · Has been evaluated Neurology  · Echo reviewed-no PFO  · Posterior circulation stenosis continue aspirin and Plavix for 21 days and statin discussed with the surgery vascular no other interventions  · Carotid less than 50% stenosis outpatient follow-up  · Encourage rehab in [facility]

## 2018-07-25 NOTE — PLAN OF CARE
Problem: Potential for Aspiration  Goal: Ventilated patient's risk of aspiration is minimized      - Elevate head of bed 30 degrees     Outcome: Progressing

## 2018-07-25 NOTE — PLAN OF CARE
Problem: Potential for Falls  Goal: Patient will remain free of falls  INTERVENTIONS:  - Assess patient frequently for physical needs  -  Identify cognitive and physical deficits and behaviors that affect risk of falls    -  [place] fall precautions as indicated by assessment   - Educate patient/family on patient safety including physical limitations  - Instruct patient to call for assistance with activity based on assessment  - Modify environment to reduce risk of injury  - Consider OT/PT consult to assist with strengthening/mobility   Outcome: Progressing

## 2018-07-25 NOTE — PLAN OF CARE
Problem: Neurological Deficit  Goal: Neurological status is stable or improving  Interventions:  - Monitor and assess patient's level of consciousness, motor function, sensory function, and level of assistance needed for ADLs  - Monitor and report changes from baseline  Collaborate with interdisciplinary team to initiate plan and implement interventions as ordered  - Provide and maintain a safe environment  - Utilize seizure precautions  - Utilize fall precautions  - Utilize aspiration precautions  - Utilize bleeding precautions    Outcome: Progressing

## 2018-07-25 NOTE — PLAN OF CARE
Problem: Activity Intolerance/Impaired Mobility  Goal: Mobility/activity is maintained at optimum level for patient  Interventions:  - Assess and monitor patient  barriers to mobility and need for assistive/adaptive devices  - Assess patient's emotional response to limitations  - Collaborate with interdisciplinary team and initiate plans and interventions as ordered  - Encourage independent activity per ability   - Maintain proper body alignment  - Perform active/passive rom as tolerated/ordered    - Plan activities to conserve energy   - Turn patient  Outcome: Progressing

## 2018-07-26 LAB
ANION GAP SERPL CALCULATED.3IONS-SCNC: 9 MMOL/L (ref 5–14)
BUN SERPL-MCNC: 12 MG/DL (ref 5–25)
CALCIUM SERPL-MCNC: 10.7 MG/DL (ref 8.4–10.2)
CHLORIDE SERPL-SCNC: 102 MMOL/L (ref 97–108)
CO2 SERPL-SCNC: 26 MMOL/L (ref 22–30)
CREAT SERPL-MCNC: 0.44 MG/DL (ref 0.6–1.2)
ERYTHROCYTE [DISTWIDTH] IN BLOOD BY AUTOMATED COUNT: 15.4 %
GFR SERPL CREATININE-BSD FRML MDRD: 91 ML/MIN/1.73SQ M
GLUCOSE SERPL-MCNC: 141 MG/DL (ref 70–99)
GLUCOSE SERPL-MCNC: 159 MG/DL (ref 70–99)
GLUCOSE SERPL-MCNC: 160 MG/DL (ref 70–99)
GLUCOSE SERPL-MCNC: 162 MG/DL (ref 70–99)
HCT VFR BLD AUTO: 32.6 % (ref 36–46)
HGB BLD-MCNC: 11 G/DL (ref 12–16)
MCH RBC QN AUTO: 30.1 PG (ref 26.8–34.3)
MCHC RBC AUTO-ENTMCNC: 33.8 G/DL (ref 31.4–37.4)
MCV RBC AUTO: 89 FL (ref 82–98)
PLATELET # BLD AUTO: 230 THOUSANDS/UL (ref 150–450)
PMV BLD AUTO: 7.2 FL (ref 8.9–12.7)
POTASSIUM SERPL-SCNC: 4.4 MMOL/L (ref 3.6–5)
RBC # BLD AUTO: 3.65 MILLION/UL (ref 4–5.2)
SODIUM SERPL-SCNC: 137 MMOL/L (ref 137–147)
WBC # BLD AUTO: 9.5 THOUSAND/UL (ref 4.31–10.16)

## 2018-07-26 PROCEDURE — 85027 COMPLETE CBC AUTOMATED: CPT | Performed by: PHYSICAL MEDICINE & REHABILITATION

## 2018-07-26 PROCEDURE — 80048 BASIC METABOLIC PNL TOTAL CA: CPT | Performed by: PHYSICAL MEDICINE & REHABILITATION

## 2018-07-26 PROCEDURE — 82948 REAGENT STRIP/BLOOD GLUCOSE: CPT

## 2018-07-26 RX ORDER — SENNOSIDES 8.6 MG
1 TABLET ORAL DAILY
Status: DISCONTINUED | OUTPATIENT
Start: 2018-07-27 | End: 2018-08-13 | Stop reason: HOSPADM

## 2018-07-26 RX ADMIN — LIDOCAINE 1 PATCH: 50 PATCH CUTANEOUS at 08:07

## 2018-07-26 RX ADMIN — SENNOSIDES 8.6 MG: 8.6 TABLET, FILM COATED ORAL at 08:07

## 2018-07-26 RX ADMIN — Medication 1000 MCG: at 08:08

## 2018-07-26 RX ADMIN — BROMOCRIPTINE MESYLATE 2.5 MG: 2.5 TABLET ORAL at 21:59

## 2018-07-26 RX ADMIN — INSULIN LISPRO 1 UNITS: 100 INJECTION, SOLUTION INTRAVENOUS; SUBCUTANEOUS at 08:05

## 2018-07-26 RX ADMIN — ACETAMINOPHEN 650 MG: 325 TABLET ORAL at 16:31

## 2018-07-26 RX ADMIN — ATORVASTATIN CALCIUM 40 MG: 40 TABLET, FILM COATED ORAL at 18:09

## 2018-07-26 RX ADMIN — ACETAMINOPHEN 650 MG: 325 TABLET ORAL at 12:19

## 2018-07-26 RX ADMIN — METHYLPHENIDATE HYDROCHLORIDE 5 MG: 5 TABLET ORAL at 06:26

## 2018-07-26 RX ADMIN — ENOXAPARIN SODIUM 40 MG: 40 INJECTION SUBCUTANEOUS at 08:06

## 2018-07-26 RX ADMIN — DOCUSATE SODIUM 100 MG: 100 CAPSULE, LIQUID FILLED ORAL at 08:08

## 2018-07-26 RX ADMIN — CITALOPRAM HYDROBROMIDE 10 MG: 20 TABLET ORAL at 08:08

## 2018-07-26 RX ADMIN — FERROUS SULFATE TAB 325 MG (65 MG ELEMENTAL FE) 325 MG: 325 (65 FE) TAB at 08:09

## 2018-07-26 RX ADMIN — INSULIN LISPRO 1 UNITS: 100 INJECTION, SOLUTION INTRAVENOUS; SUBCUTANEOUS at 12:18

## 2018-07-26 RX ADMIN — CLOPIDOGREL 75 MG: 75 TABLET, FILM COATED ORAL at 08:08

## 2018-07-26 RX ADMIN — ACETAMINOPHEN 650 MG: 325 TABLET ORAL at 06:27

## 2018-07-26 RX ADMIN — ASPIRIN 81 MG 81 MG: 81 TABLET ORAL at 08:07

## 2018-07-26 RX ADMIN — AMLODIPINE BESYLATE 10 MG: 5 TABLET ORAL at 08:07

## 2018-07-26 RX ADMIN — DOCUSATE SODIUM 100 MG: 100 CAPSULE, LIQUID FILLED ORAL at 18:09

## 2018-07-26 NOTE — PLAN OF CARE
Problem: Potential for Falls  Goal: Patient will remain free of falls  INTERVENTIONS:  - Assess patient frequently for physical needs  -  Identify cognitive and physical deficits and behaviors that affect risk of falls    -  [place] fall precautions as indicated by assessment   - Educate patient/family on patient safety including physical limitations  - Instruct patient to call for assistance with activity based on assessment  - Modify environment to reduce risk of injury  - Consider OT/PT consult to assist with strengthening/mobility   Outcome: Not Progressing      Problem: Prexisting or High Potential for Compromised Skin Integrity  Goal: Skin integrity is maintained or improved  INTERVENTIONS:  - Identify patients at risk for skin breakdown  - Assess and monitor skin integrity  - Assess and monitor nutrition and hydration status  - Monitor labs (i e  albumin)  - Assess for incontinence   - Turn and reposition patient  - Assist with mobility/ambulation  - Relieve pressure over bony prominences  - Avoid friction and shearing  - Provide appropriate hygiene as needed including keeping skin clean and dry  - Evaluate need for skin moisturizer/barrier cream  - Collaborate with interdisciplinary team (i e  Nutrition, Rehabilitation, etc )   - Patient/family teaching   Outcome: Progressing      Problem: PAIN - ADULT  Goal: Verbalizes/displays adequate comfort level or baseline comfort level  Interventions:  - Encourage patient to monitor pain and request assistance  - Assess pain using appropriate pain scale  - Administer analgesics based on type and severity of pain and evaluate response  - Implement non-pharmacological measures as appropriate and evaluate response  - Consider cultural and social influences on pain and pain management  - Notify physician/advanced practitioner if interventions unsuccessful or patient reports new pain   Outcome: Progressing      Problem: SAFETY ADULT  Goal: Maintain or return to baseline ADL function  INTERVENTIONS:  -  Assess patient's ability to carry out ADLs; assess patient's baseline for ADL function and identify physical deficits which impact ability to perform ADLs (bathing, care of mouth/teeth, toileting, grooming, dressing, etc )  - Assess/evaluate cause of self-care deficits   - Assess range of motion  - Assess patient's mobility; develop plan if impaired  - Assess patient's need for assistive devices and provide as appropriate  - Encourage maximum independence but intervene and supervise when necessary  ¯ Involve family in performance of ADLs  ¯ Assess for home care needs following discharge   ¯ Request OT consult to assist with ADL evaluation and planning for discharge  ¯ Provide patient education as appropriate   Outcome: Progressing    Goal: Maintain or return mobility status to optimal level  INTERVENTIONS:  - Assess patient's baseline mobility status (ambulation, transfers, stairs, etc )    - Identify cognitive and physical deficits and behaviors that affect mobility  - Identify mobility aids required to assist with transfers and/or ambulation (gait belt, sit-to-stand, lift, walker, cane, etc )  - [place] fall precautions as indicated by assessment  - Record patient progress and toleration of activity level on Mobility SBAR; progress patient to next Phase/Stage  - Instruct patient to call for assistance with activity based on assessment  -    Outcome: Progressing      Problem: DISCHARGE PLANNING  Goal: Discharge to home or other facility with appropriate resources  INTERVENTIONS:  - Identify barriers to discharge w/patient and caregiver  - Arrange for needed discharge resources and transportation as appropriate  - Identify discharge learning needs (meds, wound care, etc )  - Arrange for interpretive services to assist at discharge as needed  - Refer to Case Management Department for coordinating discharge planning if the patient needs post-hospital services based on physician/advanced practitioner order or complex needs related to functional status, cognitive ability, or social support system   Outcome: Progressing      Problem: Neurological Deficit  Goal: Neurological status is stable or improving  Interventions:  - Monitor and assess patient's level of consciousness, motor function, sensory function, and level of assistance needed for ADLs  - Monitor and report changes from baseline  Collaborate with interdisciplinary team to initiate plan and implement interventions as ordered  - Provide and maintain a safe environment  - Utilize seizure precautions  - Utilize fall precautions  - Utilize aspiration precautions  - Utilize bleeding precautions  Outcome: Progressing      Problem: Activity Intolerance/Impaired Mobility  Goal: Mobility/activity is maintained at optimum level for patient  Interventions:  - Assess and monitor patient  barriers to mobility and need for assistive/adaptive devices  - Assess patient's emotional response to limitations  - Collaborate with interdisciplinary team and initiate plans and interventions as ordered  - Encourage independent activity per ability   - Maintain proper body alignment  - Perform active/passive rom as tolerated/ordered  - Plan activities to conserve energy   - Turn patient   Outcome: Progressing      Problem: Communication Impairment  Goal: Ability to express needs and understand communication  Assess patient's communication skills and ability to understand information  Patient will demonstrate use of effective communication techniques, alternative methods of communication and understanding even if not able to speak  - Encourage communication and provide alternate methods of communication as needed  - Collaborate with case management/ for discharge needs  - Include patient/family/caregiver in decisions related to communication     Outcome: Progressing      Problem: Potential for Aspiration  Goal: Non-ventilated patient's risk of aspiration is minimized  Assess and monitor vital signs, respiratory status, and labs (WBC)  Monitor for signs of aspiration (tachypnea, cough, rales, wheezing, cyanosis, fever)  - Assess and monitor patient's ability to swallow  - Place patient up in chair to eat if possible  - HOB up at 90 degrees to eat if unable to get patient up into chair   - Supervise patient during oral intake  - Instruct patient to take small bites  - Instruct patient to take small single sips when taking liquids  - Follow patient-specific strategies generated by speech pathologist    Outcome: Progressing    Goal: Ventilated patient's risk of aspiration is minimized      - Elevate head of bed 30 degrees   Outcome: Completed Date Met: 07/26/18      Problem: Nutrition  Goal: Nutrition/Hydration status is improving  Monitor and assess patient's nutrition/hydration status for malnutrition (ex- brittle hair, bruises, dry skin, pale skin and conjunctiva, muscle wasting, smooth red tongue, and disorientation)  Collaborate with interdisciplinary team and initiate plan and interventions as ordered  Monitor patient's weight and dietary intake as ordered or per policy  Utilize nutrition screening tool and intervene per policy  Determine patient's food preferences and provide high-protein, high-caloric foods as appropriate  - Assist patient with eating   - Allow adequate time for meals   - Encourage patient to take dietary supplement as ordered  - Collaborate with clinical nutritionist   - Include patient/family/caregiver in decisions related to nutrition     Outcome: Progressing

## 2018-07-27 ENCOUNTER — APPOINTMENT (OUTPATIENT)
Dept: NON INVASIVE DIAGNOSTICS | Facility: HOSPITAL | Age: 83
End: 2018-07-27
Payer: MEDICARE

## 2018-07-27 LAB
GLUCOSE SERPL-MCNC: 147 MG/DL (ref 70–99)
GLUCOSE SERPL-MCNC: 148 MG/DL (ref 70–99)
GLUCOSE SERPL-MCNC: 193 MG/DL (ref 70–99)

## 2018-07-27 PROCEDURE — 93970 EXTREMITY STUDY: CPT | Performed by: SURGERY

## 2018-07-27 PROCEDURE — 93970 EXTREMITY STUDY: CPT

## 2018-07-27 PROCEDURE — 82948 REAGENT STRIP/BLOOD GLUCOSE: CPT

## 2018-07-27 RX ORDER — CLONIDINE HYDROCHLORIDE 0.1 MG/1
0.1 TABLET ORAL ONCE
Status: DISCONTINUED | OUTPATIENT
Start: 2018-07-27 | End: 2018-08-08

## 2018-07-27 RX ADMIN — ATORVASTATIN CALCIUM 40 MG: 40 TABLET, FILM COATED ORAL at 17:36

## 2018-07-27 RX ADMIN — CLOPIDOGREL 75 MG: 75 TABLET, FILM COATED ORAL at 08:38

## 2018-07-27 RX ADMIN — ACETAMINOPHEN 650 MG: 325 TABLET ORAL at 06:09

## 2018-07-27 RX ADMIN — ASPIRIN 81 MG 81 MG: 81 TABLET ORAL at 08:37

## 2018-07-27 RX ADMIN — ACETAMINOPHEN 650 MG: 325 TABLET ORAL at 17:36

## 2018-07-27 RX ADMIN — CITALOPRAM HYDROBROMIDE 10 MG: 20 TABLET ORAL at 08:38

## 2018-07-27 RX ADMIN — DOCUSATE SODIUM 100 MG: 100 CAPSULE, LIQUID FILLED ORAL at 17:36

## 2018-07-27 RX ADMIN — LIDOCAINE 1 PATCH: 50 PATCH CUTANEOUS at 08:39

## 2018-07-27 RX ADMIN — INSULIN LISPRO 1 UNITS: 100 INJECTION, SOLUTION INTRAVENOUS; SUBCUTANEOUS at 12:17

## 2018-07-27 RX ADMIN — FERROUS SULFATE TAB 325 MG (65 MG ELEMENTAL FE) 325 MG: 325 (65 FE) TAB at 08:37

## 2018-07-27 RX ADMIN — METHYLPHENIDATE HYDROCHLORIDE 5 MG: 5 TABLET ORAL at 06:20

## 2018-07-27 RX ADMIN — BROMOCRIPTINE MESYLATE 2.5 MG: 2.5 TABLET ORAL at 21:44

## 2018-07-27 RX ADMIN — ACETAMINOPHEN 650 MG: 325 TABLET ORAL at 12:13

## 2018-07-27 RX ADMIN — DOCUSATE SODIUM 100 MG: 100 CAPSULE, LIQUID FILLED ORAL at 08:38

## 2018-07-27 RX ADMIN — SENNOSIDES 8.6 MG: 8.6 TABLET, FILM COATED ORAL at 08:38

## 2018-07-27 RX ADMIN — Medication 1000 MCG: at 08:37

## 2018-07-27 RX ADMIN — ENOXAPARIN SODIUM 40 MG: 40 INJECTION SUBCUTANEOUS at 08:38

## 2018-07-27 RX ADMIN — AMLODIPINE BESYLATE 10 MG: 5 TABLET ORAL at 08:38

## 2018-07-27 NOTE — NURSING NOTE
PT had R lower calf pain as reported by physical therapist  Zoran Armando scan was ordered, pt transported to have test done  Family is aware, pt had chart rounds earlier and she is for potential discharge on August 12

## 2018-07-28 LAB
GLUCOSE SERPL-MCNC: 138 MG/DL (ref 70–99)
GLUCOSE SERPL-MCNC: 156 MG/DL (ref 70–99)
GLUCOSE SERPL-MCNC: 184 MG/DL (ref 70–99)

## 2018-07-28 PROCEDURE — 82948 REAGENT STRIP/BLOOD GLUCOSE: CPT

## 2018-07-28 RX ADMIN — INSULIN LISPRO 1 UNITS: 100 INJECTION, SOLUTION INTRAVENOUS; SUBCUTANEOUS at 17:13

## 2018-07-28 RX ADMIN — BROMOCRIPTINE MESYLATE 2.5 MG: 2.5 TABLET ORAL at 22:05

## 2018-07-28 RX ADMIN — METHYLPHENIDATE HYDROCHLORIDE 5 MG: 5 TABLET ORAL at 06:55

## 2018-07-28 RX ADMIN — ATORVASTATIN CALCIUM 40 MG: 40 TABLET, FILM COATED ORAL at 17:13

## 2018-07-28 RX ADMIN — DOCUSATE SODIUM 100 MG: 100 CAPSULE, LIQUID FILLED ORAL at 08:59

## 2018-07-28 RX ADMIN — CLOPIDOGREL 75 MG: 75 TABLET, FILM COATED ORAL at 08:59

## 2018-07-28 RX ADMIN — ACETAMINOPHEN 650 MG: 325 TABLET ORAL at 17:13

## 2018-07-28 RX ADMIN — LIDOCAINE 1 PATCH: 50 PATCH CUTANEOUS at 09:00

## 2018-07-28 RX ADMIN — ACETAMINOPHEN 650 MG: 325 TABLET ORAL at 11:46

## 2018-07-28 RX ADMIN — INSULIN LISPRO 1 UNITS: 100 INJECTION, SOLUTION INTRAVENOUS; SUBCUTANEOUS at 11:47

## 2018-07-28 RX ADMIN — AMLODIPINE BESYLATE 10 MG: 5 TABLET ORAL at 08:59

## 2018-07-28 RX ADMIN — ASPIRIN 81 MG 81 MG: 81 TABLET ORAL at 08:59

## 2018-07-28 RX ADMIN — CITALOPRAM HYDROBROMIDE 10 MG: 20 TABLET ORAL at 08:59

## 2018-07-28 RX ADMIN — ACETAMINOPHEN 650 MG: 325 TABLET ORAL at 06:55

## 2018-07-28 RX ADMIN — SENNOSIDES 8.6 MG: 8.6 TABLET, FILM COATED ORAL at 09:00

## 2018-07-28 RX ADMIN — DOCUSATE SODIUM 100 MG: 100 CAPSULE, LIQUID FILLED ORAL at 17:14

## 2018-07-28 RX ADMIN — ENOXAPARIN SODIUM 40 MG: 40 INJECTION SUBCUTANEOUS at 08:59

## 2018-07-28 RX ADMIN — Medication 1000 MCG: at 08:59

## 2018-07-28 RX ADMIN — FERROUS SULFATE TAB 325 MG (65 MG ELEMENTAL FE) 325 MG: 325 (65 FE) TAB at 09:00

## 2018-07-28 NOTE — PLAN OF CARE
Problem: Potential for Falls  Goal: Patient will remain free of falls  INTERVENTIONS:  - Assess patient frequently for physical needs  -  Identify cognitive and physical deficits and behaviors that affect risk of falls    -  [place] fall precautions as indicated by assessment   - Educate patient/family on patient safety including physical limitations  - Instruct patient to call for assistance with activity based on assessment  - Modify environment to reduce risk of injury  - Consider OT/PT consult to assist with strengthening/mobility   Outcome: Progressing      Problem: Prexisting or High Potential for Compromised Skin Integrity  Goal: Skin integrity is maintained or improved  INTERVENTIONS:  - Identify patients at risk for skin breakdown  - Assess and monitor skin integrity  - Assess and monitor nutrition and hydration status  - Monitor labs (i e  albumin)  - Assess for incontinence   - Turn and reposition patient  - Assist with mobility/ambulation  - Relieve pressure over bony prominences  - Avoid friction and shearing  - Provide appropriate hygiene as needed including keeping skin clean and dry  - Evaluate need for skin moisturizer/barrier cream  - Collaborate with interdisciplinary team (i e  Nutrition, Rehabilitation, etc )   - Patient/family teaching   Outcome: Progressing      Problem: PAIN - ADULT  Goal: Verbalizes/displays adequate comfort level or baseline comfort level  Interventions:  - Encourage patient to monitor pain and request assistance  - Assess pain using appropriate pain scale  - Administer analgesics based on type and severity of pain and evaluate response  - Implement non-pharmacological measures as appropriate and evaluate response  - Consider cultural and social influences on pain and pain management  - Notify physician/advanced practitioner if interventions unsuccessful or patient reports new pain   Outcome: Progressing      Problem: SAFETY ADULT  Goal: Maintain or return to baseline ADL function  INTERVENTIONS:  -  Assess patient's ability to carry out ADLs; assess patient's baseline for ADL function and identify physical deficits which impact ability to perform ADLs (bathing, care of mouth/teeth, toileting, grooming, dressing, etc )  - Assess/evaluate cause of self-care deficits   - Assess range of motion  - Assess patient's mobility; develop plan if impaired  - Assess patient's need for assistive devices and provide as appropriate  - Encourage maximum independence but intervene and supervise when necessary  ¯ Involve family in performance of ADLs  ¯ Assess for home care needs following discharge   ¯ Request OT consult to assist with ADL evaluation and planning for discharge  ¯ Provide patient education as appropriate   Outcome: Progressing    Goal: Maintain or return mobility status to optimal level  INTERVENTIONS:  - Assess patient's baseline mobility status (ambulation, transfers, stairs, etc )    - Identify cognitive and physical deficits and behaviors that affect mobility  - Identify mobility aids required to assist with transfers and/or ambulation (gait belt, sit-to-stand, lift, walker, cane, etc )  - [place] fall precautions as indicated by assessment  - Record patient progress and toleration of activity level on Mobility SBAR; progress patient to next Phase/Stage  - Instruct patient to call for assistance with activity based on assessment  -    Outcome: Progressing      Problem: DISCHARGE PLANNING  Goal: Discharge to home or other facility with appropriate resources  INTERVENTIONS:  - Identify barriers to discharge w/patient and caregiver  - Arrange for needed discharge resources and transportation as appropriate  - Identify discharge learning needs (meds, wound care, etc )  - Arrange for interpretive services to assist at discharge as needed  - Refer to Case Management Department for coordinating discharge planning if the patient needs post-hospital services based on physician/advanced practitioner order or complex needs related to functional status, cognitive ability, or social support system   Outcome: Progressing      Problem: Neurological Deficit  Goal: Neurological status is stable or improving  Interventions:  - Monitor and assess patient's level of consciousness, motor function, sensory function, and level of assistance needed for ADLs  - Monitor and report changes from baseline  Collaborate with interdisciplinary team to initiate plan and implement interventions as ordered  - Provide and maintain a safe environment  - Utilize seizure precautions  - Utilize fall precautions  - Utilize aspiration precautions  - Utilize bleeding precautions  Outcome: Progressing      Problem: Activity Intolerance/Impaired Mobility  Goal: Mobility/activity is maintained at optimum level for patient  Interventions:  - Assess and monitor patient  barriers to mobility and need for assistive/adaptive devices  - Assess patient's emotional response to limitations  - Collaborate with interdisciplinary team and initiate plans and interventions as ordered  - Encourage independent activity per ability   - Maintain proper body alignment  - Perform active/passive rom as tolerated/ordered  - Plan activities to conserve energy   - Turn patient   Outcome: Progressing      Problem: Communication Impairment  Goal: Ability to express needs and understand communication  Assess patient's communication skills and ability to understand information  Patient will demonstrate use of effective communication techniques, alternative methods of communication and understanding even if not able to speak  - Encourage communication and provide alternate methods of communication as needed  - Collaborate with case management/ for discharge needs  - Include patient/family/caregiver in decisions related to communication     Outcome: Progressing      Problem: Potential for Aspiration  Goal: Non-ventilated patient's risk of aspiration is minimized  Assess and monitor vital signs, respiratory status, and labs (WBC)  Monitor for signs of aspiration (tachypnea, cough, rales, wheezing, cyanosis, fever)  - Assess and monitor patient's ability to swallow  - Place patient up in chair to eat if possible  - HOB up at 90 degrees to eat if unable to get patient up into chair   - Supervise patient during oral intake  - Instruct patient to take small bites  - Instruct patient to take small single sips when taking liquids  - Follow patient-specific strategies generated by speech pathologist    Outcome: Progressing      Problem: Nutrition  Goal: Nutrition/Hydration status is improving  Monitor and assess patient's nutrition/hydration status for malnutrition (ex- brittle hair, bruises, dry skin, pale skin and conjunctiva, muscle wasting, smooth red tongue, and disorientation)  Collaborate with interdisciplinary team and initiate plan and interventions as ordered  Monitor patient's weight and dietary intake as ordered or per policy  Utilize nutrition screening tool and intervene per policy  Determine patient's food preferences and provide high-protein, high-caloric foods as appropriate  - Assist patient with eating   - Allow adequate time for meals   - Encourage patient to take dietary supplement as ordered  - Collaborate with clinical nutritionist   - Include patient/family/caregiver in decisions related to nutrition     Outcome: Not Progressing

## 2018-07-28 NOTE — PLAN OF CARE
Problem: Potential for Falls  Goal: Patient will remain free of falls  INTERVENTIONS:  - Assess patient frequently for physical needs  -  Identify cognitive and physical deficits and behaviors that affect risk of falls    -  [place] fall precautions as indicated by assessment   - Educate patient/family on patient safety including physical limitations  - Instruct patient to call for assistance with activity based on assessment  - Modify environment to reduce risk of injury  - Consider OT/PT consult to assist with strengthening/mobility   Outcome: Progressing      Problem: Prexisting or High Potential for Compromised Skin Integrity  Goal: Skin integrity is maintained or improved  INTERVENTIONS:  - Identify patients at risk for skin breakdown  - Assess and monitor skin integrity  - Assess and monitor nutrition and hydration status  - Monitor labs (i e  albumin)  - Assess for incontinence   - Turn and reposition patient  - Assist with mobility/ambulation  - Relieve pressure over bony prominences  - Avoid friction and shearing  - Provide appropriate hygiene as needed including keeping skin clean and dry  - Evaluate need for skin moisturizer/barrier cream  - Collaborate with interdisciplinary team (i e  Nutrition, Rehabilitation, etc )   - Patient/family teaching   Outcome: Progressing      Problem: PAIN - ADULT  Goal: Verbalizes/displays adequate comfort level or baseline comfort level  Interventions:  - Encourage patient to monitor pain and request assistance  - Assess pain using appropriate pain scale  - Administer analgesics based on type and severity of pain and evaluate response  - Implement non-pharmacological measures as appropriate and evaluate response  - Consider cultural and social influences on pain and pain management  - Notify physician/advanced practitioner if interventions unsuccessful or patient reports new pain   Outcome: Progressing      Problem: SAFETY ADULT  Goal: Maintain or return to baseline ADL function  INTERVENTIONS:  -  Assess patient's ability to carry out ADLs; assess patient's baseline for ADL function and identify physical deficits which impact ability to perform ADLs (bathing, care of mouth/teeth, toileting, grooming, dressing, etc )  - Assess/evaluate cause of self-care deficits   - Assess range of motion  - Assess patient's mobility; develop plan if impaired  - Assess patient's need for assistive devices and provide as appropriate  - Encourage maximum independence but intervene and supervise when necessary  ¯ Involve family in performance of ADLs  ¯ Assess for home care needs following discharge   ¯ Request OT consult to assist with ADL evaluation and planning for discharge  ¯ Provide patient education as appropriate   Outcome: Progressing    Goal: Maintain or return mobility status to optimal level  INTERVENTIONS:  - Assess patient's baseline mobility status (ambulation, transfers, stairs, etc )    - Identify cognitive and physical deficits and behaviors that affect mobility  - Identify mobility aids required to assist with transfers and/or ambulation (gait belt, sit-to-stand, lift, walker, cane, etc )  - [place] fall precautions as indicated by assessment  - Record patient progress and toleration of activity level on Mobility SBAR; progress patient to next Phase/Stage  - Instruct patient to call for assistance with activity based on assessment  -    Outcome: Progressing      Problem: DISCHARGE PLANNING  Goal: Discharge to home or other facility with appropriate resources  INTERVENTIONS:  - Identify barriers to discharge w/patient and caregiver  - Arrange for needed discharge resources and transportation as appropriate  - Identify discharge learning needs (meds, wound care, etc )  - Arrange for interpretive services to assist at discharge as needed  - Refer to Case Management Department for coordinating discharge planning if the patient needs post-hospital services based on physician/advanced practitioner order or complex needs related to functional status, cognitive ability, or social support system   Outcome: Progressing      Problem: Neurological Deficit  Goal: Neurological status is stable or improving  Interventions:  - Monitor and assess patient's level of consciousness, motor function, sensory function, and level of assistance needed for ADLs  - Monitor and report changes from baseline  Collaborate with interdisciplinary team to initiate plan and implement interventions as ordered  - Provide and maintain a safe environment  - Utilize seizure precautions  - Utilize fall precautions  - Utilize aspiration precautions  - Utilize bleeding precautions  Outcome: Progressing      Problem: Activity Intolerance/Impaired Mobility  Goal: Mobility/activity is maintained at optimum level for patient  Interventions:  - Assess and monitor patient  barriers to mobility and need for assistive/adaptive devices  - Assess patient's emotional response to limitations  - Collaborate with interdisciplinary team and initiate plans and interventions as ordered  - Encourage independent activity per ability   - Maintain proper body alignment  - Perform active/passive rom as tolerated/ordered  - Plan activities to conserve energy   - Turn patient   Outcome: Progressing      Problem: Communication Impairment  Goal: Ability to express needs and understand communication  Assess patient's communication skills and ability to understand information  Patient will demonstrate use of effective communication techniques, alternative methods of communication and understanding even if not able to speak  - Encourage communication and provide alternate methods of communication as needed  - Collaborate with case management/ for discharge needs  - Include patient/family/caregiver in decisions related to communication     Outcome: Progressing      Problem: Potential for Aspiration  Goal: Non-ventilated patient's risk of aspiration is minimized  Assess and monitor vital signs, respiratory status, and labs (WBC)  Monitor for signs of aspiration (tachypnea, cough, rales, wheezing, cyanosis, fever)  - Assess and monitor patient's ability to swallow  - Place patient up in chair to eat if possible  - HOB up at 90 degrees to eat if unable to get patient up into chair   - Supervise patient during oral intake  - Instruct patient to take small bites  - Instruct patient to take small single sips when taking liquids  - Follow patient-specific strategies generated by speech pathologist    Outcome: Progressing      Problem: Nutrition  Goal: Nutrition/Hydration status is improving  Monitor and assess patient's nutrition/hydration status for malnutrition (ex- brittle hair, bruises, dry skin, pale skin and conjunctiva, muscle wasting, smooth red tongue, and disorientation)  Collaborate with interdisciplinary team and initiate plan and interventions as ordered  Monitor patient's weight and dietary intake as ordered or per policy  Utilize nutrition screening tool and intervene per policy  Determine patient's food preferences and provide high-protein, high-caloric foods as appropriate  - Assist patient with eating   - Allow adequate time for meals   - Encourage patient to take dietary supplement as ordered  - Collaborate with clinical nutritionist   - Include patient/family/caregiver in decisions related to nutrition     Outcome: Progressing

## 2018-07-29 LAB
GLUCOSE SERPL-MCNC: 163 MG/DL (ref 70–99)
GLUCOSE SERPL-MCNC: 178 MG/DL (ref 70–99)
GLUCOSE SERPL-MCNC: 178 MG/DL (ref 70–99)

## 2018-07-29 PROCEDURE — 82948 REAGENT STRIP/BLOOD GLUCOSE: CPT

## 2018-07-29 RX ADMIN — DOCUSATE SODIUM 100 MG: 100 CAPSULE, LIQUID FILLED ORAL at 08:17

## 2018-07-29 RX ADMIN — FERROUS SULFATE TAB 325 MG (65 MG ELEMENTAL FE) 325 MG: 325 (65 FE) TAB at 06:30

## 2018-07-29 RX ADMIN — BISACODYL 5 MG: 5 TABLET, COATED ORAL at 08:18

## 2018-07-29 RX ADMIN — ACETAMINOPHEN 650 MG: 325 TABLET ORAL at 06:29

## 2018-07-29 RX ADMIN — ATORVASTATIN CALCIUM 40 MG: 40 TABLET, FILM COATED ORAL at 18:15

## 2018-07-29 RX ADMIN — INSULIN LISPRO 1 UNITS: 100 INJECTION, SOLUTION INTRAVENOUS; SUBCUTANEOUS at 17:02

## 2018-07-29 RX ADMIN — LIDOCAINE 1 PATCH: 50 PATCH CUTANEOUS at 08:17

## 2018-07-29 RX ADMIN — Medication 1000 MCG: at 08:17

## 2018-07-29 RX ADMIN — ASPIRIN 81 MG 81 MG: 81 TABLET ORAL at 08:16

## 2018-07-29 RX ADMIN — ACETAMINOPHEN 650 MG: 325 TABLET ORAL at 11:59

## 2018-07-29 RX ADMIN — CITALOPRAM HYDROBROMIDE 10 MG: 20 TABLET ORAL at 08:17

## 2018-07-29 RX ADMIN — METHYLPHENIDATE HYDROCHLORIDE 5 MG: 5 TABLET ORAL at 06:29

## 2018-07-29 RX ADMIN — ACETAMINOPHEN 650 MG: 325 TABLET ORAL at 17:01

## 2018-07-29 RX ADMIN — CLOPIDOGREL 75 MG: 75 TABLET, FILM COATED ORAL at 08:17

## 2018-07-29 RX ADMIN — SENNOSIDES 8.6 MG: 8.6 TABLET, FILM COATED ORAL at 08:16

## 2018-07-29 RX ADMIN — INSULIN LISPRO 1 UNITS: 100 INJECTION, SOLUTION INTRAVENOUS; SUBCUTANEOUS at 11:59

## 2018-07-29 RX ADMIN — BROMOCRIPTINE MESYLATE 2.5 MG: 2.5 TABLET ORAL at 22:48

## 2018-07-29 RX ADMIN — INSULIN LISPRO 1 UNITS: 100 INJECTION, SOLUTION INTRAVENOUS; SUBCUTANEOUS at 08:16

## 2018-07-29 RX ADMIN — ENOXAPARIN SODIUM 40 MG: 40 INJECTION SUBCUTANEOUS at 08:16

## 2018-07-30 LAB
GLUCOSE SERPL-MCNC: 147 MG/DL (ref 70–99)
GLUCOSE SERPL-MCNC: 161 MG/DL (ref 70–99)
GLUCOSE SERPL-MCNC: 163 MG/DL (ref 70–99)

## 2018-07-30 PROCEDURE — 82948 REAGENT STRIP/BLOOD GLUCOSE: CPT

## 2018-07-30 RX ADMIN — SENNOSIDES 8.6 MG: 8.6 TABLET, FILM COATED ORAL at 09:43

## 2018-07-30 RX ADMIN — FERROUS SULFATE TAB 325 MG (65 MG ELEMENTAL FE) 325 MG: 325 (65 FE) TAB at 06:34

## 2018-07-30 RX ADMIN — BROMOCRIPTINE MESYLATE 2.5 MG: 2.5 TABLET ORAL at 22:16

## 2018-07-30 RX ADMIN — CLOPIDOGREL 75 MG: 75 TABLET, FILM COATED ORAL at 09:44

## 2018-07-30 RX ADMIN — LIDOCAINE 1 PATCH: 50 PATCH CUTANEOUS at 09:44

## 2018-07-30 RX ADMIN — CITALOPRAM HYDROBROMIDE 10 MG: 20 TABLET ORAL at 09:43

## 2018-07-30 RX ADMIN — Medication 1000 MCG: at 09:43

## 2018-07-30 RX ADMIN — ATORVASTATIN CALCIUM 40 MG: 40 TABLET, FILM COATED ORAL at 17:35

## 2018-07-30 RX ADMIN — METHYLPHENIDATE HYDROCHLORIDE 5 MG: 5 TABLET ORAL at 06:35

## 2018-07-30 RX ADMIN — DOCUSATE SODIUM 100 MG: 100 CAPSULE, LIQUID FILLED ORAL at 17:35

## 2018-07-30 RX ADMIN — ASPIRIN 81 MG 81 MG: 81 TABLET ORAL at 09:44

## 2018-07-30 RX ADMIN — DOCUSATE SODIUM 100 MG: 100 CAPSULE, LIQUID FILLED ORAL at 09:43

## 2018-07-30 RX ADMIN — INSULIN LISPRO 1 UNITS: 100 INJECTION, SOLUTION INTRAVENOUS; SUBCUTANEOUS at 17:12

## 2018-07-30 RX ADMIN — ACETAMINOPHEN 650 MG: 325 TABLET ORAL at 06:34

## 2018-07-30 RX ADMIN — ENOXAPARIN SODIUM 40 MG: 40 INJECTION SUBCUTANEOUS at 09:44

## 2018-07-30 RX ADMIN — INSULIN LISPRO 1 UNITS: 100 INJECTION, SOLUTION INTRAVENOUS; SUBCUTANEOUS at 12:18

## 2018-07-30 RX ADMIN — ACETAMINOPHEN 650 MG: 325 TABLET ORAL at 17:11

## 2018-07-30 RX ADMIN — AMLODIPINE BESYLATE 10 MG: 5 TABLET ORAL at 09:42

## 2018-07-30 RX ADMIN — ACETAMINOPHEN 650 MG: 325 TABLET ORAL at 12:16

## 2018-07-31 LAB
GLUCOSE SERPL-MCNC: 144 MG/DL (ref 70–99)
GLUCOSE SERPL-MCNC: 148 MG/DL (ref 70–99)
GLUCOSE SERPL-MCNC: 191 MG/DL (ref 70–99)

## 2018-07-31 PROCEDURE — 82948 REAGENT STRIP/BLOOD GLUCOSE: CPT

## 2018-07-31 RX ADMIN — SENNOSIDES 8.6 MG: 8.6 TABLET, FILM COATED ORAL at 09:00

## 2018-07-31 RX ADMIN — ACETAMINOPHEN 650 MG: 325 TABLET ORAL at 16:59

## 2018-07-31 RX ADMIN — AMLODIPINE BESYLATE 5 MG: 5 TABLET ORAL at 09:00

## 2018-07-31 RX ADMIN — ACETAMINOPHEN 325 MG: 325 TABLET ORAL at 12:14

## 2018-07-31 RX ADMIN — DOCUSATE SODIUM 100 MG: 100 CAPSULE, LIQUID FILLED ORAL at 20:18

## 2018-07-31 RX ADMIN — ATORVASTATIN CALCIUM 40 MG: 40 TABLET, FILM COATED ORAL at 20:18

## 2018-07-31 RX ADMIN — CITALOPRAM HYDROBROMIDE: 20 TABLET ORAL at 08:57

## 2018-07-31 RX ADMIN — DOCUSATE SODIUM 100 MG: 100 CAPSULE, LIQUID FILLED ORAL at 08:59

## 2018-07-31 RX ADMIN — LIDOCAINE 1 PATCH: 50 PATCH CUTANEOUS at 09:08

## 2018-07-31 RX ADMIN — ACETAMINOPHEN 650 MG: 325 TABLET ORAL at 06:51

## 2018-07-31 RX ADMIN — METHYLPHENIDATE HYDROCHLORIDE 5 MG: 5 TABLET ORAL at 06:51

## 2018-07-31 RX ADMIN — FERROUS SULFATE TAB 325 MG (65 MG ELEMENTAL FE) 325 MG: 325 (65 FE) TAB at 06:51

## 2018-07-31 RX ADMIN — ENOXAPARIN SODIUM 40 MG: 40 INJECTION SUBCUTANEOUS at 09:02

## 2018-07-31 RX ADMIN — CLOPIDOGREL 75 MG: 75 TABLET, FILM COATED ORAL at 08:58

## 2018-07-31 RX ADMIN — Medication 1000 MCG: at 08:58

## 2018-07-31 RX ADMIN — INSULIN LISPRO 1 UNITS: 100 INJECTION, SOLUTION INTRAVENOUS; SUBCUTANEOUS at 12:11

## 2018-07-31 RX ADMIN — BROMOCRIPTINE MESYLATE 2.5 MG: 2.5 TABLET ORAL at 21:06

## 2018-07-31 RX ADMIN — ASPIRIN 81 MG 81 MG: 81 TABLET ORAL at 08:55

## 2018-07-31 NOTE — NURSING NOTE
Noted petechia at the back of R loweg leg  Not sure if it's because of SCD's or heel boots  Will continue to monitor

## 2018-08-01 ENCOUNTER — TELEPHONE (OUTPATIENT)
Dept: OBGYN CLINIC | Facility: HOSPITAL | Age: 83
End: 2018-08-01

## 2018-08-01 LAB
GLUCOSE SERPL-MCNC: 135 MG/DL (ref 70–99)
GLUCOSE SERPL-MCNC: 147 MG/DL (ref 70–99)

## 2018-08-01 PROCEDURE — 82948 REAGENT STRIP/BLOOD GLUCOSE: CPT

## 2018-08-01 RX ADMIN — SENNOSIDES 8.6 MG: 8.6 TABLET, FILM COATED ORAL at 08:49

## 2018-08-01 RX ADMIN — LIDOCAINE 1 PATCH: 50 PATCH CUTANEOUS at 08:49

## 2018-08-01 RX ADMIN — DOCUSATE SODIUM 100 MG: 100 CAPSULE, LIQUID FILLED ORAL at 18:01

## 2018-08-01 RX ADMIN — FERROUS SULFATE TAB 325 MG (65 MG ELEMENTAL FE) 325 MG: 325 (65 FE) TAB at 06:48

## 2018-08-01 RX ADMIN — ACETAMINOPHEN 650 MG: 325 TABLET ORAL at 16:38

## 2018-08-01 RX ADMIN — ATORVASTATIN CALCIUM 40 MG: 40 TABLET, FILM COATED ORAL at 18:01

## 2018-08-01 RX ADMIN — BROMOCRIPTINE MESYLATE 2.5 MG: 2.5 TABLET ORAL at 21:05

## 2018-08-01 RX ADMIN — BISACODYL 5 MG: 5 TABLET, COATED ORAL at 08:49

## 2018-08-01 RX ADMIN — ACETAMINOPHEN 650 MG: 325 TABLET ORAL at 06:48

## 2018-08-01 RX ADMIN — AMLODIPINE BESYLATE 10 MG: 5 TABLET ORAL at 08:49

## 2018-08-01 RX ADMIN — DOCUSATE SODIUM 100 MG: 100 CAPSULE, LIQUID FILLED ORAL at 08:49

## 2018-08-01 RX ADMIN — CITALOPRAM HYDROBROMIDE 10 MG: 20 TABLET ORAL at 08:49

## 2018-08-01 RX ADMIN — Medication 1000 MCG: at 08:50

## 2018-08-01 RX ADMIN — METHYLPHENIDATE HYDROCHLORIDE 5 MG: 5 TABLET ORAL at 06:48

## 2018-08-01 RX ADMIN — ACETAMINOPHEN 650 MG: 325 TABLET ORAL at 12:16

## 2018-08-01 RX ADMIN — CLOPIDOGREL 75 MG: 75 TABLET, FILM COATED ORAL at 08:49

## 2018-08-01 RX ADMIN — ASPIRIN 81 MG 81 MG: 81 TABLET ORAL at 08:49

## 2018-08-01 RX ADMIN — ENOXAPARIN SODIUM 40 MG: 40 INJECTION SUBCUTANEOUS at 08:48

## 2018-08-01 NOTE — TELEPHONE ENCOUNTER
Katherine Prakash, from [facility] [address] [facility]  - [phone]  Patient recently had surgery with dr Lindsey Kruse is asking if they can perform the xrays at the rehab center  Also, when can the staples be removed?

## 2018-08-01 NOTE — TELEPHONE ENCOUNTER
Called Rehab to advise and noticed patient doesn't have a post op schedule  Pt  Needed to f/u in 2 weeks which would be 08/02/2018 to have staples removed  Can we please get pt  Schedule   Thank You

## 2018-08-01 NOTE — TELEPHONE ENCOUNTER
Daughter called asking if the facility could remove the maria esther but Dr Fady Bhandari doesn't do that  Rehab spoke with Micheal Norman and arrangements are going to be made to get patient in for the PO

## 2018-08-02 LAB
GLUCOSE SERPL-MCNC: 155 MG/DL (ref 70–99)
GLUCOSE SERPL-MCNC: 164 MG/DL (ref 70–99)
GLUCOSE SERPL-MCNC: 184 MG/DL (ref 70–99)

## 2018-08-02 PROCEDURE — 82948 REAGENT STRIP/BLOOD GLUCOSE: CPT

## 2018-08-02 RX ADMIN — DOCUSATE SODIUM 100 MG: 100 CAPSULE, LIQUID FILLED ORAL at 08:52

## 2018-08-02 RX ADMIN — ASPIRIN 81 MG 81 MG: 81 TABLET ORAL at 08:52

## 2018-08-02 RX ADMIN — ACETAMINOPHEN 650 MG: 325 TABLET ORAL at 16:26

## 2018-08-02 RX ADMIN — FERROUS SULFATE TAB 325 MG (65 MG ELEMENTAL FE) 325 MG: 325 (65 FE) TAB at 08:52

## 2018-08-02 RX ADMIN — INSULIN LISPRO 1 UNITS: 100 INJECTION, SOLUTION INTRAVENOUS; SUBCUTANEOUS at 08:53

## 2018-08-02 RX ADMIN — LIDOCAINE 1 PATCH: 50 PATCH CUTANEOUS at 08:54

## 2018-08-02 RX ADMIN — ACETAMINOPHEN 650 MG: 325 TABLET ORAL at 11:57

## 2018-08-02 RX ADMIN — AMLODIPINE BESYLATE 10 MG: 5 TABLET ORAL at 08:51

## 2018-08-02 RX ADMIN — ENOXAPARIN SODIUM 40 MG: 40 INJECTION SUBCUTANEOUS at 08:54

## 2018-08-02 RX ADMIN — BISACODYL 5 MG: 5 TABLET, COATED ORAL at 06:03

## 2018-08-02 RX ADMIN — BROMOCRIPTINE MESYLATE 2.5 MG: 2.5 TABLET ORAL at 22:06

## 2018-08-02 RX ADMIN — INSULIN LISPRO 1 UNITS: 100 INJECTION, SOLUTION INTRAVENOUS; SUBCUTANEOUS at 16:26

## 2018-08-02 RX ADMIN — METHYLPHENIDATE HYDROCHLORIDE 5 MG: 5 TABLET ORAL at 06:08

## 2018-08-02 RX ADMIN — ACETAMINOPHEN 650 MG: 325 TABLET ORAL at 06:03

## 2018-08-02 RX ADMIN — CLOPIDOGREL 75 MG: 75 TABLET, FILM COATED ORAL at 08:52

## 2018-08-02 RX ADMIN — CITALOPRAM HYDROBROMIDE 10 MG: 20 TABLET ORAL at 08:52

## 2018-08-02 RX ADMIN — Medication 1000 MCG: at 08:52

## 2018-08-02 NOTE — PROGRESS NOTES
Progress Note - Olivia Beckett 80 y o  female MRN: [id number]    Unit/Bed#: [place] 269-02 Encounter: [id number]      Assessment:     Noted    Acute right-sided weakness 7/20/2018   Abnormal gait (Chronic) 9/12/2017   Abnormal weight gain 11/26/2013   Abnormal weight loss 11/26/2013   Acromegalia (HCC) 8/15/2017   Alopecia 11/26/2013   Alzheimers disease (Chronic) 12/4/2013   Arthritis 8/15/2017   Bilateral impacted cerumen 8/15/2017   Depression 12/4/2013   Fatigue 11/26/2013   Hypercholesterolemia (Chronic) 11/26/2013   Hypertension 11/26/2013   Osteopenia 11/26/2013   Sleep disorder 11/26/2013   Type 2 diabetes mellitus, with long-term current use of insulin (HCC) (Chronic) 8/15/2017   Vitamin D deficiency (Chronic) 8/15/2017   [place] UTI (urinary tract infection) 5/8/2018   RAZIA (obstructive sleep apnea) Unknown   Accelerated hypertension 7/20/2018   S/P ORIF (open reduction internal fixation) fracture 7/20/2018   Anemia 7/21/2018   Acute deep vein thrombosis (DVT) of right lower extremity ([facility]) 7/22/2018   Constipation 7/23/2018   CVA [facility]cere[facility] vascular accident) [facility] ) 7/24/2018   Acute on chronic diastolic heart failure ([facility] ) 7/24/2018         Plan:  Continue comprehensive inpatient rehabilitation program    Medications      Report   Scheduled     Medication Dose/Rate, Route, Frequency Last Action   acetaminophen (TYLENOL) tablet 650 mg 650 mg, PO, TID AC Given: 08/02 1157   amLODIPine (NORVASC) tablet 10 mg 10 mg, PO, Daily Given: 08/02 0851   aspirin chewable tablet 81 mg 81 mg, PO, Daily Given: 08/02 0852   atorvastatin (LIPITOR) tablet 40 mg 40 mg, PO, QPM Given: 08/01 1801   bromocriptine (PARLODEL) tablet 2 5 mg 2 5 mg, PO, Daily Given: 08/01 2105   citalopram (CeleXA) tablet 10 mg 10 mg, PO, Daily Given: 08/02 7370   cloNIDine (CATAPRES) tablet 0 1 mg 0 1 mg, PO, Once Ordered   clopidogrel (PLAVIX) tablet 75 mg 75 mg, PO, Daily Given: 08/02 5777   cyanocobalamin (VITAMIN B-12) tablet 1,000 mcg 1,000 mcg, PO, Daily Given: 08/02 0852   docusate sodium (COLACE) capsule 100 mg 100 mg, PO, BID Given: 08/02 0852   enoxaparin (LOVENOX) subcutaneous injection 40 mg 40 mg, SC, Daily Given: 08/02 0854   ferrous sulfate tablet 325 mg 325 mg, PO, Daily With Breakfast Given: 08/02 0852   insulin lispro (HumaLOG) 100 units/mL subcutaneous injection 1-5 Units 1 Units, SC, TID AC Given: 08/02 0853   lidocaine (LIDODERM) 5 % patch 1 patch 1 patch, TD, Daily Medication Applied: 08/02 0854   methylphenidate (RITALIN) tablet 5 mg 5 mg, PO, Early Morning Given: 08/02 0608   senna (SENOKOT) tablet 8 6 mg 8 6 mg, PO, Daily Given: 08/01 0849        PRN     Medication Dose/Rate, Route, Frequency Last Action   bisacodyl (DULCOLAX) EC tablet 5 mg 5 mg, PO, Daily PRN Given: 08/02 0603   bisacodyl (DULCOLAX) rectal suppository 10 mg 10 mg, RE, Daily PRN Ordered   oxyCODONE (ROXICODONE) IR tablet 2 5 mg 2 5 mg, PO, Q4H PRN Ordered         Subjective:   Denied complaints    Objective:     Vitals: Blood pressure 150/66, pulse 74, temperature 97 7 °F (36 5 °C), temperature source Temporal, resp  rate 20, height 4' 11" (1 499 m), weight 70 6 kg (155 lb 9 oz), SpO2 98 %, not currently breastfeeding  ,Body mass index is 31 42 kg/m²        Intake/Output Summary (Last 24 hours) at 08/02/18 1523  Last data filed at 08/02/18 0901   Gross per 24 hour   Intake              200 ml   Output                0 ml   Net              200 ml       Physical Exam:    Normal level of arousal  Responds to commands  Neck was without rigidity  Heart rhythm is regular  Lungs demonstrated normal respiratory rate and effort  Abdomen is soft bowel sounds present  Bilateral lower extremities were without edema or calf tenderness  Surgical incision was well approximated  Right osmany paresis noted

## 2018-08-02 NOTE — TELEPHONE ENCOUNTER
I advised staff member that staples can stay in for 3 weeks postop  I will call back to get patient on the schedule tomorrow morning for 8/10 for postop appt

## 2018-08-02 NOTE — TELEPHONE ENCOUNTER
She really should come in for a post op visit  Do they have plans of discharging her in the next week?

## 2018-08-02 NOTE — PLAN OF CARE
Problem: Potential for Falls  Goal: Patient will remain free of falls  INTERVENTIONS:  - Assess patient frequently for physical needs  -  Identify cognitive and physical deficits and behaviors that affect risk of falls    -  [place] fall precautions as indicated by assessment   - Educate patient/family on patient safety including physical limitations  - Instruct patient to call for assistance with activity based on assessment  - Modify environment to reduce risk of injury  - Consider OT/PT consult to assist with strengthening/mobility   Outcome: Progressing      Problem: Prexisting or High Potential for Compromised Skin Integrity  Goal: Skin integrity is maintained or improved  INTERVENTIONS:  - Identify patients at risk for skin breakdown  - Assess and monitor skin integrity  - Assess and monitor nutrition and hydration status  - Monitor labs (i e  albumin)  - Assess for incontinence   - Turn and reposition patient  - Assist with mobility/ambulation  - Relieve pressure over bony prominences  - Avoid friction and shearing  - Provide appropriate hygiene as needed including keeping skin clean and dry  - Evaluate need for skin moisturizer/barrier cream  - Collaborate with interdisciplinary team (i e  Nutrition, Rehabilitation, etc )   - Patient/family teaching   Outcome: Progressing      Problem: PAIN - ADULT  Goal: Verbalizes/displays adequate comfort level or baseline comfort level  Interventions:  - Encourage patient to monitor pain and request assistance  - Assess pain using appropriate pain scale  - Administer analgesics based on type and severity of pain and evaluate response  - Implement non-pharmacological measures as appropriate and evaluate response  - Consider cultural and social influences on pain and pain management  - Notify physician/advanced practitioner if interventions unsuccessful or patient reports new pain   Outcome: Progressing      Problem: SAFETY ADULT  Goal: Maintain or return to baseline ADL function  INTERVENTIONS:  -  Assess patient's ability to carry out ADLs; assess patient's baseline for ADL function and identify physical deficits which impact ability to perform ADLs (bathing, care of mouth/teeth, toileting, grooming, dressing, etc )  - Assess/evaluate cause of self-care deficits   - Assess range of motion  - Assess patient's mobility; develop plan if impaired  - Assess patient's need for assistive devices and provide as appropriate  - Encourage maximum independence but intervene and supervise when necessary  ¯ Involve family in performance of ADLs  ¯ Assess for home care needs following discharge   ¯ Request OT consult to assist with ADL evaluation and planning for discharge  ¯ Provide patient education as appropriate   Outcome: Progressing    Goal: Maintain or return mobility status to optimal level  INTERVENTIONS:  - Assess patient's baseline mobility status (ambulation, transfers, stairs, etc )    - Identify cognitive and physical deficits and behaviors that affect mobility  - Identify mobility aids required to assist with transfers and/or ambulation (gait belt, sit-to-stand, lift, walker, cane, etc )  - [place] fall precautions as indicated by assessment  - Record patient progress and toleration of activity level on Mobility SBAR; progress patient to next Phase/Stage  - Instruct patient to call for assistance with activity based on assessment  -    Outcome: Progressing      Problem: DISCHARGE PLANNING  Goal: Discharge to home or other facility with appropriate resources  INTERVENTIONS:  - Identify barriers to discharge w/patient and caregiver  - Arrange for needed discharge resources and transportation as appropriate  - Identify discharge learning needs (meds, wound care, etc )  - Arrange for interpretive services to assist at discharge as needed  - Refer to Case Management Department for coordinating discharge planning if the patient needs post-hospital services based on physician/advanced practitioner order or complex needs related to functional status, cognitive ability, or social support system   Outcome: Progressing      Problem: Neurological Deficit  Goal: Neurological status is stable or improving  Interventions:  - Monitor and assess patient's level of consciousness, motor function, sensory function, and level of assistance needed for ADLs  - Monitor and report changes from baseline  Collaborate with interdisciplinary team to initiate plan and implement interventions as ordered  - Provide and maintain a safe environment  - Utilize seizure precautions  - Utilize fall precautions  - Utilize aspiration precautions  - Utilize bleeding precautions  Outcome: Progressing      Problem: Activity Intolerance/Impaired Mobility  Goal: Mobility/activity is maintained at optimum level for patient  Interventions:  - Assess and monitor patient  barriers to mobility and need for assistive/adaptive devices  - Assess patient's emotional response to limitations  - Collaborate with interdisciplinary team and initiate plans and interventions as ordered  - Encourage independent activity per ability   - Maintain proper body alignment  - Perform active/passive rom as tolerated/ordered  - Plan activities to conserve energy   - Turn patient   Outcome: Progressing      Problem: Communication Impairment  Goal: Ability to express needs and understand communication  Assess patient's communication skills and ability to understand information  Patient will demonstrate use of effective communication techniques, alternative methods of communication and understanding even if not able to speak  - Encourage communication and provide alternate methods of communication as needed  - Collaborate with case management/ for discharge needs  - Include patient/family/caregiver in decisions related to communication     Outcome: Not Progressing      Problem: Potential for Aspiration  Goal: Non-ventilated patient's risk of aspiration is minimized  Assess and monitor vital signs, respiratory status, and labs (WBC)  Monitor for signs of aspiration (tachypnea, cough, rales, wheezing, cyanosis, fever)  - Assess and monitor patient's ability to swallow  - Place patient up in chair to eat if possible  - HOB up at 90 degrees to eat if unable to get patient up into chair   - Supervise patient during oral intake  - Instruct patient to take small bites  - Instruct patient to take small single sips when taking liquids  - Follow patient-specific strategies generated by speech pathologist    Outcome: Progressing      Problem: Nutrition  Goal: Nutrition/Hydration status is improving  Monitor and assess patient's nutrition/hydration status for malnutrition (ex- brittle hair, bruises, dry skin, pale skin and conjunctiva, muscle wasting, smooth red tongue, and disorientation)  Collaborate with interdisciplinary team and initiate plan and interventions as ordered  Monitor patient's weight and dietary intake as ordered or per policy  Utilize nutrition screening tool and intervene per policy  Determine patient's food preferences and provide high-protein, high-caloric foods as appropriate  - Assist patient with eating   - Allow adequate time for meals   - Encourage patient to take dietary supplement as ordered  - Collaborate with clinical nutritionist   - Include patient/family/caregiver in decisions related to nutrition     Outcome: Progressing

## 2018-08-02 NOTE — TELEPHONE ENCOUNTER
Patient will be discharged a little bit over a week  Patient had a stroke and is rehabbing  Please advise

## 2018-08-02 NOTE — TELEPHONE ENCOUNTER
Mariana from [facility] calling that is they are again requesting due to the expense to patient for the ambulance to have staples/sutures removed and xrays performed in house  They are connected with Zain Myers and Dr Francisco Crystal be able to see xrays  The staff Dr Josefa Wall remove sutures/staples as well  Please advise if this can be done

## 2018-08-03 ENCOUNTER — TELEPHONE (OUTPATIENT)
Dept: OBGYN CLINIC | Facility: HOSPITAL | Age: 83
End: 2018-08-03

## 2018-08-03 PROBLEM — R53.1 ACUTE RIGHT-SIDED WEAKNESS: Status: RESOLVED | Noted: 2018-07-20 | Resolved: 2018-08-03

## 2018-08-03 LAB
GLUCOSE SERPL-MCNC: 129 MG/DL (ref 70–99)
GLUCOSE SERPL-MCNC: 136 MG/DL (ref 70–99)
GLUCOSE SERPL-MCNC: 168 MG/DL (ref 70–99)

## 2018-08-03 PROCEDURE — 99309 SBSQ NF CARE MODERATE MDM 30: CPT | Performed by: FAMILY MEDICINE

## 2018-08-03 PROCEDURE — 82948 REAGENT STRIP/BLOOD GLUCOSE: CPT

## 2018-08-03 RX ADMIN — METHYLPHENIDATE HYDROCHLORIDE 5 MG: 5 TABLET ORAL at 06:36

## 2018-08-03 RX ADMIN — INSULIN LISPRO 1 UNITS: 100 INJECTION, SOLUTION INTRAVENOUS; SUBCUTANEOUS at 12:12

## 2018-08-03 RX ADMIN — ACETAMINOPHEN 650 MG: 325 TABLET ORAL at 06:37

## 2018-08-03 RX ADMIN — BROMOCRIPTINE MESYLATE 2.5 MG: 2.5 TABLET ORAL at 22:04

## 2018-08-03 RX ADMIN — ASPIRIN 81 MG 81 MG: 81 TABLET ORAL at 08:53

## 2018-08-03 RX ADMIN — ACETAMINOPHEN 650 MG: 325 TABLET ORAL at 16:58

## 2018-08-03 RX ADMIN — CLOPIDOGREL 75 MG: 75 TABLET, FILM COATED ORAL at 08:53

## 2018-08-03 RX ADMIN — LIDOCAINE 1 PATCH: 50 PATCH CUTANEOUS at 08:53

## 2018-08-03 RX ADMIN — DOCUSATE SODIUM 100 MG: 100 CAPSULE, LIQUID FILLED ORAL at 08:53

## 2018-08-03 RX ADMIN — SENNOSIDES 8.6 MG: 8.6 TABLET, FILM COATED ORAL at 08:53

## 2018-08-03 RX ADMIN — Medication 1000 MCG: at 08:52

## 2018-08-03 RX ADMIN — ATORVASTATIN CALCIUM 40 MG: 40 TABLET, FILM COATED ORAL at 17:55

## 2018-08-03 RX ADMIN — DOCUSATE SODIUM 100 MG: 100 CAPSULE, LIQUID FILLED ORAL at 17:55

## 2018-08-03 RX ADMIN — FERROUS SULFATE TAB 325 MG (65 MG ELEMENTAL FE) 325 MG: 325 (65 FE) TAB at 08:52

## 2018-08-03 RX ADMIN — ENOXAPARIN SODIUM 40 MG: 40 INJECTION SUBCUTANEOUS at 08:53

## 2018-08-03 RX ADMIN — ACETAMINOPHEN 650 MG: 325 TABLET ORAL at 12:10

## 2018-08-03 RX ADMIN — CITALOPRAM HYDROBROMIDE 10 MG: 20 TABLET ORAL at 08:52

## 2018-08-03 NOTE — TELEPHONE ENCOUNTER
The Dr  In the [facility] would like to remove the staples there and provide Dr Scar Garcias with an xray as patient will not be discharged until 8/12

## 2018-08-03 NOTE — NURSING NOTE
PT will see her ortho doctor on 8/7/18 for staple removal and evaluation of the R hip  She will be picked up by [facility] ambulance at [phone] for transport to the appointment  Family is aware

## 2018-08-03 NOTE — TELEPHONE ENCOUNTER
Litzy Santos from [facility]- [phone]  Caller states appt on 8/7 needs to be changed to a later time in the day due to transportation

## 2018-08-03 NOTE — ASSESSMENT & PLAN NOTE
Supportive care  Frequent reorientation  Avoid unnecessary sedatives benzodiazepines, pain medication   Patient was previously on Aricept but not anymore

## 2018-08-03 NOTE — ASSESSMENT & PLAN NOTE
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

## 2018-08-03 NOTE — PROGRESS NOTES
Progress Note - Reatha Staggers 80 y o  female MRN: [id number]    Unit/Bed#: [place] 269-02 Encounter: [id number]      Assessment:       Noted    Acute right-sided weakness 7/20/2018   Abnormal gait (Chronic) 9/12/2017   Abnormal weight gain 11/26/2013   Abnormal weight loss 11/26/2013   Acromegalia ([facility] [address] ) 8/15/2017   Alopecia 11/26/2013   Alzheimers disease (Chronic) 12/4/2013   Arthritis 8/15/2017   Bilateral impacted cerumen 8/15/2017   Depression 12/4/2013   Fatigue 11/26/2013   Hypercholesterolemia (Chronic) 11/26/2013   Hypertension 11/26/2013   Osteopenia 11/26/2013   Sleep disorder 11/26/2013   Type 2 diabetes mellitus, with long-term current use of insulin (HCC) (Chronic) 8/15/2017   Vitamin D deficiency (Chronic) 8/15/2017   [place] UTI (urinary tract infection) 5/8/2018   RAZIA (obstructive sleep apnea) Unknown   Accelerated hypertension 7/20/2018   S/P ORIF (open reduction internal fixation) fracture 7/20/2018   Anemia 7/21/2018   Acute deep vein thrombosis (DVT) of right lower extremity ([facility]) 7/22/2018   Constipation 7/23/2018   CVA (cerebral vascular accident) ([place] [address] ) 7/24/2018   Acute on chronic diastolic heart failure ([facility] ) 7/24/2018          Plan:  Continue comprehensive inpatient rehabilitation program  Medications      Report   Scheduled     Medication Dose/Rate, Route, Frequency Last Action   acetaminophen (TYLENOL) tablet 650 mg 650 mg, PO, TID AC Given: 08/03 0637   amLODIPine (NORVASC) tablet 10 mg 10 mg, PO, Daily Given: 08/02 0851   aspirin chewable tablet 81 mg 81 mg, PO, Daily Given: 08/03 0853   atorvastatin (LIPITOR) tablet 40 mg 40 mg, PO, QPM Given: 08/01 1801   bromocriptine (PARLODEL) tablet 2 5 mg 2 5 mg, PO, Daily Given: 08/02 2206   citalopram (CeleXA) tablet 10 mg 10 mg, PO, Daily Given: 08/03 0852   cloNIDine (CATAPRES) tablet 0 1 mg 0 1 mg, PO, Once Ordered   clopidogrel (PLAVIX) tablet 75 mg 75 mg, PO, Daily Given: 08/03 0853   cyanocobalamin (VITAMIN B-12) tablet 1,000 mcg 1,000 mcg, PO, Daily Given: 08/03 0852   docusate sodium (COLACE) capsule 100 mg 100 mg, PO, BID Given: 08/03 0853   enoxaparin (LOVENOX) subcutaneous injection 40 mg 40 mg, SC, Daily Given: 08/03 0853   ferrous sulfate tablet 325 mg 325 mg, PO, Daily With Breakfast Given: 08/03 0852   insulin lispro (HumaLOG) 100 units/mL subcutaneous injection 1-5 Units 1 Units, SC, TID AC Given: 08/02 1626   lidocaine (LIDODERM) 5 % patch 1 patch 1 patch, TD, Daily Medication Applied: 08/03 0853   methylphenidate (RITALIN) tablet 5 mg 5 mg, PO, Early Morning Given: 08/03 0636   senna (SENOKOT) tablet 8 6 mg 8 6 mg, PO, Daily Given: 08/03 0853      PRN     Medication Dose/Rate, Route, Frequency Last Action   bisacodyl (DULCOLAX) EC tablet 5 mg 5 mg, PO, Daily PRN Given: 08/02 0603   bisacodyl (DULCOLAX) rectal suppository 10 mg 10 mg, RE, Daily PRN Ordered   oxyCODONE (ROXICODONE) IR tablet 2 5 mg 2 5 mg, PO, Q4H PRN Ordered            Subjective:   Appears well  Denies complaint    Objective:     Vitals: Blood pressure 138/94, pulse 72, temperature 97 9 °F (36 6 °C), temperature source Temporal, resp  rate 20, height 4' 11" (1 499 m), weight 69 4 kg (153 lb 1 oz), SpO2 99 %, not currently breastfeeding  ,Body mass index is 30 91 kg/m²        Intake/Output Summary (Last 24 hours) at 08/03/18 0945  Last data filed at 08/03/18 0901   Gross per 24 hour   Intake              360 ml   Output                0 ml   Net              360 ml       Physical Exam:  Normal level of arousal  Adult female lying in hospital bed  In no distress  Responds to commands  Neck was without rigidity  Heart rhythm is regular  Lungs demonstrated normal respiratory rate and effort  Abdomen is soft bowel sounds present  Bilateral lower extremities were without edema or calf tenderness  Surgical incision was well approximated  Right osmany paresis noted

## 2018-08-03 NOTE — TELEPHONE ENCOUNTER
Chris Arevalo to [facility], patient is schedule to see you/Venita on Tuesday, 8/7, for eval and suture removal   Thanks!

## 2018-08-03 NOTE — PROGRESS NOTES
Progress Note - Cecilia Hicks 9/22/1930, 80 y o  female MRN: [id number]    Unit/Bed#: [place] 269-02 Encounter: [id number]    Primary Care Provider: Bj Lerner MD   Date and time admitted to hospital: 7/24/2018  2:00 PM        Acute on chronic diastolic heart failure [facility])   Assessment & Plan    Currently the patient is euvolemic  No need for any Lasix  CVA (cerebral vascular accident) [facility])   Assessment & Plan    · Slowly improving   · Will continue aspirin and Plavix dual antiplatelet for 21 days, then will be on aspirin alone  Continue statin  · Has been evaluated Neurology  · Echo reviewed-no PFO  · Posterior circulation stenosis continue aspirin and Plavix for 21 days and statin discussed with the surgery vascular no other interventions  · Carotid less than 50% stenosis outpatient follow-up        S/P ORIF (open reduction internal fixation) fracture   Assessment & Plan    Continue PT OT  Pain is well controlled on Oxy IR and Tylenol  On lovenox for dvt prophylaxis        Hypertension   Assessment & Plan    Continue home blood pressure medications  Well controlled  Hypercholesterolemia   Assessment & Plan    Cont statin        Alzheimers disease   Assessment & Plan    Supportive care  Frequent reorientation  Avoid unnecessary sedatives benzodiazepines, pain medication   Patient was previously on Aricept but not anymore         * Acute right-sided weaknessresolved as of 8/3/2018   Assessment & Plan    Right upper and lower extremity able to move but is still weakness present  3 /5 upper arm and 3-/5 right lower leg  Need for 2-3 people on for from bed to chair        stop accuchecks and insulin sliding scale   VTE Pharmacologic Prophylaxis:   Pharmacologic: Enoxaparin (Lovenox)  Mechanical VTE Prophylaxis in Place: Yes    Patient Centered Rounds: I have performed bedside rounds with nursing staff today      Discussions with Specialists or Other Care Team Provider: none    Education and Discussions with Family / Patient: patient explained abt her hospital course    Time Spent for Care: 20 minutes  More than 50% of total time spent on counseling and coordination of care as described above  Current Length of Stay: 0 day(s)    Current Patient Status: Outpatient   Certification Statement: The patient will continue to require additional inpatient hospital stay due to rehab needs    Discharge Plan: as per rehab    Code Status: Level 3 - DNAR and DNI      Subjective:   Patient denies any chest pain or shortness of breath  She complains of mild pain in her right hip after therapy    Objective:     Vitals:   Temp (24hrs), Av 1 °F (36 2 °C), Min:96 3 °F (35 7 °C), Max:97 9 °F (36 6 °C)    HR:  [66-72] 72  Resp:  [18-20] 20  BP: (120-138)/(73-94) 138/94  SpO2:  [99 %-100 %] 99 %  Body mass index is 30 91 kg/m²  Input and Output Summary (last 24 hours): Intake/Output Summary (Last 24 hours) at 18 1655  Last data filed at 18 0901   Gross per 24 hour   Intake              360 ml   Output                0 ml   Net              360 ml       Physical Exam:     Physical Exam   Constitutional: She appears well-developed and well-nourished  HENT:   Head: Normocephalic and atraumatic  Right Ear: External ear normal    Left Ear: External ear normal    Mouth/Throat: Oropharynx is clear and moist    Eyes: Conjunctivae and EOM are normal  Pupils are equal, round, and reactive to light  Neck: Normal range of motion  Neck supple  Cardiovascular: Normal rate, regular rhythm, normal heart sounds and intact distal pulses  Pulmonary/Chest: Effort normal and breath sounds normal    Abdominal: Soft  Bowel sounds are normal  She exhibits no mass  There is no tenderness  There is no rebound and no guarding  Genitourinary:   Genitourinary Comments: deferred   Musculoskeletal: Normal range of motion     Clean incision with sutures on the right hip with dressing on   Neurological: She is alert  She has normal reflexes  Skin: Skin is warm and dry  No rash noted  Psychiatric: She has a normal mood and affect  Nursing note and vitals reviewed  Additional Data:     Labs: Invalid input(s): LABALBU        Results from last 7 days  Lab Units 08/03/18  1642 08/03/18  1203 08/03/18  0625 08/02/18  1602 08/02/18  1157 08/02/18  0604 08/01/18  1611 08/01/18  8290 07/31/18  1651 07/31/18  1202 07/31/18  0634 07/30/18  1635   POC GLUCOSE mg/dl 136* 168* 129* 184* 164* 155* 135* 147* 148* 191* 144* 163*             * I Have Reviewed All Lab Data Listed Above  * Additional Pertinent Lab Tests Reviewed:  [facility] Admission Reviewed    Imaging:    Imaging Reports Reviewed Today Include: none  Imaging Personally Reviewed by Myself Includes:  none    Recent Cultures (last 7 days):           Last 24 Hours Medication List:     Current Facility-Administered Medications:  acetaminophen 650 mg Oral TID AC Justin Rodriguez MD   amLODIPine 10 mg Oral Daily Justin Rodriguez MD   aspirin 81 mg Oral Daily Justin Rodriguez MD   atorvastatin 40 mg Oral QPM Justin Rodriguez MD   bisacodyl 5 mg Oral Daily PRN Justin Rodriguez MD   bisacodyl 10 mg Rectal Daily PRN Justin Rodriguez MD   bromocriptine 2 5 mg Oral Daily Justin Rodriguez MD   citalopram 10 mg Oral Daily Justin Rodriguez MD   cloNIDine 0 1 mg Oral Once Germaine Rebolledo MD   clopidogrel 75 mg Oral Daily Justin Rodriguez MD   cyanocobalamin 1,000 mcg Oral Daily Justin Rodriguez MD   docusate sodium 100 mg Oral BID Justin Rodriguez MD   enoxaparin 40 mg Subcutaneous Daily Justin Rodriguez MD   ferrous sulfate 325 mg Oral Daily With Breakfast Justin Rodriguez MD   lidocaine 1 patch Transdermal Daily Justin Rodriguez MD   methylphenidate 5 mg Oral Early Morning Justin Rodriguez MD   oxyCODONE 2 5 mg Oral Q4H PRN Justin Rodriguez MD   senna 1 tablet Oral Daily Justin Rodriguez MD        Today, Patient Was Seen By: Almaz Alvarado, MD    ** Please Note: Dictation voice to text software may have been used in the creation of this document   **

## 2018-08-04 RX ADMIN — CITALOPRAM HYDROBROMIDE 10 MG: 20 TABLET ORAL at 08:37

## 2018-08-04 RX ADMIN — ATORVASTATIN CALCIUM 40 MG: 40 TABLET, FILM COATED ORAL at 17:33

## 2018-08-04 RX ADMIN — ENOXAPARIN SODIUM 40 MG: 40 INJECTION SUBCUTANEOUS at 08:38

## 2018-08-04 RX ADMIN — BROMOCRIPTINE MESYLATE 2.5 MG: 2.5 TABLET ORAL at 21:38

## 2018-08-04 RX ADMIN — BISACODYL 5 MG: 5 TABLET, COATED ORAL at 21:39

## 2018-08-04 RX ADMIN — DOCUSATE SODIUM 100 MG: 100 CAPSULE, LIQUID FILLED ORAL at 17:33

## 2018-08-04 RX ADMIN — ACETAMINOPHEN 650 MG: 325 TABLET ORAL at 06:08

## 2018-08-04 RX ADMIN — Medication 1000 MCG: at 08:37

## 2018-08-04 RX ADMIN — ASPIRIN 81 MG 81 MG: 81 TABLET ORAL at 08:37

## 2018-08-04 RX ADMIN — DOCUSATE SODIUM 100 MG: 100 CAPSULE, LIQUID FILLED ORAL at 08:30

## 2018-08-04 RX ADMIN — ACETAMINOPHEN 650 MG: 325 TABLET ORAL at 12:07

## 2018-08-04 RX ADMIN — FERROUS SULFATE TAB 325 MG (65 MG ELEMENTAL FE) 325 MG: 325 (65 FE) TAB at 08:37

## 2018-08-04 RX ADMIN — METHYLPHENIDATE HYDROCHLORIDE 5 MG: 5 TABLET ORAL at 06:08

## 2018-08-04 RX ADMIN — ACETAMINOPHEN 650 MG: 325 TABLET ORAL at 17:32

## 2018-08-04 RX ADMIN — CLOPIDOGREL 75 MG: 75 TABLET, FILM COATED ORAL at 08:37

## 2018-08-04 RX ADMIN — LIDOCAINE 1 PATCH: 50 PATCH CUTANEOUS at 08:38

## 2018-08-04 RX ADMIN — AMLODIPINE BESYLATE 10 MG: 5 TABLET ORAL at 08:37

## 2018-08-04 RX ADMIN — SENNOSIDES 8.6 MG: 8.6 TABLET, FILM COATED ORAL at 08:37

## 2018-08-04 NOTE — PLAN OF CARE
Problem: Potential for Falls  Goal: Patient will remain free of falls  INTERVENTIONS:  - Assess patient frequently for physical needs  -  Identify cognitive and physical deficits and behaviors that affect risk of falls    -  [place] fall precautions as indicated by assessment   - Educate patient/family on patient safety including physical limitations  - Instruct patient to call for assistance with activity based on assessment  - Modify environment to reduce risk of injury  - Consider OT/PT consult to assist with strengthening/mobility   Outcome: Progressing      Problem: Prexisting or High Potential for Compromised Skin Integrity  Goal: Skin integrity is maintained or improved  INTERVENTIONS:  - Identify patients at risk for skin breakdown  - Assess and monitor skin integrity  - Assess and monitor nutrition and hydration status  - Monitor labs (i e  albumin)  - Assess for incontinence   - Turn and reposition patient  - Assist with mobility/ambulation  - Relieve pressure over bony prominences  - Avoid friction and shearing  - Provide appropriate hygiene as needed including keeping skin clean and dry  - Evaluate need for skin moisturizer/barrier cream  - Collaborate with interdisciplinary team (i e  Nutrition, Rehabilitation, etc )   - Patient/family teaching   Outcome: Progressing      Problem: PAIN - ADULT  Goal: Verbalizes/displays adequate comfort level or baseline comfort level  Interventions:  - Encourage patient to monitor pain and request assistance  - Assess pain using appropriate pain scale  - Administer analgesics based on type and severity of pain and evaluate response  - Implement non-pharmacological measures as appropriate and evaluate response  - Consider cultural and social influences on pain and pain management  - Notify physician/advanced practitioner if interventions unsuccessful or patient reports new pain   Outcome: Progressing      Problem: SAFETY ADULT  Goal: Maintain or return to baseline ADL function  INTERVENTIONS:  -  Assess patient's ability to carry out ADLs; assess patient's baseline for ADL function and identify physical deficits which impact ability to perform ADLs (bathing, care of mouth/teeth, toileting, grooming, dressing, etc )  - Assess/evaluate cause of self-care deficits   - Assess range of motion  - Assess patient's mobility; develop plan if impaired  - Assess patient's need for assistive devices and provide as appropriate  - Encourage maximum independence but intervene and supervise when necessary  ¯ Involve family in performance of ADLs  ¯ Assess for home care needs following discharge   ¯ Request OT consult to assist with ADL evaluation and planning for discharge  ¯ Provide patient education as appropriate   Outcome: Progressing    Goal: Maintain or return mobility status to optimal level  INTERVENTIONS:  - Assess patient's baseline mobility status (ambulation, transfers, stairs, etc )    - Identify cognitive and physical deficits and behaviors that affect mobility  - Identify mobility aids required to assist with transfers and/or ambulation (gait belt, sit-to-stand, lift, walker, cane, etc )  - [place] fall precautions as indicated by assessment  - Record patient progress and toleration of activity level on Mobility SBAR; progress patient to next Phase/Stage  - Instruct patient to call for assistance with activity based on assessment  -    Outcome: Progressing      Problem: DISCHARGE PLANNING  Goal: Discharge to home or other facility with appropriate resources  INTERVENTIONS:  - Identify barriers to discharge w/patient and caregiver  - Arrange for needed discharge resources and transportation as appropriate  - Identify discharge learning needs (meds, wound care, etc )  - Arrange for interpretive services to assist at discharge as needed  - Refer to Case Management Department for coordinating discharge planning if the patient needs post-hospital services based on physician/advanced practitioner order or complex needs related to functional status, cognitive ability, or social support system   Outcome: Progressing      Problem: Neurological Deficit  Goal: Neurological status is stable or improving  Interventions:  - Monitor and assess patient's level of consciousness, motor function, sensory function, and level of assistance needed for ADLs  - Monitor and report changes from baseline  Collaborate with interdisciplinary team to initiate plan and implement interventions as ordered  - Provide and maintain a safe environment  - Utilize seizure precautions  - Utilize fall precautions  - Utilize aspiration precautions  - Utilize bleeding precautions  Outcome: Progressing      Problem: Activity Intolerance/Impaired Mobility  Goal: Mobility/activity is maintained at optimum level for patient  Interventions:  - Assess and monitor patient  barriers to mobility and need for assistive/adaptive devices  - Assess patient's emotional response to limitations  - Collaborate with interdisciplinary team and initiate plans and interventions as ordered  - Encourage independent activity per ability   - Maintain proper body alignment  - Perform active/passive rom as tolerated/ordered  - Plan activities to conserve energy   - Turn patient   Outcome: Progressing      Problem: Communication Impairment  Goal: Ability to express needs and understand communication  Assess patient's communication skills and ability to understand information  Patient will demonstrate use of effective communication techniques, alternative methods of communication and understanding even if not able to speak  - Encourage communication and provide alternate methods of communication as needed  - Collaborate with case management/ for discharge needs  - Include patient/family/caregiver in decisions related to communication     Outcome: Progressing      Problem: Potential for Aspiration  Goal: Non-ventilated patient's risk of aspiration is minimized  Assess and monitor vital signs, respiratory status, and labs (WBC)  Monitor for signs of aspiration (tachypnea, cough, rales, wheezing, cyanosis, fever)  - Assess and monitor patient's ability to swallow  - Place patient up in chair to eat if possible  - HOB up at 90 degrees to eat if unable to get patient up into chair   - Supervise patient during oral intake  - Instruct patient to take small bites  - Instruct patient to take small single sips when taking liquids  - Follow patient-specific strategies generated by speech pathologist    Outcome: Progressing      Problem: Nutrition  Goal: Nutrition/Hydration status is improving  Monitor and assess patient's nutrition/hydration status for malnutrition (ex- brittle hair, bruises, dry skin, pale skin and conjunctiva, muscle wasting, smooth red tongue, and disorientation)  Collaborate with interdisciplinary team and initiate plan and interventions as ordered  Monitor patient's weight and dietary intake as ordered or per policy  Utilize nutrition screening tool and intervene per policy  Determine patient's food preferences and provide high-protein, high-caloric foods as appropriate  - Assist patient with eating   - Allow adequate time for meals   - Encourage patient to take dietary supplement as ordered  - Collaborate with clinical nutritionist   - Include patient/family/caregiver in decisions related to nutrition     Outcome: Progressing

## 2018-08-04 NOTE — PROGRESS NOTES
Progress Note - Awilda Old 80 y o  female MRN: [id number]    Unit/Bed#: LISA 269-02 Encounter: [id number]      Assessment:  CVA right hemiparesis, Alzheimers dementia, DM, Depression, Right hip fx s/p ORIF    Plan:  Continue comprehensive rehabilitation PT, OT, Speech  WBAT RLE  Continue Clonidine, Norvasc and monitor BP/HR  ASA/Plavix CVA prophylaxis  Pain control with tylenol, OxyIR prn, lidoderm patch    Subjective:   No new complaints today  Denies CP, SOB, no GI complaints, no nausea  Slept well overnight  Objective:     Vitals: Blood pressure 142/65, pulse 69, temperature 97 9 °F (36 6 °C), temperature source Temporal, resp  rate 18, height 4' 11" (1 499 m), weight 69 1 kg (152 lb 6 4 oz), SpO2 98 %, not currently breastfeeding  ,Body mass index is 30 78 kg/m²  Intake/Output Summary (Last 24 hours) at 08/04/18 0745  Last data filed at 08/03/18 0901   Gross per 24 hour   Intake              360 ml   Output                0 ml   Net              360 ml       Physical Exam: General appearance: Initially sleeping, but easily arousable, oriented, answers questions appropriately  Lungs: clear to auscultation bilaterally  Heart: regular rate and rhythm, S1, S2 normal, no murmur, click, rub or gallop and regular rate and rhythm  Abdomen: soft, non-tender; bowel sounds normal; no masses,  no organomegaly  Extremities: extremities normal, warm and well-perfused; no cyanosis, clubbing, or edema  Neurologic: Motor: R weakness 3/5 RUE and DF, EHL     Invasive Devices          No matching active lines, drains, or airways            Lab, Imaging and other studies: I have personally reviewed pertinent reports      VTE Pharmacologic Prophylaxis: Enoxaparin (Lovenox)  VTE Mechanical Prophylaxis: sequential compression device

## 2018-08-05 RX ADMIN — ACETAMINOPHEN 650 MG: 325 TABLET ORAL at 06:02

## 2018-08-05 RX ADMIN — FERROUS SULFATE TAB 325 MG (65 MG ELEMENTAL FE) 325 MG: 325 (65 FE) TAB at 08:20

## 2018-08-05 RX ADMIN — SENNOSIDES 8.6 MG: 8.6 TABLET, FILM COATED ORAL at 08:22

## 2018-08-05 RX ADMIN — ENOXAPARIN SODIUM 40 MG: 40 INJECTION SUBCUTANEOUS at 08:20

## 2018-08-05 RX ADMIN — ATORVASTATIN CALCIUM 40 MG: 40 TABLET, FILM COATED ORAL at 17:20

## 2018-08-05 RX ADMIN — ACETAMINOPHEN 650 MG: 325 TABLET ORAL at 12:18

## 2018-08-05 RX ADMIN — CLOPIDOGREL 75 MG: 75 TABLET, FILM COATED ORAL at 08:22

## 2018-08-05 RX ADMIN — ACETAMINOPHEN 650 MG: 325 TABLET ORAL at 17:20

## 2018-08-05 RX ADMIN — METHYLPHENIDATE HYDROCHLORIDE 5 MG: 5 TABLET ORAL at 06:03

## 2018-08-05 RX ADMIN — Medication 1000 MCG: at 08:22

## 2018-08-05 RX ADMIN — DOCUSATE SODIUM 100 MG: 100 CAPSULE, LIQUID FILLED ORAL at 08:22

## 2018-08-05 RX ADMIN — ASPIRIN 81 MG 81 MG: 81 TABLET ORAL at 08:22

## 2018-08-05 RX ADMIN — BROMOCRIPTINE MESYLATE 2.5 MG: 2.5 TABLET ORAL at 21:20

## 2018-08-05 RX ADMIN — DOCUSATE SODIUM 100 MG: 100 CAPSULE, LIQUID FILLED ORAL at 17:20

## 2018-08-05 RX ADMIN — CITALOPRAM HYDROBROMIDE 10 MG: 20 TABLET ORAL at 08:23

## 2018-08-05 RX ADMIN — LIDOCAINE 1 PATCH: 50 PATCH CUTANEOUS at 08:20

## 2018-08-06 RX ADMIN — DOCUSATE SODIUM 100 MG: 100 CAPSULE, LIQUID FILLED ORAL at 09:08

## 2018-08-06 RX ADMIN — SENNOSIDES 8.6 MG: 8.6 TABLET, FILM COATED ORAL at 08:51

## 2018-08-06 RX ADMIN — ACETAMINOPHEN 650 MG: 325 TABLET ORAL at 06:07

## 2018-08-06 RX ADMIN — LIDOCAINE 1 PATCH: 50 PATCH CUTANEOUS at 08:51

## 2018-08-06 RX ADMIN — FERROUS SULFATE TAB 325 MG (65 MG ELEMENTAL FE) 325 MG: 325 (65 FE) TAB at 08:52

## 2018-08-06 RX ADMIN — DOCUSATE SODIUM 100 MG: 100 CAPSULE, LIQUID FILLED ORAL at 18:12

## 2018-08-06 RX ADMIN — ASPIRIN 81 MG 81 MG: 81 TABLET ORAL at 08:48

## 2018-08-06 RX ADMIN — CITALOPRAM HYDROBROMIDE: 20 TABLET ORAL at 08:49

## 2018-08-06 RX ADMIN — Medication 1000 MCG: at 08:50

## 2018-08-06 RX ADMIN — ACETAMINOPHEN 650 MG: 325 TABLET ORAL at 12:00

## 2018-08-06 RX ADMIN — ATORVASTATIN CALCIUM 40 MG: 40 TABLET, FILM COATED ORAL at 18:11

## 2018-08-06 RX ADMIN — CLOPIDOGREL 75 MG: 75 TABLET, FILM COATED ORAL at 08:50

## 2018-08-06 RX ADMIN — ACETAMINOPHEN 650 MG: 325 TABLET ORAL at 18:11

## 2018-08-06 RX ADMIN — METHYLPHENIDATE HYDROCHLORIDE 5 MG: 5 TABLET ORAL at 06:08

## 2018-08-06 RX ADMIN — ENOXAPARIN SODIUM 40 MG: 40 INJECTION SUBCUTANEOUS at 09:08

## 2018-08-06 NOTE — PLAN OF CARE
Problem: Potential for Falls  Goal: Patient will remain free of falls  INTERVENTIONS:  - Assess patient frequently for physical needs  -  Identify cognitive and physical deficits and behaviors that affect risk of falls    -  [place] fall precautions as indicated by assessment   - Educate patient/family on patient safety including physical limitations  - Instruct patient to call for assistance with activity based on assessment  - Modify environment to reduce risk of injury  - Consider OT/PT consult to assist with strengthening/mobility   Outcome: Progressing      Problem: Prexisting or High Potential for Compromised Skin Integrity  Goal: Skin integrity is maintained or improved  INTERVENTIONS:  - Identify patients at risk for skin breakdown  - Assess and monitor skin integrity  - Assess and monitor nutrition and hydration status  - Monitor labs (i e  albumin)  - Assess for incontinence   - Turn and reposition patient  - Assist with mobility/ambulation  - Relieve pressure over bony prominences  - Avoid friction and shearing  - Provide appropriate hygiene as needed including keeping skin clean and dry  - Evaluate need for skin moisturizer/barrier cream  - Collaborate with interdisciplinary team (i e  Nutrition, Rehabilitation, etc )   - Patient/family teaching   Outcome: Progressing      Problem: PAIN - ADULT  Goal: Verbalizes/displays adequate comfort level or baseline comfort level  Interventions:  - Encourage patient to monitor pain and request assistance  - Assess pain using appropriate pain scale  - Administer analgesics based on type and severity of pain and evaluate response  - Implement non-pharmacological measures as appropriate and evaluate response  - Consider cultural and social influences on pain and pain management  - Notify physician/advanced practitioner if interventions unsuccessful or patient reports new pain   Outcome: Progressing      Problem: SAFETY ADULT  Goal: Maintain or return to baseline ADL function  INTERVENTIONS:  -  Assess patient's ability to carry out ADLs; assess patient's baseline for ADL function and identify physical deficits which impact ability to perform ADLs (bathing, care of mouth/teeth, toileting, grooming, dressing, etc )  - Assess/evaluate cause of self-care deficits   - Assess range of motion  - Assess patient's mobility; develop plan if impaired  - Assess patient's need for assistive devices and provide as appropriate  - Encourage maximum independence but intervene and supervise when necessary  ¯ Involve family in performance of ADLs  ¯ Assess for home care needs following discharge   ¯ Request OT consult to assist with ADL evaluation and planning for discharge  ¯ Provide patient education as appropriate   Outcome: Progressing    Goal: Maintain or return mobility status to optimal level  INTERVENTIONS:  - Assess patient's baseline mobility status (ambulation, transfers, stairs, etc )    - Identify cognitive and physical deficits and behaviors that affect mobility  - Identify mobility aids required to assist with transfers and/or ambulation (gait belt, sit-to-stand, lift, walker, cane, etc )  - [place] fall precautions as indicated by assessment  - Record patient progress and toleration of activity level on Mobility SBAR; progress patient to next Phase/Stage  - Instruct patient to call for assistance with activity based on assessment  -    Outcome: Progressing      Problem: DISCHARGE PLANNING  Goal: Discharge to home or other facility with appropriate resources  INTERVENTIONS:  - Identify barriers to discharge w/patient and caregiver  - Arrange for needed discharge resources and transportation as appropriate  - Identify discharge learning needs (meds, wound care, etc )  - Arrange for interpretive services to assist at discharge as needed  - Refer to Case Management Department for coordinating discharge planning if the patient needs post-hospital services based on physician/advanced practitioner order or complex needs related to functional status, cognitive ability, or social support system   Outcome: Progressing      Problem: Neurological Deficit  Goal: Neurological status is stable or improving  Interventions:  - Monitor and assess patient's level of consciousness, motor function, sensory function, and level of assistance needed for ADLs  - Monitor and report changes from baseline  Collaborate with interdisciplinary team to initiate plan and implement interventions as ordered  - Provide and maintain a safe environment  - Utilize seizure precautions  - Utilize fall precautions  - Utilize aspiration precautions  - Utilize bleeding precautions  Outcome: Progressing      Problem: Activity Intolerance/Impaired Mobility  Goal: Mobility/activity is maintained at optimum level for patient  Interventions:  - Assess and monitor patient  barriers to mobility and need for assistive/adaptive devices  - Assess patient's emotional response to limitations  - Collaborate with interdisciplinary team and initiate plans and interventions as ordered  - Encourage independent activity per ability   - Maintain proper body alignment  - Perform active/passive rom as tolerated/ordered  - Plan activities to conserve energy   - Turn patient   Outcome: Progressing      Problem: Communication Impairment  Goal: Ability to express needs and understand communication  Assess patient's communication skills and ability to understand information  Patient will demonstrate use of effective communication techniques, alternative methods of communication and understanding even if not able to speak  - Encourage communication and provide alternate methods of communication as needed  - Collaborate with case management/ for discharge needs  - Include patient/family/caregiver in decisions related to communication     Outcome: Progressing      Problem: Potential for Aspiration  Goal: Non-ventilated patient's risk of aspiration is minimized  Assess and monitor vital signs, respiratory status, and labs (WBC)  Monitor for signs of aspiration (tachypnea, cough, rales, wheezing, cyanosis, fever)  - Assess and monitor patient's ability to swallow  - Place patient up in chair to eat if possible  - HOB up at 90 degrees to eat if unable to get patient up into chair   - Supervise patient during oral intake  - Instruct patient to take small bites  - Instruct patient to take small single sips when taking liquids  - Follow patient-specific strategies generated by speech pathologist    Outcome: Progressing      Problem: Nutrition  Goal: Nutrition/Hydration status is improving  Monitor and assess patient's nutrition/hydration status for malnutrition (ex- brittle hair, bruises, dry skin, pale skin and conjunctiva, muscle wasting, smooth red tongue, and disorientation)  Collaborate with interdisciplinary team and initiate plan and interventions as ordered  Monitor patient's weight and dietary intake as ordered or per policy  Utilize nutrition screening tool and intervene per policy  Determine patient's food preferences and provide high-protein, high-caloric foods as appropriate  - Assist patient with eating   - Allow adequate time for meals   - Encourage patient to take dietary supplement as ordered  - Collaborate with clinical nutritionist   - Include patient/family/caregiver in decisions related to nutrition     Outcome: Progressing

## 2018-08-07 ENCOUNTER — OFFICE VISIT (OUTPATIENT)
Dept: OBGYN CLINIC | Facility: HOSPITAL | Age: 83
End: 2018-08-07

## 2018-08-07 ENCOUNTER — HOSPITAL ENCOUNTER (OUTPATIENT)
Dept: RADIOLOGY | Facility: HOSPITAL | Age: 83
Discharge: HOME | End: 2018-08-07
Attending: ORTHOPAEDIC SURGERY
Payer: MEDICARE

## 2018-08-07 VITALS — HEART RATE: 66 BPM | SYSTOLIC BLOOD PRESSURE: 121 MMHG | DIASTOLIC BLOOD PRESSURE: 68 MMHG

## 2018-08-07 DIAGNOSIS — Z48.89 AFTERCARE FOLLOWING SURGERY: Primary | ICD-10-CM

## 2018-08-07 DIAGNOSIS — Z48.89 AFTERCARE FOLLOWING SURGERY: ICD-10-CM

## 2018-08-07 LAB
ALBUMIN SERPL BCP-MCNC: 3.9 G/DL (ref 3–5.2)
ALP SERPL-CCNC: 153 U/L (ref 43–122)
ALT SERPL W P-5'-P-CCNC: 42 U/L (ref 9–52)
ANION GAP SERPL CALCULATED.3IONS-SCNC: 7 MMOL/L (ref 5–14)
AST SERPL W P-5'-P-CCNC: 39 U/L (ref 14–36)
BASOPHILS # BLD AUTO: 0.1 THOUSANDS/ΜL (ref 0–0.1)
BASOPHILS NFR BLD AUTO: 1 % (ref 0–1)
BILIRUB SERPL-MCNC: 0.3 MG/DL
BUN SERPL-MCNC: 17 MG/DL (ref 5–25)
CALCIUM SERPL-MCNC: 11.3 MG/DL (ref 8.4–10.2)
CHLORIDE SERPL-SCNC: 105 MMOL/L (ref 97–108)
CO2 SERPL-SCNC: 29 MMOL/L (ref 22–30)
CREAT SERPL-MCNC: 0.44 MG/DL (ref 0.6–1.2)
CREAT SERPL-MCNC: 0.45 MG/DL (ref 0.6–1.2)
EOSINOPHIL # BLD AUTO: 0.3 THOUSAND/ΜL (ref 0–0.4)
EOSINOPHIL NFR BLD AUTO: 3 % (ref 0–6)
ERYTHROCYTE [DISTWIDTH] IN BLOOD BY AUTOMATED COUNT: 15.7 %
GFR SERPL CREATININE-BSD FRML MDRD: 90 ML/MIN/1.73SQ M
GFR SERPL CREATININE-BSD FRML MDRD: 91 ML/MIN/1.73SQ M
GLUCOSE SERPL-MCNC: 108 MG/DL (ref 70–99)
HCT VFR BLD AUTO: 31.9 % (ref 36–46)
HGB BLD-MCNC: 10.7 G/DL (ref 12–16)
LYMPHOCYTES # BLD AUTO: 2.1 THOUSANDS/ΜL (ref 0.5–4)
LYMPHOCYTES NFR BLD AUTO: 25 % (ref 20–50)
MAGNESIUM SERPL-MCNC: 1.7 MG/DL (ref 1.6–2.3)
MCH RBC QN AUTO: 30.3 PG (ref 26.8–34.3)
MCHC RBC AUTO-ENTMCNC: 33.5 G/DL (ref 31.4–37.4)
MCV RBC AUTO: 91 FL (ref 82–98)
MONOCYTES # BLD AUTO: 0.6 THOUSAND/ΜL (ref 0.2–0.9)
MONOCYTES NFR BLD AUTO: 7 % (ref 1–10)
NEUTROPHILS # BLD AUTO: 5.6 THOUSANDS/ΜL (ref 1.8–7.8)
NEUTS SEG NFR BLD AUTO: 65 % (ref 45–65)
PLATELET # BLD AUTO: 194 THOUSANDS/UL (ref 150–450)
PMV BLD AUTO: 6.9 FL (ref 8.9–12.7)
POTASSIUM SERPL-SCNC: 4 MMOL/L (ref 3.6–5)
PROT SERPL-MCNC: 6.8 G/DL (ref 5.9–8.4)
RBC # BLD AUTO: 3.53 MILLION/UL (ref 4–5.2)
SODIUM SERPL-SCNC: 141 MMOL/L (ref 137–147)
WBC # BLD AUTO: 8.6 THOUSAND/UL (ref 4.31–10.16)

## 2018-08-07 PROCEDURE — 73502 X-RAY EXAM HIP UNI 2-3 VIEWS: CPT

## 2018-08-07 PROCEDURE — 99024 POSTOP FOLLOW-UP VISIT: CPT | Performed by: ORTHOPAEDIC SURGERY

## 2018-08-07 PROCEDURE — 80053 COMPREHEN METABOLIC PANEL: CPT | Performed by: PHYSICAL MEDICINE & REHABILITATION

## 2018-08-07 PROCEDURE — 85025 COMPLETE CBC W/AUTO DIFF WBC: CPT | Performed by: PHYSICAL MEDICINE & REHABILITATION

## 2018-08-07 PROCEDURE — 82565 ASSAY OF CREATININE: CPT | Performed by: PHYSICAL MEDICINE & REHABILITATION

## 2018-08-07 PROCEDURE — 82306 VITAMIN D 25 HYDROXY: CPT | Performed by: FAMILY MEDICINE

## 2018-08-07 PROCEDURE — 83735 ASSAY OF MAGNESIUM: CPT | Performed by: PHYSICAL MEDICINE & REHABILITATION

## 2018-08-07 RX ORDER — BROMOCRIPTINE MESYLATE 2.5 MG/1
2.5 TABLET ORAL
Status: DISCONTINUED | OUTPATIENT
Start: 2018-08-07 | End: 2018-08-13 | Stop reason: HOSPADM

## 2018-08-07 RX ADMIN — BROMOCRIPTINE MESYLATE 2.5 MG: 2.5 TABLET ORAL at 18:11

## 2018-08-07 RX ADMIN — DOCUSATE SODIUM 100 MG: 100 CAPSULE, LIQUID FILLED ORAL at 18:11

## 2018-08-07 RX ADMIN — FERROUS SULFATE TAB 325 MG (65 MG ELEMENTAL FE) 325 MG: 325 (65 FE) TAB at 08:37

## 2018-08-07 RX ADMIN — ACETAMINOPHEN 650 MG: 325 TABLET ORAL at 06:26

## 2018-08-07 RX ADMIN — LIDOCAINE 1 PATCH: 50 PATCH CUTANEOUS at 08:38

## 2018-08-07 RX ADMIN — CLOPIDOGREL 75 MG: 75 TABLET, FILM COATED ORAL at 08:37

## 2018-08-07 RX ADMIN — DOCUSATE SODIUM 100 MG: 100 CAPSULE, LIQUID FILLED ORAL at 08:37

## 2018-08-07 RX ADMIN — ASPIRIN 81 MG 81 MG: 81 TABLET ORAL at 08:37

## 2018-08-07 RX ADMIN — CITALOPRAM HYDROBROMIDE 10 MG: 20 TABLET ORAL at 08:37

## 2018-08-07 RX ADMIN — ATORVASTATIN CALCIUM 40 MG: 40 TABLET, FILM COATED ORAL at 18:11

## 2018-08-07 RX ADMIN — METHYLPHENIDATE HYDROCHLORIDE 5 MG: 5 TABLET ORAL at 06:26

## 2018-08-07 RX ADMIN — ENOXAPARIN SODIUM 40 MG: 40 INJECTION SUBCUTANEOUS at 08:38

## 2018-08-07 RX ADMIN — Medication 1000 MCG: at 08:37

## 2018-08-07 RX ADMIN — ACETAMINOPHEN 650 MG: 325 TABLET ORAL at 16:58

## 2018-08-07 RX ADMIN — SENNOSIDES 8.6 MG: 8.6 TABLET, FILM COATED ORAL at 08:37

## 2018-08-07 NOTE — NURSING NOTE
Pt has CBC an creatinine ordered, 3 attempts made to collect specimens  Will retry after the pt returns from Ortho Doctor appointment scheduled for 1115 today

## 2018-08-07 NOTE — PROGRESS NOTES
Orthopedics   Daryle Dikes 80 y o  female MRN: [id number]  Unit/Bed#: @NEO@      Subjective:  80 y  o female post operative day 20 right femoral intramedullary nail  Pt doing well  Unfortunately patient was discovered to have a left-sided lacunar infarct postoperatively when she was discharged to rehab, and she now continues to have right-sided upper and lower extremity weakness  Per reports of medical team with her, she is not able to ambulate well or use a walker, but she is able to mobilize with therapy with difficulty  Labs:    0  Lab Value Date/Time   HCT 32 6 (L) 07/26/2018 1411   HCT 28 6 (L) 07/23/2018 0506   HCT 27 8 (L) 07/22/2018 0441   HCT 43 3 08/17/2015 0931   HCT 42 0 02/27/2015 0931   HCT 40 3 10/16/2014 0954   HGB 11 0 (L) 07/26/2018 1411   HGB 9 5 (L) 07/23/2018 0506   HGB 9 5 (L) 07/22/2018 0441   HGB 14 6 08/17/2015 0931   HGB 13 6 02/27/2015 0931   HGB 12 9 10/16/2014 0954   INR 1 03 07/21/2018 0435   WBC 9 50 07/26/2018 1411   WBC 6 50 07/23/2018 0506   WBC 7 90 07/22/2018 0441   WBC 6 84 08/17/2015 0931   WBC 5 79 02/27/2015 0931   WBC 5 25 10/16/2014 0954   CRP 59 0 (H) 07/21/2018 0435       Meds:  No current facility-administered medications for this visit  No current outpatient prescriptions on file      Facility-Administered Medications Ordered in Other Visits:     acetaminophen (TYLENOL) tablet 650 mg, 650 mg, Oral, TID AC, Carmen Cochran MD, 650 mg at 08/07/18 0626    amLODIPine (NORVASC) tablet 10 mg, 10 mg, Oral, Daily, Carmen Cochran MD, 10 mg at 08/04/18 3192    aspirin chewable tablet 81 mg, 81 mg, Oral, Daily, Carmen Cochran MD, 81 mg at 08/07/18 0837    atorvastatin (LIPITOR) tablet 40 mg, 40 mg, Oral, QPM, Carmen Cochran MD, 40 mg at 08/06/18 1811    bisacodyl (DULCOLAX) EC tablet 5 mg, 5 mg, Oral, Daily PRN, Carmen Cochran MD, 5 mg at 08/04/18 2139    bisacodyl (DULCOLAX) rectal suppository 10 mg, 10 mg, Rectal, Daily PRN, MD Lm Peters bromocriptine (PARLODEL) tablet 2 5 mg, 2 5 mg, Oral, After Tonya Cordero MD    citalopram (CeleXA) tablet 10 mg, 10 mg, Oral, Daily, Luis Schmid MD, 10 mg at 08/07/18 0232    cloNIDine (CATAPRES) tablet 0 1 mg, 0 1 mg, Oral, Once, Gloria Castañeda MD, Stopped at 07/27/18 2144    clopidogrel (PLAVIX) tablet 75 mg, 75 mg, Oral, Daily, Luis Schmid MD, 75 mg at 08/07/18 7520    cyanocobalamin (VITAMIN B-12) tablet 1,000 mcg, 1,000 mcg, Oral, Daily, Luis Schmid MD, 1,000 mcg at 08/07/18 3195    docusate sodium (COLACE) capsule 100 mg, 100 mg, Oral, BID, Luis Schmid MD, 100 mg at 08/07/18 0837    enoxaparin (LOVENOX) subcutaneous injection 40 mg, 40 mg, Subcutaneous, Daily, Luis Schmid MD, 40 mg at 08/07/18 6259    ferrous sulfate tablet 325 mg, 325 mg, Oral, Daily With Breakfast, Luis Schmid MD, 325 mg at 08/07/18 0837    lidocaine (LIDODERM) 5 % patch 1 patch, 1 patch, Transdermal, Daily, Luis Schmid MD, 1 patch at 08/07/18 4215    methylphenidate (RITALIN) tablet 5 mg, 5 mg, Oral, Early Morning, Luis Schmid MD, 5 mg at 08/07/18 2662    oxyCODONE (ROXICODONE) IR tablet 2 5 mg, 2 5 mg, Oral, Q4H PRN, Luis Schmid MD    [facility]) tablet 8 6 mg, 1 tablet, Oral, Daily, Luis Schmid MD, 8 6 mg at 08/07/18 6110    Physical exam:  Vitals:    08/07/18 1148   BP: 121/68   Pulse: 66     right lower extremity  · Incision CDI with no active drainage or erythema  · Sensation intact L1-S1  · Motor intact to knee flexion/extension, EHL/FHL  · 2+ dorsalis pedis pulse    Assessment: 80 y  o female post operative day 20 right femur IMN  Plan:  · Up and out of bed  · Weightbearing as tolerated  · PT/OT- WIll need extensive rehabilitation and gait training due to stroke, but patient has no restrictions in terms of ROM and WB status in RLE  · DVT prophylaxis-completed  · FU in 6 weeks for repeat x-rays   Can be done at facility  · Analgesics-oral pain medication    Yas Anand MD

## 2018-08-07 NOTE — NURSING NOTE
Pt just left the floor with [facility] for an appointment with Dr Adithya Marcano at [facility], Joseph Esparza the PCA accompanied the pt to the appointment  Sherie upon assessment in the am was noted to be slightly weaker and  Also has a slight right facial droop-- was made aware and was seen, no new orders

## 2018-08-08 LAB
ANION GAP SERPL CALCULATED.3IONS-SCNC: 8 MMOL/L (ref 5–14)
BUN SERPL-MCNC: 25 MG/DL (ref 5–25)
CALCIUM SERPL-MCNC: 11.2 MG/DL (ref 8.4–10.2)
CHLORIDE SERPL-SCNC: 104 MMOL/L (ref 97–108)
CO2 SERPL-SCNC: 27 MMOL/L (ref 22–30)
CREAT SERPL-MCNC: 0.52 MG/DL (ref 0.6–1.2)
GFR SERPL CREATININE-BSD FRML MDRD: 86 ML/MIN/1.73SQ M
GLUCOSE SERPL-MCNC: 126 MG/DL (ref 70–99)
POTASSIUM SERPL-SCNC: 4.1 MMOL/L (ref 3.6–5)
SODIUM SERPL-SCNC: 139 MMOL/L (ref 137–147)

## 2018-08-08 PROCEDURE — 80048 BASIC METABOLIC PNL TOTAL CA: CPT | Performed by: FAMILY MEDICINE

## 2018-08-08 PROCEDURE — 99232 SBSQ HOSP IP/OBS MODERATE 35: CPT | Performed by: FAMILY MEDICINE

## 2018-08-08 RX ORDER — SODIUM CHLORIDE 9 MG/ML
100 INJECTION, SOLUTION INTRAVENOUS CONTINUOUS
Status: DISCONTINUED | OUTPATIENT
Start: 2018-08-08 | End: 2018-08-10

## 2018-08-08 RX ADMIN — CITALOPRAM HYDROBROMIDE 10 MG: 20 TABLET ORAL at 08:30

## 2018-08-08 RX ADMIN — BROMOCRIPTINE MESYLATE 2.5 MG: 2.5 TABLET ORAL at 18:07

## 2018-08-08 RX ADMIN — ACETAMINOPHEN 650 MG: 325 TABLET ORAL at 16:18

## 2018-08-08 RX ADMIN — FERROUS SULFATE TAB 325 MG (65 MG ELEMENTAL FE) 325 MG: 325 (65 FE) TAB at 08:30

## 2018-08-08 RX ADMIN — SENNOSIDES 8.6 MG: 8.6 TABLET, FILM COATED ORAL at 08:30

## 2018-08-08 RX ADMIN — LIDOCAINE 1 PATCH: 50 PATCH CUTANEOUS at 08:31

## 2018-08-08 RX ADMIN — METHYLPHENIDATE HYDROCHLORIDE 5 MG: 5 TABLET ORAL at 06:11

## 2018-08-08 RX ADMIN — ATORVASTATIN CALCIUM 40 MG: 40 TABLET, FILM COATED ORAL at 18:07

## 2018-08-08 RX ADMIN — ASPIRIN 81 MG 81 MG: 81 TABLET ORAL at 08:30

## 2018-08-08 RX ADMIN — DOCUSATE SODIUM 100 MG: 100 CAPSULE, LIQUID FILLED ORAL at 18:07

## 2018-08-08 RX ADMIN — ACETAMINOPHEN 650 MG: 325 TABLET ORAL at 12:13

## 2018-08-08 RX ADMIN — ENOXAPARIN SODIUM 40 MG: 40 INJECTION SUBCUTANEOUS at 08:31

## 2018-08-08 RX ADMIN — CLOPIDOGREL 75 MG: 75 TABLET, FILM COATED ORAL at 08:30

## 2018-08-08 RX ADMIN — BISACODYL 5 MG: 5 TABLET, COATED ORAL at 06:11

## 2018-08-08 RX ADMIN — ACETAMINOPHEN 650 MG: 325 TABLET ORAL at 06:10

## 2018-08-08 RX ADMIN — Medication 1000 MCG: at 08:30

## 2018-08-08 RX ADMIN — DOCUSATE SODIUM 100 MG: 100 CAPSULE, LIQUID FILLED ORAL at 08:30

## 2018-08-08 NOTE — PROGRESS NOTES
Progress Note - Yony Hughes 9/22/1930, 80 y o  female MRN: [id number]    Unit/Bed#: [facility] 269-02 Encounter: [id number]    Primary Care Provider: Isrrael Cadena MD   Date and time admitted to hospital: 7/24/2018  2:00 PM        Hypercalcemia   Assessment & Plan    Patient found to have a serum calcium level of 11 3  Unclear etiology of hypercalcemia  Will repeat a BMP and ionized calcium level along with a vitamin D 25 hydroxy level  If if calcium level is again high will place on IV fluid hydration and try to workup etiology  Could be secondary to immobility and  poor p o  intake  Acute on chronic diastolic heart failure [facility])   Assessment & Plan    Currently the patient is euvolemic  No need for any Lasix  CVA (cerebral vascular accident) [facility])   Assessment & Plan    · Slowly improving   · Will continue aspirin and Plavix dual antiplatelet for 21 days, then will be on aspirin alone  Continue statin  · Has been evaluated Neurology  · Echo reviewed-no PFO  · Posterior circulation stenosis continue aspirin and Plavix for 21 days and statin discussed with the surgery vascular no other interventions  · Carotid less than 50% stenosis outpatient follow-up        S/P ORIF (open reduction internal fixation) fracture   Assessment & Plan    Continue PT OT  Pain is well controlled on Oxy IR and Tylenol  On lovenox for dvt prophylaxis        Hypertension   Assessment & Plan    Continue home blood pressure medications  Well controlled          Hypercholesterolemia   Assessment & Plan    Cont statin        Alzheimers disease   Assessment & Plan    Supportive care  Frequent reorientation  Avoid unnecessary sedatives benzodiazepines, pain medication   Patient was previously on Aricept but not anymore           VTE Pharmacologic Prophylaxis:   Pharmacologic: Enoxaparin (Lovenox)  Mechanical VTE Prophylaxis in Place: Yes    Patient Centered Rounds: I have performed bedside rounds with nursing staff today     Discussions with Specialists or Other Care Team Provider: none    Education and Discussions with Family / Patient: none  patient does not seem too receptive to education    Time Spent for Care: 30 minutes  More than 50% of total time spent on counseling and coordination of care as described above  Current Length of Stay: 0 day(s)    Current Patient Status: Outpatient   Certification Statement: The patient will continue to require additional inpatient hospital stay due to rehab needs    Discharge Plan: as per physiatry    Code Status: Level 3 - DNAR and DNI      Subjective:   Patient is slow to respond  Denies any chest pain or shortness of breath  Denies any abdominal pain    Objective:     Vitals:   Temp (24hrs), Av 8 °F (36 6 °C), Min:97 3 °F (36 3 °C), Max:98 6 °F (37 °C)    HR:  [66-74] 66  Resp:  [16-18] 16  BP: (135-173)/(60-75) 135/60  SpO2:  [98 %-99 %] 98 %  Body mass index is 30 09 kg/m²  Input and Output Summary (last 24 hours):     No intake or output data in the 24 hours ending 18 1421    Physical Exam:     Physical Exam   Constitutional: She appears well-developed and well-nourished  HENT:   Head: Normocephalic and atraumatic  Mouth/Throat: Oropharynx is clear and moist    Eyes: Conjunctivae and EOM are normal  Pupils are equal, round, and reactive to light  Neck: Normal range of motion  Neck supple  Cardiovascular: Normal rate, regular rhythm, normal heart sounds and intact distal pulses  Pulmonary/Chest: Effort normal and breath sounds normal    Abdominal: Soft  Bowel sounds are normal  She exhibits no mass  There is no tenderness  There is no rebound and no guarding  Genitourinary:   Genitourinary Comments: deferred   Musculoskeletal: She exhibits edema  Neurological: She is alert  She has normal reflexes  Skin: Skin is warm and dry  No rash noted  Nursing note and vitals reviewed          Additional Data:     Labs:      Results from last 7 days  Lab Units 08/07/18  0729   WBC Thousand/uL 8 60   HEMOGLOBIN g/dL 10 7*   HEMATOCRIT % 31 9*   PLATELETS Thousands/uL 194   NEUTROS PCT % 65   LYMPHS PCT % 25   MONOS PCT % 7   EOS PCT % 3       Results from last 7 days  Lab Units 08/07/18  1400   SODIUM mmol/L 141   POTASSIUM mmol/L 4 0   CHLORIDE mmol/L 105   CO2 mmol/L 29   BUN mg/dL 17   CREATININE mg/dL 0 45*   CALCIUM mg/dL 11 3*   TOTAL PROTEIN g/dL 6 8   BILIRUBIN TOTAL mg/dL 0 30   ALK PHOS U/L 153*   ALT U/L 42   AST U/L 39*   GLUCOSE RANDOM mg/dL 108*           Results from last 7 days  Lab Units 08/03/18  1642 08/03/18  1203 08/03/18  0625 08/02/18  1602 08/02/18  1157 08/02/18  0604 08/01/18  1611   POC GLUCOSE mg/dl 136* 168* 129* 184* 164* 155* 135*             * I Have Reviewed All Lab Data Listed Above  * Additional Pertinent Lab Tests Reviewed:  Eddi 66 Admission Reviewed    Imaging:    Imaging Reports Reviewed Today Include: none  Imaging Personally Reviewed by Myself Includes:  none    Recent Cultures (last 7 days):           Last 24 Hours Medication List:     Current Facility-Administered Medications:  acetaminophen 650 mg Oral TID AC Lambert Steele MD   amLODIPine 10 mg Oral Daily Lambert Steele MD   aspirin 81 mg Oral Daily Lambert Steele MD   atorvastatin 40 mg Oral QPM Lambert Steele MD   bisacodyl 5 mg Oral Daily PRN Lambert Steele MD   bisacodyl 10 mg Rectal Daily PRN Lambert Steele MD   bromocriptine 2 5 mg Oral After Christopher Cazares MD   citalopram 10 mg Oral Daily Lambert Steele MD   clopidogrel 75 mg Oral Daily Lambert Steele MD   cyanocobalamin 1,000 mcg Oral Daily Lambert Steele MD   docusate sodium 100 mg Oral BID Lambert Steele MD   enoxaparin 40 mg Subcutaneous Daily Lambert Steele MD   ferrous sulfate 325 mg Oral Daily With Breakfast Lambert Steele MD   lidocaine 1 patch Transdermal Daily Lambert Steele MD   methylphenidate 5 mg Oral Early Morning Lambert Steele MD   oxyCODONE 2 5 mg Oral Q4H PRN Lambert Steele MD   senna 1 tablet Oral Daily Lambert Steele MD        Today, Patient Was Seen By: Ru Russell MD    ** Please Note: Dictation voice to text software may have been used in the creation of this document   **

## 2018-08-08 NOTE — ASSESSMENT & PLAN NOTE
Patient found to have a serum calcium level of 11 3  Unclear etiology of hypercalcemia  Will repeat a BMP and ionized calcium level along with a vitamin D 25 hydroxy level  If if calcium level is again high will place on IV fluid hydration and try to workup etiology  Could be secondary to immobility and  poor p o  intake

## 2018-08-09 ENCOUNTER — APPOINTMENT (OUTPATIENT)
Dept: INTERVENTIONAL RADIOLOGY/VASCULAR | Facility: HOSPITAL | Age: 83
End: 2018-08-09
Attending: PHYSICAL MEDICINE & REHABILITATION
Payer: MEDICARE

## 2018-08-09 LAB
25(OH)D3 SERPL-MCNC: 44.4 NG/ML (ref 30–100)
ANION GAP SERPL CALCULATED.3IONS-SCNC: 9 MMOL/L (ref 5–14)
BUN SERPL-MCNC: 24 MG/DL (ref 5–25)
CALCIUM SERPL-MCNC: 10.9 MG/DL (ref 8.4–10.2)
CHLORIDE SERPL-SCNC: 104 MMOL/L (ref 97–108)
CO2 SERPL-SCNC: 25 MMOL/L (ref 22–30)
CREAT SERPL-MCNC: 0.47 MG/DL (ref 0.6–1.2)
GFR SERPL CREATININE-BSD FRML MDRD: 89 ML/MIN/1.73SQ M
GLUCOSE SERPL-MCNC: 177 MG/DL (ref 70–99)
POTASSIUM SERPL-SCNC: 3.9 MMOL/L (ref 3.6–5)
SODIUM SERPL-SCNC: 138 MMOL/L (ref 137–147)

## 2018-08-09 PROCEDURE — 80048 BASIC METABOLIC PNL TOTAL CA: CPT | Performed by: FAMILY MEDICINE

## 2018-08-09 PROCEDURE — C1751 CATH, INF, PER/CENT/MIDLINE: HCPCS

## 2018-08-09 RX ADMIN — CLOPIDOGREL 75 MG: 75 TABLET, FILM COATED ORAL at 08:14

## 2018-08-09 RX ADMIN — CITALOPRAM HYDROBROMIDE 10 MG: 20 TABLET ORAL at 08:14

## 2018-08-09 RX ADMIN — FERROUS SULFATE TAB 325 MG (65 MG ELEMENTAL FE) 325 MG: 325 (65 FE) TAB at 08:14

## 2018-08-09 RX ADMIN — ACETAMINOPHEN 650 MG: 325 TABLET ORAL at 16:27

## 2018-08-09 RX ADMIN — ENOXAPARIN SODIUM 40 MG: 40 INJECTION SUBCUTANEOUS at 08:14

## 2018-08-09 RX ADMIN — Medication 1000 MCG: at 08:13

## 2018-08-09 RX ADMIN — BROMOCRIPTINE MESYLATE 2.5 MG: 2.5 TABLET ORAL at 17:29

## 2018-08-09 RX ADMIN — METHYLPHENIDATE HYDROCHLORIDE 5 MG: 5 TABLET ORAL at 06:33

## 2018-08-09 RX ADMIN — SODIUM CHLORIDE 100 ML/HR: 9 INJECTION, SOLUTION INTRAVENOUS at 12:00

## 2018-08-09 RX ADMIN — ACETAMINOPHEN 650 MG: 325 TABLET ORAL at 06:26

## 2018-08-09 RX ADMIN — ACETAMINOPHEN 650 MG: 325 TABLET ORAL at 12:07

## 2018-08-09 RX ADMIN — BISACODYL 5 MG: 5 TABLET, COATED ORAL at 06:35

## 2018-08-09 RX ADMIN — SENNOSIDES 8.6 MG: 8.6 TABLET, FILM COATED ORAL at 08:14

## 2018-08-09 RX ADMIN — ATORVASTATIN CALCIUM 40 MG: 40 TABLET, FILM COATED ORAL at 17:29

## 2018-08-09 RX ADMIN — ASPIRIN 81 MG 81 MG: 81 TABLET ORAL at 08:14

## 2018-08-09 RX ADMIN — LIDOCAINE 1 PATCH: 50 PATCH CUTANEOUS at 08:14

## 2018-08-09 RX ADMIN — DOCUSATE SODIUM 100 MG: 100 CAPSULE, LIQUID FILLED ORAL at 17:29

## 2018-08-09 RX ADMIN — DOCUSATE SODIUM 100 MG: 100 CAPSULE, LIQUID FILLED ORAL at 08:14

## 2018-08-09 NOTE — PROCEDURES
PICC Line Insertion  Date/Time: 8/9/2018 12:02 PM  Performed by: Nevaeh James  Authorized by: Catrachita BRAN     Patient location:  Bedside  Consent:     Consent obtained:  Written    Consent given by: daughter  Risks discussed:  Arterial puncture, incorrect placement, nerve damage, infection and bleeding    Alternatives discussed:  No treatment and alternative treatment  Universal protocol:     Procedure explained and questions answered to patient or proxy's satisfaction: yes      Relevant documents present and verified: yes      Test results available and properly labeled: yes      Radiology Images displayed and confirmed  If images not available, report reviewed: yes      Required blood products, implants, devices, and special equipment available: yes      Site/side marked: yes      Immediately prior to procedure, a time out was called: yes      Patient identity confirmed:  Arm band  Pre-procedure details:     Hand hygiene: Hand hygiene performed prior to insertion      Sterile barrier technique: All elements of maximal sterile technique followed      Skin preparation:  ChloraPrep    Skin preparation agent: Skin preparation agent completely dried prior to procedure    Indications:     PICC line indications: no peripheral vascular access    Anesthesia (see MAR for exact dosages): Anesthesia method:  Local infiltration    Local anesthetic:  Lidocaine 1% w/o epi  Procedure details:     Location:  Brachial    Vessel type: vein      Laterality:  Left    Site selection rationale:  Unable to use right arm due to stroke    Approach: percutaneous technique used      Patient position:  Flat    Procedural supplies:  Single lumen    Catheter size:  5 Fr    Landmarks identified: yes      Ultrasound guidance: yes      Sterile ultrasound techniques: Sterile gel and sterile probe covers were used      Number of attempts:  1    Successful placement: yes      Cath access vessel: Midline placed      Total catheter length (cm):  20    Catheter out on skin (cm):  3    Max flow rate:  6ml/sec    Arm circumference:  32  Post-procedure details:     Post-procedure:  Dressing applied and securement device placed    Assessment:  Blood return through all ports and free fluid flow    Post-procedure complications: none      Patient tolerance of procedure:   Tolerated well, no immediate complications

## 2018-08-09 NOTE — NURSING NOTE
3-11 RN tried x 3 to start IV but infiltrated within 5 min  I tried x 1 but also infiltrated within 5 min  Pt resting comfortably now with C Pap on

## 2018-08-09 NOTE — NURSING NOTE
Called  again and said Dr Ysabel Maloney is not on  Talked to Dr Thalia Duque but she has no priviledge here at [facility]

## 2018-08-09 NOTE — NURSING NOTE
PT had midline placed in LUE, flushes well  BMP drawn and sent to lab, will draw Vit D later today as blood flow was sluggish and IV NSS started at 1200 today  Some blood noted on bandage under transparent dressing, dressing is intact

## 2018-08-09 NOTE — PLAN OF CARE
Problem: Potential for Falls  Goal: Patient will remain free of falls  INTERVENTIONS:  - Assess patient frequently for physical needs  -  Identify cognitive and physical deficits and behaviors that affect risk of falls    -  [place] fall precautions as indicated by assessment   - Educate patient/family on patient safety including physical limitations  - Instruct patient to call for assistance with activity based on assessment  - Modify environment to reduce risk of injury  - Consider OT/PT consult to assist with strengthening/mobility   Outcome: Progressing      Problem: Prexisting or High Potential for Compromised Skin Integrity  Goal: Skin integrity is maintained or improved  INTERVENTIONS:  - Identify patients at risk for skin breakdown  - Assess and monitor skin integrity  - Assess and monitor nutrition and hydration status  - Monitor labs (i e  albumin)  - Assess for incontinence   - Turn and reposition patient  - Assist with mobility/ambulation  - Relieve pressure over bony prominences  - Avoid friction and shearing  - Provide appropriate hygiene as needed including keeping skin clean and dry  - Evaluate need for skin moisturizer/barrier cream  - Collaborate with interdisciplinary team (i e  Nutrition, Rehabilitation, etc )   - Patient/family teaching   Outcome: Progressing      Problem: PAIN - ADULT  Goal: Verbalizes/displays adequate comfort level or baseline comfort level  Interventions:  - Encourage patient to monitor pain and request assistance  - Assess pain using appropriate pain scale  - Administer analgesics based on type and severity of pain and evaluate response  - Implement non-pharmacological measures as appropriate and evaluate response  - Consider cultural and social influences on pain and pain management  - Notify physician/advanced practitioner if interventions unsuccessful or patient reports new pain   Outcome: Progressing      Problem: SAFETY ADULT  Goal: Maintain or return to baseline ADL function  INTERVENTIONS:  -  Assess patient's ability to carry out ADLs; assess patient's baseline for ADL function and identify physical deficits which impact ability to perform ADLs (bathing, care of mouth/teeth, toileting, grooming, dressing, etc )  - Assess/evaluate cause of self-care deficits   - Assess range of motion  - Assess patient's mobility; develop plan if impaired  - Assess patient's need for assistive devices and provide as appropriate  - Encourage maximum independence but intervene and supervise when necessary  ¯ Involve family in performance of ADLs  ¯ Assess for home care needs following discharge   ¯ Request OT consult to assist with ADL evaluation and planning for discharge  ¯ Provide patient education as appropriate   Outcome: Progressing    Goal: Maintain or return mobility status to optimal level  INTERVENTIONS:  - Assess patient's baseline mobility status (ambulation, transfers, stairs, etc )    - Identify cognitive and physical deficits and behaviors that affect mobility  - Identify mobility aids required to assist with transfers and/or ambulation (gait belt, sit-to-stand, lift, walker, cane, etc )  - [place] fall precautions as indicated by assessment  - Record patient progress and toleration of activity level on Mobility SBAR; progress patient to next Phase/Stage  - Instruct patient to call for assistance with activity based on assessment  -    Outcome: Progressing      Problem: DISCHARGE PLANNING  Goal: Discharge to home or other facility with appropriate resources  INTERVENTIONS:  - Identify barriers to discharge w/patient and caregiver  - Arrange for needed discharge resources and transportation as appropriate  - Identify discharge learning needs (meds, wound care, etc )  - Arrange for interpretive services to assist at discharge as needed  - Refer to Case Management Department for coordinating discharge planning if the patient needs post-hospital services based on physician/advanced practitioner order or complex needs related to functional status, cognitive ability, or social support system   Outcome: Progressing      Problem: Neurological Deficit  Goal: Neurological status is stable or improving  Interventions:  - Monitor and assess patient's level of consciousness, motor function, sensory function, and level of assistance needed for ADLs  - Monitor and report changes from baseline  Collaborate with interdisciplinary team to initiate plan and implement interventions as ordered  - Provide and maintain a safe environment  - Utilize seizure precautions  - Utilize fall precautions  - Utilize aspiration precautions  - Utilize bleeding precautions  Outcome: Progressing      Problem: Activity Intolerance/Impaired Mobility  Goal: Mobility/activity is maintained at optimum level for patient  Interventions:  - Assess and monitor patient  barriers to mobility and need for assistive/adaptive devices  - Assess patient's emotional response to limitations  - Collaborate with interdisciplinary team and initiate plans and interventions as ordered  - Encourage independent activity per ability   - Maintain proper body alignment  - Perform active/passive rom as tolerated/ordered  - Plan activities to conserve energy   - Turn patient   Outcome: Progressing      Problem: Communication Impairment  Goal: Ability to express needs and understand communication  Assess patient's communication skills and ability to understand information  Patient will demonstrate use of effective communication techniques, alternative methods of communication and understanding even if not able to speak  - Encourage communication and provide alternate methods of communication as needed  - Collaborate with case management/ for discharge needs  - Include patient/family/caregiver in decisions related to communication     Outcome: Progressing      Problem: Potential for Aspiration  Goal: Non-ventilated patient's risk of aspiration is minimized  Assess and monitor vital signs, respiratory status, and labs (WBC)  Monitor for signs of aspiration (tachypnea, cough, rales, wheezing, cyanosis, fever)  - Assess and monitor patient's ability to swallow  - Place patient up in chair to eat if possible  - HOB up at 90 degrees to eat if unable to get patient up into chair   - Supervise patient during oral intake  - Instruct patient to take small bites  - Instruct patient to take small single sips when taking liquids  - Follow patient-specific strategies generated by speech pathologist    Outcome: Progressing      Problem: Nutrition  Goal: Nutrition/Hydration status is improving  Monitor and assess patient's nutrition/hydration status for malnutrition (ex- brittle hair, bruises, dry skin, pale skin and conjunctiva, muscle wasting, smooth red tongue, and disorientation)  Collaborate with interdisciplinary team and initiate plan and interventions as ordered  Monitor patient's weight and dietary intake as ordered or per policy  Utilize nutrition screening tool and intervene per policy  Determine patient's food preferences and provide high-protein, high-caloric foods as appropriate  - Assist patient with eating   - Allow adequate time for meals   - Encourage patient to take dietary supplement as ordered  - Collaborate with clinical nutritionist   - Include patient/family/caregiver in decisions related to nutrition     Outcome: Progressing

## 2018-08-10 RX ADMIN — ASPIRIN 81 MG 81 MG: 81 TABLET ORAL at 09:01

## 2018-08-10 RX ADMIN — SENNOSIDES 8.6 MG: 8.6 TABLET, FILM COATED ORAL at 09:01

## 2018-08-10 RX ADMIN — DOCUSATE SODIUM 100 MG: 100 CAPSULE, LIQUID FILLED ORAL at 17:23

## 2018-08-10 RX ADMIN — ATORVASTATIN CALCIUM 40 MG: 40 TABLET, FILM COATED ORAL at 17:23

## 2018-08-10 RX ADMIN — SODIUM CHLORIDE 100 ML/HR: 9 INJECTION, SOLUTION INTRAVENOUS at 12:07

## 2018-08-10 RX ADMIN — ACETAMINOPHEN 650 MG: 325 TABLET ORAL at 06:24

## 2018-08-10 RX ADMIN — AMLODIPINE BESYLATE 10 MG: 5 TABLET ORAL at 09:09

## 2018-08-10 RX ADMIN — METHYLPHENIDATE HYDROCHLORIDE 5 MG: 5 TABLET ORAL at 06:24

## 2018-08-10 RX ADMIN — CLOPIDOGREL 75 MG: 75 TABLET, FILM COATED ORAL at 09:01

## 2018-08-10 RX ADMIN — ACETAMINOPHEN 650 MG: 325 TABLET ORAL at 12:07

## 2018-08-10 RX ADMIN — ENOXAPARIN SODIUM 40 MG: 40 INJECTION SUBCUTANEOUS at 09:02

## 2018-08-10 RX ADMIN — FERROUS SULFATE TAB 325 MG (65 MG ELEMENTAL FE) 325 MG: 325 (65 FE) TAB at 09:01

## 2018-08-10 RX ADMIN — LIDOCAINE 1 PATCH: 50 PATCH CUTANEOUS at 09:01

## 2018-08-10 RX ADMIN — BISACODYL 10 MG: 10 SUPPOSITORY RECTAL at 03:00

## 2018-08-10 RX ADMIN — CITALOPRAM HYDROBROMIDE 10 MG: 20 TABLET ORAL at 09:01

## 2018-08-10 RX ADMIN — ACETAMINOPHEN 650 MG: 325 TABLET ORAL at 16:38

## 2018-08-10 RX ADMIN — Medication 1000 MCG: at 09:01

## 2018-08-10 RX ADMIN — BROMOCRIPTINE MESYLATE 2.5 MG: 2.5 TABLET ORAL at 17:23

## 2018-08-10 RX ADMIN — DOCUSATE SODIUM 100 MG: 100 CAPSULE, LIQUID FILLED ORAL at 09:01

## 2018-08-10 NOTE — PLAN OF CARE
Problem: Potential for Falls  Goal: Patient will remain free of falls  INTERVENTIONS:  - Assess patient frequently for physical needs  -  Identify cognitive and physical deficits and behaviors that affect risk of falls    -  [place] fall precautions as indicated by assessment   - Educate patient/family on patient safety including physical limitations  - Instruct patient to call for assistance with activity based on assessment  - Modify environment to reduce risk of injury  - Consider OT/PT consult to assist with strengthening/mobility   Outcome: Progressing      Problem: Prexisting or High Potential for Compromised Skin Integrity  Goal: Skin integrity is maintained or improved  INTERVENTIONS:  - Identify patients at risk for skin breakdown  - Assess and monitor skin integrity  - Assess and monitor nutrition and hydration status  - Monitor labs (i e  albumin)  - Assess for incontinence   - Turn and reposition patient  - Assist with mobility/ambulation  - Relieve pressure over bony prominences  - Avoid friction and shearing  - Provide appropriate hygiene as needed including keeping skin clean and dry  - Evaluate need for skin moisturizer/barrier cream  - Collaborate with interdisciplinary team (i e  Nutrition, Rehabilitation, etc )   - Patient/family teaching   Outcome: Progressing      Problem: PAIN - ADULT  Goal: Verbalizes/displays adequate comfort level or baseline comfort level  Interventions:  - Encourage patient to monitor pain and request assistance  - Assess pain using appropriate pain scale  - Administer analgesics based on type and severity of pain and evaluate response  - Implement non-pharmacological measures as appropriate and evaluate response  - Consider cultural and social influences on pain and pain management  - Notify physician/advanced practitioner if interventions unsuccessful or patient reports new pain   Outcome: Progressing      Problem: SAFETY ADULT  Goal: Maintain or return to baseline ADL function  INTERVENTIONS:  -  Assess patient's ability to carry out ADLs; assess patient's baseline for ADL function and identify physical deficits which impact ability to perform ADLs (bathing, care of mouth/teeth, toileting, grooming, dressing, etc )  - Assess/evaluate cause of self-care deficits   - Assess range of motion  - Assess patient's mobility; develop plan if impaired  - Assess patient's need for assistive devices and provide as appropriate  - Encourage maximum independence but intervene and supervise when necessary  ¯ Involve family in performance of ADLs  ¯ Assess for home care needs following discharge   ¯ Request OT consult to assist with ADL evaluation and planning for discharge  ¯ Provide patient education as appropriate   Outcome: Progressing    Goal: Maintain or return mobility status to optimal level  INTERVENTIONS:  - Assess patient's baseline mobility status (ambulation, transfers, stairs, etc )    - Identify cognitive and physical deficits and behaviors that affect mobility  - Identify mobility aids required to assist with transfers and/or ambulation (gait belt, sit-to-stand, lift, walker, cane, etc )  - [place] fall precautions as indicated by assessment  - Record patient progress and toleration of activity level on Mobility SBAR; progress patient to next Phase/Stage  - Instruct patient to call for assistance with activity based on assessment  -    Outcome: Progressing      Problem: DISCHARGE PLANNING  Goal: Discharge to home or other facility with appropriate resources  INTERVENTIONS:  - Identify barriers to discharge w/patient and caregiver  - Arrange for needed discharge resources and transportation as appropriate  - Identify discharge learning needs (meds, wound care, etc )  - Arrange for interpretive services to assist at discharge as needed  - Refer to Case Management Department for coordinating discharge planning if the patient needs post-hospital services based on physician/advanced practitioner order or complex needs related to functional status, cognitive ability, or social support system   Outcome: Progressing      Problem: Neurological Deficit  Goal: Neurological status is stable or improving  Interventions:  - Monitor and assess patient's level of consciousness, motor function, sensory function, and level of assistance needed for ADLs  - Monitor and report changes from baseline  Collaborate with interdisciplinary team to initiate plan and implement interventions as ordered  - Provide and maintain a safe environment  - Utilize seizure precautions  - Utilize fall precautions  - Utilize aspiration precautions  - Utilize bleeding precautions  Outcome: Progressing      Problem: Activity Intolerance/Impaired Mobility  Goal: Mobility/activity is maintained at optimum level for patient  Interventions:  - Assess and monitor patient  barriers to mobility and need for assistive/adaptive devices  - Assess patient's emotional response to limitations  - Collaborate with interdisciplinary team and initiate plans and interventions as ordered  - Encourage independent activity per ability   - Maintain proper body alignment  - Perform active/passive rom as tolerated/ordered  - Plan activities to conserve energy   - Turn patient   Outcome: Progressing      Problem: Communication Impairment  Goal: Ability to express needs and understand communication  Assess patient's communication skills and ability to understand information  Patient will demonstrate use of effective communication techniques, alternative methods of communication and understanding even if not able to speak  - Encourage communication and provide alternate methods of communication as needed  - Collaborate with case management/ for discharge needs  - Include patient/family/caregiver in decisions related to communication     Outcome: Progressing      Problem: Potential for Aspiration  Goal: Non-ventilated patient's risk of aspiration is minimized  Assess and monitor vital signs, respiratory status, and labs (WBC)  Monitor for signs of aspiration (tachypnea, cough, rales, wheezing, cyanosis, fever)  - Assess and monitor patient's ability to swallow  - Place patient up in chair to eat if possible  - HOB up at 90 degrees to eat if unable to get patient up into chair   - Supervise patient during oral intake  - Instruct patient to take small bites  - Instruct patient to take small single sips when taking liquids  - Follow patient-specific strategies generated by speech pathologist    Outcome: Adequate for Discharge      Problem: Nutrition  Goal: Nutrition/Hydration status is improving  Monitor and assess patient's nutrition/hydration status for malnutrition (ex- brittle hair, bruises, dry skin, pale skin and conjunctiva, muscle wasting, smooth red tongue, and disorientation)  Collaborate with interdisciplinary team and initiate plan and interventions as ordered  Monitor patient's weight and dietary intake as ordered or per policy  Utilize nutrition screening tool and intervene per policy  Determine patient's food preferences and provide high-protein, high-caloric foods as appropriate  - Assist patient with eating   - Allow adequate time for meals   - Encourage patient to take dietary supplement as ordered  - Collaborate with clinical nutritionist   - Include patient/family/caregiver in decisions related to nutrition     Outcome: Progressing

## 2018-08-10 NOTE — NURSING NOTE
Received patient in bed with IVF of 0 9 NaCL 1000 ml running for 100 cc/hr continous via L brachial vein midline F#5  Noted gauze on the midline site soaked with fresh red blood  Dressing changed and intact  Will continue to monitor

## 2018-08-11 RX ADMIN — ATORVASTATIN CALCIUM 40 MG: 40 TABLET, FILM COATED ORAL at 17:32

## 2018-08-11 RX ADMIN — METHYLPHENIDATE HYDROCHLORIDE 5 MG: 5 TABLET ORAL at 06:18

## 2018-08-11 RX ADMIN — ACETAMINOPHEN 650 MG: 325 TABLET ORAL at 06:18

## 2018-08-11 RX ADMIN — LIDOCAINE 1 PATCH: 50 PATCH CUTANEOUS at 08:17

## 2018-08-11 RX ADMIN — SENNOSIDES 8.6 MG: 8.6 TABLET, FILM COATED ORAL at 08:18

## 2018-08-11 RX ADMIN — BROMOCRIPTINE MESYLATE 2.5 MG: 2.5 TABLET ORAL at 17:32

## 2018-08-11 RX ADMIN — ENOXAPARIN SODIUM 40 MG: 40 INJECTION SUBCUTANEOUS at 08:17

## 2018-08-11 RX ADMIN — AMLODIPINE BESYLATE 10 MG: 5 TABLET ORAL at 08:18

## 2018-08-11 RX ADMIN — ACETAMINOPHEN 650 MG: 325 TABLET ORAL at 17:32

## 2018-08-11 RX ADMIN — Medication 1000 MCG: at 08:17

## 2018-08-11 RX ADMIN — DOCUSATE SODIUM 100 MG: 100 CAPSULE, LIQUID FILLED ORAL at 08:18

## 2018-08-11 RX ADMIN — DOCUSATE SODIUM 100 MG: 100 CAPSULE, LIQUID FILLED ORAL at 17:32

## 2018-08-11 RX ADMIN — ASPIRIN 81 MG 81 MG: 81 TABLET ORAL at 08:18

## 2018-08-11 RX ADMIN — ACETAMINOPHEN 650 MG: 325 TABLET ORAL at 12:34

## 2018-08-11 RX ADMIN — FERROUS SULFATE TAB 325 MG (65 MG ELEMENTAL FE) 325 MG: 325 (65 FE) TAB at 08:18

## 2018-08-11 RX ADMIN — CLOPIDOGREL 75 MG: 75 TABLET, FILM COATED ORAL at 08:18

## 2018-08-11 RX ADMIN — CITALOPRAM HYDROBROMIDE 10 MG: 20 TABLET ORAL at 08:17

## 2018-08-11 NOTE — NURSING NOTE
Patient's L arm midline gauze dressing soaked with bright red blood moderate in amount, dressing changed and intact  Line flushes well

## 2018-08-11 NOTE — PLAN OF CARE
Problem: Potential for Falls  Goal: Patient will remain free of falls  INTERVENTIONS:  - Assess patient frequently for physical needs  -  Identify cognitive and physical deficits and behaviors that affect risk of falls    -  [place] fall precautions as indicated by assessment   - Educate patient/family on patient safety including physical limitations  - Instruct patient to call for assistance with activity based on assessment  - Modify environment to reduce risk of injury  - Consider OT/PT consult to assist with strengthening/mobility   Outcome: Progressing      Problem: Prexisting or High Potential for Compromised Skin Integrity  Goal: Skin integrity is maintained or improved  INTERVENTIONS:  - Identify patients at risk for skin breakdown  - Assess and monitor skin integrity  - Assess and monitor nutrition and hydration status  - Monitor labs (i e  albumin)  - Assess for incontinence   - Turn and reposition patient  - Assist with mobility/ambulation  - Relieve pressure over bony prominences  - Avoid friction and shearing  - Provide appropriate hygiene as needed including keeping skin clean and dry  - Evaluate need for skin moisturizer/barrier cream  - Collaborate with interdisciplinary team (i e  Nutrition, Rehabilitation, etc )   - Patient/family teaching   Outcome: Progressing      Problem: PAIN - ADULT  Goal: Verbalizes/displays adequate comfort level or baseline comfort level  Interventions:  - Encourage patient to monitor pain and request assistance  - Assess pain using appropriate pain scale  - Administer analgesics based on type and severity of pain and evaluate response  - Implement non-pharmacological measures as appropriate and evaluate response  - Consider cultural and social influences on pain and pain management  - Notify physician/advanced practitioner if interventions unsuccessful or patient reports new pain   Outcome: Progressing      Problem: SAFETY ADULT  Goal: Maintain or return to baseline ADL function  INTERVENTIONS:  -  Assess patient's ability to carry out ADLs; assess patient's baseline for ADL function and identify physical deficits which impact ability to perform ADLs (bathing, care of mouth/teeth, toileting, grooming, dressing, etc )  - Assess/evaluate cause of self-care deficits   - Assess range of motion  - Assess patient's mobility; develop plan if impaired  - Assess patient's need for assistive devices and provide as appropriate  - Encourage maximum independence but intervene and supervise when necessary  ¯ Involve family in performance of ADLs  ¯ Assess for home care needs following discharge   ¯ Request OT consult to assist with ADL evaluation and planning for discharge  ¯ Provide patient education as appropriate   Outcome: Progressing    Goal: Maintain or return mobility status to optimal level  INTERVENTIONS:  - Assess patient's baseline mobility status (ambulation, transfers, stairs, etc )    - Identify cognitive and physical deficits and behaviors that affect mobility  - Identify mobility aids required to assist with transfers and/or ambulation (gait belt, sit-to-stand, lift, walker, cane, etc )  - [place] fall precautions as indicated by assessment  - Record patient progress and toleration of activity level on Mobility SBAR; progress patient to next Phase/Stage  - Instruct patient to call for assistance with activity based on assessment  -    Outcome: Progressing      Problem: DISCHARGE PLANNING  Goal: Discharge to home or other facility with appropriate resources  INTERVENTIONS:  - Identify barriers to discharge w/patient and caregiver  - Arrange for needed discharge resources and transportation as appropriate  - Identify discharge learning needs (meds, wound care, etc )  - Arrange for interpretive services to assist at discharge as needed  - Refer to Case Management Department for coordinating discharge planning if the patient needs post-hospital services based on physician/advanced practitioner order or complex needs related to functional status, cognitive ability, or social support system   Outcome: Progressing      Problem: Neurological Deficit  Goal: Neurological status is stable or improving  Interventions:  - Monitor and assess patient's level of consciousness, motor function, sensory function, and level of assistance needed for ADLs  - Monitor and report changes from baseline  Collaborate with interdisciplinary team to initiate plan and implement interventions as ordered  - Provide and maintain a safe environment  - Utilize seizure precautions  - Utilize fall precautions  - Utilize aspiration precautions  - Utilize bleeding precautions  Outcome: Progressing      Problem: Activity Intolerance/Impaired Mobility  Goal: Mobility/activity is maintained at optimum level for patient  Interventions:  - Assess and monitor patient  barriers to mobility and need for assistive/adaptive devices  - Assess patient's emotional response to limitations  - Collaborate with interdisciplinary team and initiate plans and interventions as ordered  - Encourage independent activity per ability   - Maintain proper body alignment  - Perform active/passive rom as tolerated/ordered  - Plan activities to conserve energy   - Turn patient   Outcome: Progressing      Problem: Communication Impairment  Goal: Ability to express needs and understand communication  Assess patient's communication skills and ability to understand information  Patient will demonstrate use of effective communication techniques, alternative methods of communication and understanding even if not able to speak  - Encourage communication and provide alternate methods of communication as needed  - Collaborate with case management/ for discharge needs  - Include patient/family/caregiver in decisions related to communication     Outcome: Progressing      Problem: Potential for Aspiration  Goal: Non-ventilated patient's risk of aspiration is minimized  Assess and monitor vital signs, respiratory status, and labs (WBC)  Monitor for signs of aspiration (tachypnea, cough, rales, wheezing, cyanosis, fever)  - Assess and monitor patient's ability to swallow  - Place patient up in chair to eat if possible  - HOB up at 90 degrees to eat if unable to get patient up into chair   - Supervise patient during oral intake  - Instruct patient to take small bites  - Instruct patient to take small single sips when taking liquids  - Follow patient-specific strategies generated by speech pathologist    Outcome: Progressing      Problem: Nutrition  Goal: Nutrition/Hydration status is improving  Monitor and assess patient's nutrition/hydration status for malnutrition (ex- brittle hair, bruises, dry skin, pale skin and conjunctiva, muscle wasting, smooth red tongue, and disorientation)  Collaborate with interdisciplinary team and initiate plan and interventions as ordered  Monitor patient's weight and dietary intake as ordered or per policy  Utilize nutrition screening tool and intervene per policy  Determine patient's food preferences and provide high-protein, high-caloric foods as appropriate  - Assist patient with eating   - Allow adequate time for meals   - Encourage patient to take dietary supplement as ordered  - Collaborate with clinical nutritionist   - Include patient/family/caregiver in decisions related to nutrition     Outcome: Progressing

## 2018-08-11 NOTE — PROGRESS NOTES
Progress Note - Nik Strickland 80 y o  female MRN: [id number]    Unit/Bed#: L[facility] 269-02 Encounter: [id number]      Assessment:  Abnormal gait (Chronic)   Abnormal weight gain   Abnormal weight loss   Acromegalia (HCC)   Alopecia   Alzheimers disease (Chronic)   Arthritis   Bilateral impacted cerumen   Depression   Fatigue   Hypercholesterolemia (Chronic)   Hypertension   Osteopenia   Sleep disorder   Type 2 diabetes mellitus, with long-term current use of insulin (HCC) (Chronic)   Vitamin D deficiency (Chronic)   Candidal UTI (urinary tract infection)   RAZIA (obstructive sleep apnea)   Accelerated hypertension   S/P ORIF (open reduction internal fixation) fracture   Hypercalcemia (Chronic)   Anemia   Acute deep vein thrombosis (DVT) of right lower extremity (HCC)   Constipation   CVA (cerebral vascular accident) ([facility] [address] )   [facility] chronic diastolic [facility]         Plan:  Continue comprehensive inpatient multidisciplinary rehabilitation program  CV protection with ASA/Plavix  DVT prevention with lovenox  Continue Celexa for mood    Medications      Report   Scheduled     Medication Dose/Rate, Route, Frequency   acetaminophen (TYLENOL) tablet 650 mg 650 mg, PO, TID AC   amLODIPine (NORVASC) tablet 10 mg 10 mg, PO, Daily   aspirin chewable tablet 81 mg 81 mg, PO, Daily   atorvastatin (LIPITOR) tablet 40 mg 40 mg, PO, QPM   bromocriptine (PARLODEL) tablet 2 5 mg 2 5 mg, PO, After Dinner   citalopram (CeleXA) tablet 10 mg 10 mg, PO, Daily   clopidogrel (PLAVIX) tablet 75 mg 75 mg, PO, Daily   cyanocobalamin (VITAMIN B-12) tablet 1,000 mcg 1,000 mcg, PO, Daily   docusate sodium (COLACE) capsule 100 mg 100 mg, PO, BID   enoxaparin (LOVENOX) subcutaneous injection 40 mg 40 mg, SC, Daily   ferrous sulfate tablet 325 mg 325 mg, PO, Daily With Breakfast   lidocaine (LIDODERM) 5 % patch 1 patch 1 patch, TD, Daily   methylphenidate (RITALIN) tablet 5 mg 5 mg, PO, Early Morning   senna (SENOKOT) tablet 8 6 mg 8 6 mg, PO, Daily      PRN     Medication Dose/Rate, Route, Frequency   bisacodyl (DULCOLAX) EC tablet 5 mg 5 mg, PO, Daily PRN   bisacodyl (DULCOLAX) rectal suppository 10 mg 10 mg, RE, Daily PRN   oxyCODONE (ROXICODONE) IR tablet 2 5 mg           Subjective:   Denies complaints this AM    Objective:     Vitals: Blood pressure 145/63, pulse 67, temperature 97 9 °F (36 6 °C), temperature source Temporal, resp  rate 18, height 4' 11" (1 499 m), weight 68 6 kg (151 lb 5 oz), SpO2 99 %, not currently breastfeeding  ,Body mass index is 30 56 kg/m²        Intake/Output Summary (Last 24 hours) at 08/11/18 1024  Last data filed at 08/10/18 2201   Gross per 24 hour   Intake               10 ml   Output                0 ml   Net               10 ml       Physical Exam:   Normal level of arousal  Adult female lying in hospital bed  In no distress  Responds to commands  Neck was without rigidity  Heart rhythm is regular  Lungs demonstrated normal respiratory rate and effort  Abdomen is soft bowel sounds present  Bilateral lower extremities were without edema or calf tenderness  Surgical incision was well approximated  Right osmany paresis noted

## 2018-08-12 RX ADMIN — ACETAMINOPHEN 650 MG: 325 TABLET ORAL at 12:49

## 2018-08-12 RX ADMIN — DOCUSATE SODIUM 100 MG: 100 CAPSULE, LIQUID FILLED ORAL at 08:13

## 2018-08-12 RX ADMIN — AMLODIPINE BESYLATE 10 MG: 5 TABLET ORAL at 08:13

## 2018-08-12 RX ADMIN — METHYLPHENIDATE HYDROCHLORIDE 5 MG: 5 TABLET ORAL at 06:31

## 2018-08-12 RX ADMIN — BROMOCRIPTINE MESYLATE 2.5 MG: 2.5 TABLET ORAL at 17:40

## 2018-08-12 RX ADMIN — FERROUS SULFATE TAB 325 MG (65 MG ELEMENTAL FE) 325 MG: 325 (65 FE) TAB at 08:13

## 2018-08-12 RX ADMIN — ACETAMINOPHEN 650 MG: 325 TABLET ORAL at 17:40

## 2018-08-12 RX ADMIN — Medication 1000 MCG: at 08:13

## 2018-08-12 RX ADMIN — BISACODYL 5 MG: 5 TABLET, COATED ORAL at 08:13

## 2018-08-12 RX ADMIN — CITALOPRAM HYDROBROMIDE 10 MG: 20 TABLET ORAL at 08:13

## 2018-08-12 RX ADMIN — LIDOCAINE 1 PATCH: 50 PATCH CUTANEOUS at 08:09

## 2018-08-12 RX ADMIN — ENOXAPARIN SODIUM 40 MG: 40 INJECTION SUBCUTANEOUS at 08:10

## 2018-08-12 RX ADMIN — CLOPIDOGREL 75 MG: 75 TABLET, FILM COATED ORAL at 08:13

## 2018-08-12 RX ADMIN — ACETAMINOPHEN 650 MG: 325 TABLET ORAL at 06:31

## 2018-08-12 RX ADMIN — SENNOSIDES 8.6 MG: 8.6 TABLET, FILM COATED ORAL at 08:13

## 2018-08-12 RX ADMIN — ASPIRIN 81 MG 81 MG: 81 TABLET ORAL at 08:13

## 2018-08-12 RX ADMIN — ATORVASTATIN CALCIUM 40 MG: 40 TABLET, FILM COATED ORAL at 17:40

## 2018-08-12 NOTE — PLAN OF CARE
Problem: Potential for Falls  Goal: Patient will remain free of falls  INTERVENTIONS:  - Assess patient frequently for physical needs  -  Identify cognitive and physical deficits and behaviors that affect risk of falls    -  [place] fall precautions as indicated by assessment   - Educate patient/family on patient safety including physical limitations  - Instruct patient to call for assistance with activity based on assessment  - Modify environment to reduce risk of injury  - Consider OT/PT consult to assist with strengthening/mobility   Outcome: Progressing      Problem: Prexisting or High Potential for Compromised Skin Integrity  Goal: Skin integrity is maintained or improved  INTERVENTIONS:  - Identify patients at risk for skin breakdown  - Assess and monitor skin integrity  - Assess and monitor nutrition and hydration status  - Monitor labs (i e  albumin)  - Assess for incontinence   - Turn and reposition patient  - Assist with mobility/ambulation  - Relieve pressure over bony prominences  - Avoid friction and shearing  - Provide appropriate hygiene as needed including keeping skin clean and dry  - Evaluate need for skin moisturizer/barrier cream  - Collaborate with interdisciplinary team (i e  Nutrition, Rehabilitation, etc )   - Patient/family teaching   Outcome: Progressing      Problem: PAIN - ADULT  Goal: Verbalizes/displays adequate comfort level or baseline comfort level  Interventions:  - Encourage patient to monitor pain and request assistance  - Assess pain using appropriate pain scale  - Administer analgesics based on type and severity of pain and evaluate response  - Implement non-pharmacological measures as appropriate and evaluate response  - Consider cultural and social influences on pain and pain management  - Notify physician/advanced practitioner if interventions unsuccessful or patient reports new pain   Outcome: Progressing      Problem: SAFETY ADULT  Goal: Maintain or return to baseline ADL function  INTERVENTIONS:  -  Assess patient's ability to carry out ADLs; assess patient's baseline for ADL function and identify physical deficits which impact ability to perform ADLs (bathing, care of mouth/teeth, toileting, grooming, dressing, etc )  - Assess/evaluate cause of self-care deficits   - Assess range of motion  - Assess patient's mobility; develop plan if impaired  - Assess patient's need for assistive devices and provide as appropriate  - Encourage maximum independence but intervene and supervise when necessary  ¯ Involve family in performance of ADLs  ¯ Assess for home care needs following discharge   ¯ Request OT consult to assist with ADL evaluation and planning for discharge  ¯ Provide patient education as appropriate   Outcome: Progressing    Goal: Maintain or return mobility status to optimal level  INTERVENTIONS:  - Assess patient's baseline mobility status (ambulation, transfers, stairs, etc )    - Identify cognitive and physical deficits and behaviors that affect mobility  - Identify mobility aids required to assist with transfers and/or ambulation (gait belt, sit-to-stand, lift, walker, cane, etc )  - [place] fall precautions as indicated by assessment  - Record patient progress and toleration of activity level on Mobility SBAR; progress patient to next Phase/Stage  - Instruct patient to call for assistance with activity based on assessment  -    Outcome: Progressing      Problem: DISCHARGE PLANNING  Goal: Discharge to home or other facility with appropriate resources  INTERVENTIONS:  - Identify barriers to discharge w/patient and caregiver  - Arrange for needed discharge resources and transportation as appropriate  - Identify discharge learning needs (meds, wound care, etc )  - Arrange for interpretive services to assist at discharge as needed  - Refer to Case Management Department for coordinating discharge planning if the patient needs post-hospital services based on physician/advanced practitioner order or complex needs related to functional status, cognitive ability, or social support system   Outcome: Progressing      Problem: Neurological Deficit  Goal: Neurological status is stable or improving  Interventions:  - Monitor and assess patient's level of consciousness, motor function, sensory function, and level of assistance needed for ADLs  - Monitor and report changes from baseline  Collaborate with interdisciplinary team to initiate plan and implement interventions as ordered  - Provide and maintain a safe environment  - Utilize seizure precautions  - Utilize fall precautions  - Utilize aspiration precautions  - Utilize bleeding precautions  Outcome: Progressing      Problem: Activity Intolerance/Impaired Mobility  Goal: Mobility/activity is maintained at optimum level for patient  Interventions:  - Assess and monitor patient  barriers to mobility and need for assistive/adaptive devices  - Assess patient's emotional response to limitations  - Collaborate with interdisciplinary team and initiate plans and interventions as ordered  - Encourage independent activity per ability   - Maintain proper body alignment  - Perform active/passive rom as tolerated/ordered  - Plan activities to conserve energy   - Turn patient   Outcome: Progressing      Problem: Communication Impairment  Goal: Ability to express needs and understand communication  Assess patient's communication skills and ability to understand information  Patient will demonstrate use of effective communication techniques, alternative methods of communication and understanding even if not able to speak  - Encourage communication and provide alternate methods of communication as needed  - Collaborate with case management/ for discharge needs  - Include patient/family/caregiver in decisions related to communication     Outcome: Progressing      Problem: Potential for Aspiration  Goal: Non-ventilated patient's risk of aspiration is minimized  Assess and monitor vital signs, respiratory status, and labs (WBC)  Monitor for signs of aspiration (tachypnea, cough, rales, wheezing, cyanosis, fever)  - Assess and monitor patient's ability to swallow  - Place patient up in chair to eat if possible  - HOB up at 90 degrees to eat if unable to get patient up into chair   - Supervise patient during oral intake  - Instruct patient to take small bites  - Instruct patient to take small single sips when taking liquids  - Follow patient-specific strategies generated by speech pathologist    Outcome: Progressing      Problem: Nutrition  Goal: Nutrition/Hydration status is improving  Monitor and assess patient's nutrition/hydration status for malnutrition (ex- brittle hair, bruises, dry skin, pale skin and conjunctiva, muscle wasting, smooth red tongue, and disorientation)  Collaborate with interdisciplinary team and initiate plan and interventions as ordered  Monitor patient's weight and dietary intake as ordered or per policy  Utilize nutrition screening tool and intervene per policy  Determine patient's food preferences and provide high-protein, high-caloric foods as appropriate  - Assist patient with eating   - Allow adequate time for meals   - Encourage patient to take dietary supplement as ordered  - Collaborate with clinical nutritionist   - Include patient/family/caregiver in decisions related to nutrition     Outcome: Progressing

## 2018-08-13 VITALS
RESPIRATION RATE: 18 BRPM | OXYGEN SATURATION: 100 % | SYSTOLIC BLOOD PRESSURE: 168 MMHG | DIASTOLIC BLOOD PRESSURE: 74 MMHG | WEIGHT: 152.56 LBS | BODY MASS INDEX: 30.76 KG/M2 | HEART RATE: 63 BPM | TEMPERATURE: 97.5 F | HEIGHT: 59 IN

## 2018-08-13 LAB
ANION GAP SERPL CALCULATED.3IONS-SCNC: 5 MMOL/L (ref 5–14)
BUN SERPL-MCNC: 16 MG/DL (ref 5–25)
CALCIUM SERPL-MCNC: 10.5 MG/DL (ref 8.4–10.2)
CHLORIDE SERPL-SCNC: 106 MMOL/L (ref 97–108)
CO2 SERPL-SCNC: 28 MMOL/L (ref 22–30)
CREAT SERPL-MCNC: 0.43 MG/DL (ref 0.6–1.2)
GFR SERPL CREATININE-BSD FRML MDRD: 92 ML/MIN/1.73SQ M
GLUCOSE P FAST SERPL-MCNC: 127 MG/DL (ref 70–99)
GLUCOSE SERPL-MCNC: 127 MG/DL (ref 70–99)
POTASSIUM SERPL-SCNC: 3.9 MMOL/L (ref 3.6–5)
SODIUM SERPL-SCNC: 139 MMOL/L (ref 137–147)

## 2018-08-13 PROCEDURE — 80048 BASIC METABOLIC PNL TOTAL CA: CPT | Performed by: PHYSICAL MEDICINE & REHABILITATION

## 2018-08-13 RX ORDER — ACETAMINOPHEN 325 MG/1
TABLET ORAL
Qty: 30 TABLET | Refills: 0 | Status: SHIPPED | OUTPATIENT
Start: 2018-08-13

## 2018-08-13 RX ORDER — METHYLPHENIDATE HYDROCHLORIDE 5 MG/1
5 TABLET ORAL
Qty: 10 TABLET | Refills: 0 | Status: SHIPPED | OUTPATIENT
Start: 2018-08-14 | End: 2019-07-10 | Stop reason: ALTCHOICE

## 2018-08-13 RX ADMIN — CLOPIDOGREL 75 MG: 75 TABLET, FILM COATED ORAL at 09:11

## 2018-08-13 RX ADMIN — SENNOSIDES 8.6 MG: 8.6 TABLET, FILM COATED ORAL at 09:11

## 2018-08-13 RX ADMIN — FERROUS SULFATE TAB 325 MG (65 MG ELEMENTAL FE) 325 MG: 325 (65 FE) TAB at 09:11

## 2018-08-13 RX ADMIN — DOCUSATE SODIUM 100 MG: 100 CAPSULE, LIQUID FILLED ORAL at 09:11

## 2018-08-13 RX ADMIN — ENOXAPARIN SODIUM 40 MG: 40 INJECTION SUBCUTANEOUS at 09:12

## 2018-08-13 RX ADMIN — ASPIRIN 81 MG 81 MG: 81 TABLET ORAL at 09:11

## 2018-08-13 RX ADMIN — ACETAMINOPHEN 650 MG: 325 TABLET ORAL at 06:45

## 2018-08-13 RX ADMIN — ACETAMINOPHEN 650 MG: 325 TABLET ORAL at 12:27

## 2018-08-13 RX ADMIN — CITALOPRAM HYDROBROMIDE 10 MG: 20 TABLET ORAL at 09:11

## 2018-08-13 RX ADMIN — METHYLPHENIDATE HYDROCHLORIDE 5 MG: 5 TABLET ORAL at 06:45

## 2018-08-13 RX ADMIN — AMLODIPINE BESYLATE 10 MG: 5 TABLET ORAL at 09:16

## 2018-08-13 RX ADMIN — Medication 1000 MCG: at 09:11

## 2018-08-13 NOTE — PLAN OF CARE
Problem: PAIN - ADULT  Goal: Verbalizes/displays adequate comfort level or baseline comfort level  Interventions:  - Encourage patient to monitor pain and request assistance  - Assess pain using appropriate pain scale  - Administer analgesics based on type and severity of pain and evaluate response  - Implement non-pharmacological measures as appropriate and evaluate response  - Consider cultural and social influences on pain and pain management  - Notify physician/advanced practitioner if interventions unsuccessful or patient reports new pain   Outcome: Adequate for Discharge      Problem: SAFETY ADULT  Goal: Maintain or return to baseline ADL function  INTERVENTIONS:  -  Assess patient's ability to carry out ADLs; assess patient's baseline for ADL function and identify physical deficits which impact ability to perform ADLs (bathing, care of mouth/teeth, toileting, grooming, dressing, etc )  - Assess/evaluate cause of self-care deficits   - Assess range of motion  - Assess patient's mobility; develop plan if impaired  - Assess patient's need for assistive devices and provide as appropriate  - Encourage maximum independence but intervene and supervise when necessary  ¯ Involve family in performance of ADLs  ¯ Assess for home care needs following discharge   ¯ Request OT consult to assist with ADL evaluation and planning for discharge  ¯ Provide patient education as appropriate   Outcome: Adequate for Discharge    Goal: Maintain or return mobility status to optimal level  INTERVENTIONS:  - Assess patient's baseline mobility status (ambulation, transfers, stairs, etc )    - Identify cognitive and physical deficits and behaviors that affect mobility  - Identify mobility aids required to assist with transfers and/or ambulation (gait belt, sit-to-stand, lift, walker, cane, etc )  - [place] fall precautions as indicated by assessment  - Record patient progress and toleration of activity level on Mobility SBAR; progress patient to next Phase/Stage  - Instruct patient to call for assistance with activity based on assessment  -    Outcome: Adequate for Discharge      Problem: DISCHARGE PLANNING  Goal: Discharge to home or other facility with appropriate resources  INTERVENTIONS:  - Identify barriers to discharge w/patient and caregiver  - Arrange for needed discharge resources and transportation as appropriate  - Identify discharge learning needs (meds, wound care, etc )  - Arrange for interpretive services to assist at discharge as needed  - Refer to Case Management Department for coordinating discharge planning if the patient needs post-hospital services based on physician/advanced practitioner order or complex needs related to functional status, cognitive ability, or social support system   Outcome: Adequate for Discharge      Problem: Neurological Deficit  Goal: Neurological status is stable or improving  Interventions:  - Monitor and assess patient's level of consciousness, motor function, sensory function, and level of assistance needed for ADLs  - Monitor and report changes from baseline  Collaborate with interdisciplinary team to initiate plan and implement interventions as ordered  - Provide and maintain a safe environment  - Utilize seizure precautions  - Utilize fall precautions  - Utilize aspiration precautions  - Utilize bleeding precautions  Outcome: Adequate for Discharge      Problem: Activity Intolerance/Impaired Mobility  Goal: Mobility/activity is maintained at optimum level for patient  Interventions:  - Assess and monitor patient  barriers to mobility and need for assistive/adaptive devices  - Assess patient's emotional response to limitations  - Collaborate with interdisciplinary team and initiate plans and interventions as ordered  - Encourage independent activity per ability   - Maintain proper body alignment  - Perform active/passive rom as tolerated/ordered    - Plan activities to conserve energy   - Turn patient   Outcome: Adequate for Discharge      Problem: Communication Impairment  Goal: Ability to express needs and understand communication  Assess patient's communication skills and ability to understand information  Patient will demonstrate use of effective communication techniques, alternative methods of communication and understanding even if not able to speak  - Encourage communication and provide alternate methods of communication as needed  - Collaborate with case management/ for discharge needs  - Include patient/family/caregiver in decisions related to communication  Outcome: Adequate for Discharge      Problem: Potential for Aspiration  Goal: Non-ventilated patient's risk of aspiration is minimized  Assess and monitor vital signs, respiratory status, and labs (WBC)  Monitor for signs of aspiration (tachypnea, cough, rales, wheezing, cyanosis, fever)  - Assess and monitor patient's ability to swallow  - Place patient up in chair to eat if possible  - HOB up at 90 degrees to eat if unable to get patient up into chair   - Supervise patient during oral intake  - Instruct patient to take small bites  - Instruct patient to take small single sips when taking liquids  - Follow patient-specific strategies generated by speech pathologist    Outcome: Adequate for Discharge      Problem: Nutrition  Goal: Nutrition/Hydration status is improving  Monitor and assess patient's nutrition/hydration status for malnutrition (ex- brittle hair, bruises, dry skin, pale skin and conjunctiva, muscle wasting, smooth red tongue, and disorientation)  Collaborate with interdisciplinary team and initiate plan and interventions as ordered  Monitor patient's weight and dietary intake as ordered or per policy  Utilize nutrition screening tool and intervene per policy  Determine patient's food preferences and provide high-protein, high-caloric foods as appropriate       - Assist patient with eating   - Allow adequate time for meals   - Encourage patient to take dietary supplement as ordered  - Collaborate with clinical nutritionist   - Include patient/family/caregiver in decisions related to nutrition     Outcome: Adequate for Discharge

## 2018-08-13 NOTE — NURSING NOTE
PT is oriented to herself, she is going to [facility] today, they will be picking her up for transportation  Midline in LUE removed by RN, site is intact with small amount of gary blood on guaze  Pressure applied for five minutes, guaze and paper tape intact  Family is present, all belongings were packed up, daughter took pt belongings to her car and will transport them to [facility]  Cpap was also taken by the daughter  Report called to Shara Sheth at [facility]  Awaiting transportation

## 2018-08-14 ENCOUNTER — DOCUMENTATION (OUTPATIENT)
Dept: INPATIENT UNIT | Facility: HOSPITAL | Age: 83
End: 2018-08-14

## 2018-09-11 DIAGNOSIS — I10 ACCELERATED HYPERTENSION: ICD-10-CM

## 2018-09-11 DIAGNOSIS — I63.9 CVA (CEREBRAL VASCULAR ACCIDENT) (HCC): ICD-10-CM

## 2018-09-11 DIAGNOSIS — D64.9 ANEMIA: ICD-10-CM

## 2018-09-11 DIAGNOSIS — E08.21 DIABETES MELLITUS DUE TO UNDERLYING CONDITION WITH DIABETIC NEPHROPATHY, WITHOUT LONG-TERM CURRENT USE OF INSULIN (HCC): Primary | ICD-10-CM

## 2018-09-11 DIAGNOSIS — E53.8 B12 DEFICIENCY: ICD-10-CM

## 2018-09-11 DIAGNOSIS — D35.2 PITUITARY ADENOMA (HCC): ICD-10-CM

## 2018-09-11 DIAGNOSIS — F32.9 REACTIVE DEPRESSION: ICD-10-CM

## 2018-09-26 DIAGNOSIS — E13.9 DIABETES 1.5, MANAGED AS TYPE 2 (HCC): Primary | ICD-10-CM

## 2018-09-27 RX ORDER — AMLODIPINE BESYLATE 10 MG/1
TABLET ORAL
Qty: 30 TABLET | Refills: 0 | Status: SHIPPED | OUTPATIENT
Start: 2018-09-27 | End: 2018-10-07 | Stop reason: SDUPTHER

## 2018-09-27 RX ORDER — LANOLIN ALCOHOL/MO/W.PET/CERES
CREAM (GRAM) TOPICAL
Qty: 30 TABLET | Refills: 0 | Status: SHIPPED | OUTPATIENT
Start: 2018-09-27 | End: 2018-10-07 | Stop reason: SDUPTHER

## 2018-09-27 RX ORDER — GLUCAGON HYDROCHLORIDE 1 MG
KIT INJECTION
Qty: 1 ML | Refills: 0 | Status: SHIPPED | OUTPATIENT
Start: 2018-09-27 | End: 2018-10-07 | Stop reason: SDUPTHER

## 2018-09-27 RX ORDER — UBIQUINOL 100 MG
CAPSULE ORAL
Qty: 1 EACH | Refills: 0 | Status: SHIPPED | OUTPATIENT
Start: 2018-09-27 | End: 2018-10-07 | Stop reason: SDUPTHER

## 2018-09-27 RX ORDER — CITALOPRAM 10 MG/1
TABLET ORAL
Qty: 15 TABLET | Refills: 0 | Status: SHIPPED | OUTPATIENT
Start: 2018-09-27 | End: 2018-10-07 | Stop reason: SDUPTHER

## 2018-09-27 RX ORDER — INSULIN GLARGINE 100 [IU]/ML
INJECTION, SOLUTION SUBCUTANEOUS
Qty: 3 ML | Refills: 0 | Status: SHIPPED | OUTPATIENT
Start: 2018-09-27 | End: 2018-10-07 | Stop reason: SDUPTHER

## 2018-09-27 RX ORDER — ATORVASTATIN CALCIUM 40 MG/1
TABLET, FILM COATED ORAL
Qty: 30 TABLET | Refills: 0 | Status: SHIPPED | OUTPATIENT
Start: 2018-09-27 | End: 2018-10-07 | Stop reason: SDUPTHER

## 2018-09-27 RX ORDER — BROMOCRIPTINE MESYLATE 2.5 MG/1
TABLET ORAL
Qty: 8 TABLET | Refills: 0 | Status: SHIPPED | OUTPATIENT
Start: 2018-09-27 | End: 2018-10-07 | Stop reason: SDUPTHER

## 2018-09-27 RX ORDER — FERROUS SULFATE 325(65) MG
TABLET ORAL
Qty: 30 TABLET | Refills: 0 | Status: SHIPPED | OUTPATIENT
Start: 2018-09-27 | End: 2018-10-07 | Stop reason: SDUPTHER

## 2018-09-27 RX ORDER — ASPIRIN 81 MG/1
TABLET, CHEWABLE ORAL
Qty: 30 TABLET | Refills: 0 | Status: SHIPPED | OUTPATIENT
Start: 2018-09-27 | End: 2018-10-07 | Stop reason: SDUPTHER

## 2018-09-29 ENCOUNTER — DOCUMENTATION (OUTPATIENT)
Dept: INPATIENT UNIT | Facility: HOSPITAL | Age: 83
End: 2018-09-29

## 2018-10-07 DIAGNOSIS — E13.9 DIABETES 1.5, MANAGED AS TYPE 2 (HCC): ICD-10-CM

## 2018-10-07 DIAGNOSIS — I10 ACCELERATED HYPERTENSION: ICD-10-CM

## 2018-10-07 DIAGNOSIS — D35.2 PITUITARY ADENOMA (HCC): ICD-10-CM

## 2018-10-07 DIAGNOSIS — S72.001A CLOSED FRACTURE OF RIGHT HIP, INITIAL ENCOUNTER (HCC): ICD-10-CM

## 2018-10-07 DIAGNOSIS — E53.8 B12 DEFICIENCY: ICD-10-CM

## 2018-10-07 DIAGNOSIS — Z48.89 AFTERCARE FOLLOWING SURGERY FOR INJURY OR TRAUMA: ICD-10-CM

## 2018-10-07 DIAGNOSIS — I63.9 CVA (CEREBRAL VASCULAR ACCIDENT) (HCC): ICD-10-CM

## 2018-10-07 DIAGNOSIS — F32.9 REACTIVE DEPRESSION: ICD-10-CM

## 2018-10-07 DIAGNOSIS — E08.21 DIABETES MELLITUS DUE TO UNDERLYING CONDITION WITH DIABETIC NEPHROPATHY, WITHOUT LONG-TERM CURRENT USE OF INSULIN (HCC): ICD-10-CM

## 2018-10-07 DIAGNOSIS — D64.9 ANEMIA: ICD-10-CM

## 2018-10-08 RX ORDER — ASPIRIN 81 MG/1
TABLET, CHEWABLE ORAL
Qty: 30 TABLET | Refills: 0 | Status: SHIPPED | OUTPATIENT
Start: 2018-10-08 | End: 2019-05-03 | Stop reason: SDUPTHER

## 2018-10-08 RX ORDER — ATORVASTATIN CALCIUM 40 MG/1
TABLET, FILM COATED ORAL
Qty: 30 TABLET | Refills: 0 | Status: SHIPPED | OUTPATIENT
Start: 2018-10-08 | End: 2019-05-03 | Stop reason: SDUPTHER

## 2018-10-08 RX ORDER — GLUCAGON HYDROCHLORIDE 1 MG
KIT INJECTION
Qty: 1 ML | Refills: 0 | Status: SHIPPED | OUTPATIENT
Start: 2018-10-08

## 2018-10-08 RX ORDER — FERROUS SULFATE 325(65) MG
TABLET ORAL
Qty: 30 TABLET | Refills: 0 | Status: SHIPPED | OUTPATIENT
Start: 2018-10-08 | End: 2019-07-10 | Stop reason: ALTCHOICE

## 2018-10-08 RX ORDER — ALENDRONATE SODIUM 70 MG/1
TABLET ORAL
Qty: 1 TABLET | Refills: 0 | Status: SHIPPED | OUTPATIENT
Start: 2018-10-08 | End: 2018-11-12 | Stop reason: SDUPTHER

## 2018-10-08 RX ORDER — DOCUSATE SODIUM AND SENNOSIDES 8.6; 5 MG/1; MG/1
TABLET, FILM COATED ORAL
Qty: 60 TABLET | Refills: 0 | Status: SHIPPED | OUTPATIENT
Start: 2018-10-08 | End: 2018-12-04 | Stop reason: SDUPTHER

## 2018-10-08 RX ORDER — INSULIN GLARGINE 100 [IU]/ML
INJECTION, SOLUTION SUBCUTANEOUS
Qty: 3 ML | Refills: 0 | Status: SHIPPED | OUTPATIENT
Start: 2018-10-08 | End: 2019-07-10 | Stop reason: CLARIF

## 2018-10-08 RX ORDER — BROMOCRIPTINE MESYLATE 2.5 MG/1
TABLET ORAL
Qty: 8 TABLET | Refills: 0 | Status: SHIPPED | OUTPATIENT
Start: 2018-10-08 | End: 2019-05-03 | Stop reason: SDUPTHER

## 2018-10-08 RX ORDER — LANOLIN ALCOHOL/MO/W.PET/CERES
CREAM (GRAM) TOPICAL
Qty: 30 TABLET | Refills: 0 | Status: SHIPPED | OUTPATIENT
Start: 2018-10-08 | End: 2019-05-03 | Stop reason: SDUPTHER

## 2018-10-08 RX ORDER — CITALOPRAM 10 MG/1
TABLET ORAL
Qty: 15 TABLET | Refills: 0 | Status: SHIPPED | OUTPATIENT
Start: 2018-10-08 | End: 2019-05-03 | Stop reason: SDUPTHER

## 2018-10-08 RX ORDER — AMLODIPINE BESYLATE 10 MG/1
TABLET ORAL
Qty: 30 TABLET | Refills: 0 | Status: SHIPPED | OUTPATIENT
Start: 2018-10-08 | End: 2019-05-03 | Stop reason: SDUPTHER

## 2018-10-08 RX ORDER — UBIQUINOL 100 MG
CAPSULE ORAL
Qty: 1 EACH | Refills: 0 | Status: SHIPPED | OUTPATIENT
Start: 2018-10-08

## 2018-11-12 DIAGNOSIS — S72.001A CLOSED FRACTURE OF RIGHT HIP, INITIAL ENCOUNTER (HCC): ICD-10-CM

## 2018-11-12 RX ORDER — ALENDRONATE SODIUM 70 MG/1
TABLET ORAL
Qty: 1 TABLET | Refills: 0 | Status: SHIPPED | OUTPATIENT
Start: 2018-11-12 | End: 2018-12-04 | Stop reason: SDUPTHER

## 2018-12-04 DIAGNOSIS — S72.001A CLOSED FRACTURE OF RIGHT HIP, INITIAL ENCOUNTER (HCC): ICD-10-CM

## 2018-12-04 DIAGNOSIS — Z48.89 AFTERCARE FOLLOWING SURGERY FOR INJURY OR TRAUMA: ICD-10-CM

## 2018-12-05 RX ORDER — ALENDRONATE SODIUM 70 MG/1
TABLET ORAL
Qty: 1 TABLET | Refills: 0 | Status: SHIPPED | OUTPATIENT
Start: 2018-12-05 | End: 2019-07-02 | Stop reason: SDUPTHER

## 2018-12-05 RX ORDER — DOCUSATE SODIUM AND SENNOSIDES 8.6; 5 MG/1; MG/1
TABLET, FILM COATED ORAL
Qty: 60 TABLET | Refills: 0 | Status: SHIPPED | OUTPATIENT
Start: 2018-12-05 | End: 2019-05-03 | Stop reason: SDUPTHER

## 2019-05-03 DIAGNOSIS — F32.9 REACTIVE DEPRESSION: ICD-10-CM

## 2019-05-03 DIAGNOSIS — I63.9 CVA (CEREBRAL VASCULAR ACCIDENT) (HCC): ICD-10-CM

## 2019-05-03 DIAGNOSIS — S72.001A CLOSED FRACTURE OF RIGHT HIP, INITIAL ENCOUNTER (HCC): ICD-10-CM

## 2019-05-03 DIAGNOSIS — Z48.89 AFTERCARE FOLLOWING SURGERY FOR INJURY OR TRAUMA: ICD-10-CM

## 2019-05-03 DIAGNOSIS — K59.00 CONSTIPATION: ICD-10-CM

## 2019-05-03 DIAGNOSIS — D35.2 PITUITARY ADENOMA (HCC): ICD-10-CM

## 2019-05-03 DIAGNOSIS — E53.8 B12 DEFICIENCY: ICD-10-CM

## 2019-05-03 DIAGNOSIS — I10 ACCELERATED HYPERTENSION: ICD-10-CM

## 2019-05-06 RX ORDER — BROMOCRIPTINE MESYLATE 2.5 MG/1
TABLET ORAL
Qty: 8 TABLET | Refills: 0 | Status: SHIPPED | OUTPATIENT
Start: 2019-05-06 | End: 2019-07-02 | Stop reason: SDUPTHER

## 2019-05-06 RX ORDER — DOCUSATE SODIUM AND SENNOSIDES 8.6; 5 MG/1; MG/1
TABLET, FILM COATED ORAL
Qty: 60 TABLET | Refills: 0 | Status: SHIPPED | OUTPATIENT
Start: 2019-05-06 | End: 2019-07-02 | Stop reason: SDUPTHER

## 2019-05-06 RX ORDER — CITALOPRAM 10 MG/1
TABLET ORAL
Qty: 15 TABLET | Refills: 0 | Status: SHIPPED | OUTPATIENT
Start: 2019-05-06 | End: 2019-07-02 | Stop reason: SDUPTHER

## 2019-05-06 RX ORDER — ATORVASTATIN CALCIUM 40 MG/1
TABLET, FILM COATED ORAL
Qty: 30 TABLET | Refills: 0 | Status: SHIPPED | OUTPATIENT
Start: 2019-05-06 | End: 2019-07-02 | Stop reason: SDUPTHER

## 2019-05-06 RX ORDER — BISACODYL 5 MG
TABLET, DELAYED RELEASE (ENTERIC COATED) ORAL
Qty: 60 TABLET | Refills: 0 | Status: SHIPPED | OUTPATIENT
Start: 2019-05-06 | End: 2019-07-04 | Stop reason: SDUPTHER

## 2019-05-06 RX ORDER — LANOLIN ALCOHOL/MO/W.PET/CERES
CREAM (GRAM) TOPICAL
Qty: 30 TABLET | Refills: 0 | Status: SHIPPED | OUTPATIENT
Start: 2019-05-06 | End: 2019-07-02 | Stop reason: SDUPTHER

## 2019-05-06 RX ORDER — ASPIRIN 81 MG/1
TABLET, CHEWABLE ORAL
Qty: 30 TABLET | Refills: 0 | Status: SHIPPED | OUTPATIENT
Start: 2019-05-06 | End: 2019-07-02 | Stop reason: SDUPTHER

## 2019-05-06 RX ORDER — AMLODIPINE BESYLATE 10 MG/1
TABLET ORAL
Qty: 30 TABLET | Refills: 0 | Status: SHIPPED | OUTPATIENT
Start: 2019-05-06 | End: 2019-07-02 | Stop reason: SDUPTHER

## 2019-06-17 ENCOUNTER — LAB REQUISITION (OUTPATIENT)
Dept: LAB | Facility: HOSPITAL | Age: 84
End: 2019-06-17
Payer: MEDICARE

## 2019-06-17 DIAGNOSIS — R31.9 HEMATURIA: ICD-10-CM

## 2019-06-17 PROCEDURE — 87077 CULTURE AEROBIC IDENTIFY: CPT | Performed by: INTERNAL MEDICINE

## 2019-06-17 PROCEDURE — 87186 SC STD MICRODIL/AGAR DIL: CPT | Performed by: INTERNAL MEDICINE

## 2019-06-17 PROCEDURE — 87086 URINE CULTURE/COLONY COUNT: CPT | Performed by: INTERNAL MEDICINE

## 2019-06-19 LAB
BACTERIA UR CULT: ABNORMAL
BACTERIA UR CULT: ABNORMAL

## 2019-07-02 DIAGNOSIS — I10 ACCELERATED HYPERTENSION: ICD-10-CM

## 2019-07-02 DIAGNOSIS — Z48.89 AFTERCARE FOLLOWING SURGERY FOR INJURY OR TRAUMA: ICD-10-CM

## 2019-07-02 DIAGNOSIS — F32.9 REACTIVE DEPRESSION: ICD-10-CM

## 2019-07-02 DIAGNOSIS — I63.9 CVA (CEREBRAL VASCULAR ACCIDENT) (HCC): ICD-10-CM

## 2019-07-02 DIAGNOSIS — E53.8 B12 DEFICIENCY: ICD-10-CM

## 2019-07-02 DIAGNOSIS — S72.001A CLOSED FRACTURE OF RIGHT HIP, INITIAL ENCOUNTER (HCC): ICD-10-CM

## 2019-07-02 DIAGNOSIS — D35.2 PITUITARY ADENOMA (HCC): ICD-10-CM

## 2019-07-04 RX ORDER — BROMOCRIPTINE MESYLATE 2.5 MG/1
TABLET ORAL
Qty: 8 TABLET | Refills: 0 | Status: SHIPPED | OUTPATIENT
Start: 2019-07-04 | End: 2019-11-27 | Stop reason: SDUPTHER

## 2019-07-04 RX ORDER — DOCUSATE SODIUM 100 MG/1
CAPSULE ORAL
Qty: 30 CAPSULE | Refills: 0 | Status: SHIPPED | OUTPATIENT
Start: 2019-07-04 | End: 2019-11-27 | Stop reason: SDUPTHER

## 2019-07-04 RX ORDER — DOCUSATE SODIUM AND SENNOSIDES 8.6; 5 MG/1; MG/1
TABLET, FILM COATED ORAL
Qty: 60 TABLET | Refills: 0 | Status: SHIPPED | OUTPATIENT
Start: 2019-07-04 | End: 2019-11-27 | Stop reason: SDUPTHER

## 2019-07-04 RX ORDER — ALENDRONATE SODIUM 70 MG/1
TABLET ORAL
Qty: 1 TABLET | Refills: 0 | Status: SHIPPED | OUTPATIENT
Start: 2019-07-04 | End: 2019-10-10 | Stop reason: SDUPTHER

## 2019-07-04 RX ORDER — LANOLIN ALCOHOL/MO/W.PET/CERES
CREAM (GRAM) TOPICAL
Qty: 30 TABLET | Refills: 0 | Status: SHIPPED | OUTPATIENT
Start: 2019-07-04 | End: 2019-11-27 | Stop reason: SDUPTHER

## 2019-07-04 RX ORDER — ASPIRIN 81 MG/1
TABLET, CHEWABLE ORAL
Qty: 30 TABLET | Refills: 0 | Status: SHIPPED | OUTPATIENT
Start: 2019-07-04 | End: 2019-11-27 | Stop reason: SDUPTHER

## 2019-07-04 RX ORDER — ATORVASTATIN CALCIUM 40 MG/1
TABLET, FILM COATED ORAL
Qty: 30 TABLET | Refills: 0 | Status: SHIPPED | OUTPATIENT
Start: 2019-07-04 | End: 2019-11-27 | Stop reason: SDUPTHER

## 2019-07-04 RX ORDER — AMLODIPINE BESYLATE 10 MG/1
TABLET ORAL
Qty: 30 TABLET | Refills: 0 | Status: SHIPPED | OUTPATIENT
Start: 2019-07-04 | End: 2019-11-27 | Stop reason: SDUPTHER

## 2019-07-04 RX ORDER — CITALOPRAM 10 MG/1
TABLET ORAL
Qty: 15 TABLET | Refills: 0 | Status: SHIPPED | OUTPATIENT
Start: 2019-07-04 | End: 2019-11-27 | Stop reason: SDUPTHER

## 2019-07-09 DIAGNOSIS — Z51.89 ENCOUNTER FOR WOUND CARE: Primary | ICD-10-CM

## 2019-07-10 ENCOUNTER — NURSING HOME VISIT (OUTPATIENT)
Dept: GERIATRICS | Facility: OTHER | Age: 84
End: 2019-07-10
Payer: MEDICARE

## 2019-07-10 VITALS
RESPIRATION RATE: 16 BRPM | BODY MASS INDEX: 31.51 KG/M2 | SYSTOLIC BLOOD PRESSURE: 146 MMHG | DIASTOLIC BLOOD PRESSURE: 80 MMHG | WEIGHT: 156 LBS | TEMPERATURE: 98.6 F | OXYGEN SATURATION: 94 % | HEART RATE: 80 BPM

## 2019-07-10 DIAGNOSIS — I63.312 CEREBROVASCULAR ACCIDENT (CVA) DUE TO THROMBOSIS OF LEFT MIDDLE CEREBRAL ARTERY (HCC): ICD-10-CM

## 2019-07-10 DIAGNOSIS — F02.80 LATE ONSET ALZHEIMER'S DISEASE WITHOUT BEHAVIORAL DISTURBANCE (HCC): Chronic | ICD-10-CM

## 2019-07-10 DIAGNOSIS — H61.23 BILATERAL IMPACTED CERUMEN: ICD-10-CM

## 2019-07-10 DIAGNOSIS — G30.1 LATE ONSET ALZHEIMER'S DISEASE WITHOUT BEHAVIORAL DISTURBANCE (HCC): Chronic | ICD-10-CM

## 2019-07-10 DIAGNOSIS — I10 ESSENTIAL HYPERTENSION: ICD-10-CM

## 2019-07-10 DIAGNOSIS — Z79.4 TYPE 2 DIABETES MELLITUS WITH HYPEROSMOLARITY WITHOUT COMA, WITH LONG-TERM CURRENT USE OF INSULIN (HCC): Primary | Chronic | ICD-10-CM

## 2019-07-10 DIAGNOSIS — E22.0 ACROMEGALIA (HCC): ICD-10-CM

## 2019-07-10 DIAGNOSIS — G47.33 OSA (OBSTRUCTIVE SLEEP APNEA): ICD-10-CM

## 2019-07-10 DIAGNOSIS — R23.8 SKIN BREAKDOWN: ICD-10-CM

## 2019-07-10 DIAGNOSIS — E11.00 TYPE 2 DIABETES MELLITUS WITH HYPEROSMOLARITY WITHOUT COMA, WITH LONG-TERM CURRENT USE OF INSULIN (HCC): Primary | Chronic | ICD-10-CM

## 2019-07-10 PROCEDURE — 99310 SBSQ NF CARE HIGH MDM 45: CPT | Performed by: INTERNAL MEDICINE

## 2019-07-10 RX ORDER — CHOLESTYRAMINE 4 G/9G
1 POWDER, FOR SUSPENSION ORAL
COMMUNITY
End: 2019-07-29 | Stop reason: SDUPTHER

## 2019-07-10 NOTE — ASSESSMENT & PLAN NOTE
Patient's systolic blood pressure was 146 currently on amlodipine 10 mg daily will need to add an ACE-inhibitor in view of her diabetes mellitus will give low-dose lisinopril 5 mg daily

## 2019-07-10 NOTE — ASSESSMENT & PLAN NOTE
She still has fairly good memory for old events  Her main difficulty is with recent events  She is able to carry out conversations without any particular problems  And her reasoning is profound at times

## 2019-07-10 NOTE — ASSESSMENT & PLAN NOTE
Patient was evaluated at [facility] on July 9th  She had evidence of skin breakdown in both inguinal areas and the sacral coccyx area  She had a linear stage I lesion on her lower back  She has been using a zinc based cream for protection  I suspect this can breakdown is secondary to chemical irritation from the diapers that she uses  She will be evaluated by the wound nurse today from [facility]

## 2019-07-10 NOTE — PROGRESS NOTES
[address] [place] [address] Progress Note    NAME: Jimy Castillo  AGE: 80 y o  SEX: female [id number]    DATE OF ENCOUNTER: 7/10/2019    Assessment and Plan     Problem List Items Addressed This Visit        Endocrine    Type 2 diabetes mellitus, with long-term current use of insulin ([address] ) - Primary (Chronic)     Lab Results   Component Value Date    HGBA1C 8 1 (H) 07/18/2018       No results for input(s): POCGLU in the last 72 hours  Blood Sugar Average: Last 72 hrs:   her last hemoglobin A1c was 8 1 this was obtained on April 10th of 2019  She gets her hemoglobin A1c is every 3 months currently on tressiba 38 units daily  We also monitor her diet on cut back on carbohydrates  Acromegalia [facility])     Her acromegaly was diagnosed by Endocrinology currently on bromocriptine  Respiratory    RAZIA (obstructive sleep apnea)     She utilizes her of his sleep apnea device on a regular basis usually gets to sleep between 9-10 hours at night with adequate saturations over 89%  Cardiovascular and Mediastinum    Hypertension     Patient's systolic blood pressure was 146 currently on amlodipine 10 mg daily will need to add an ACE-inhibitor in view of her diabetes mellitus will give low-dose lisinopril 5 mg daily  CVA (cerebral vascular accident) [facility])     She has improved dramatically since her stroke  She is able to raise herself and maneuver into the potty with assistance of 1  She is also able to brace herself and maneuver herself into bed with assistance of 1  Nervous and Auditory    Alzheimers disease (Chronic)     She still has fairly good memory for old events  Her main difficulty is with recent events  She is able to carry out conversations without any particular problems  And her reasoning is profound at times  Bilateral impacted cerumen     She has bilateral cerumen impaction her ears will need to be flushed  Musculoskeletal and Integument    Skin breakdown     Patient was evaluated at [facility] living on July 9th  She had evidence of skin breakdown in both inguinal areas and the sacral coccyx area  She had a linear stage I lesion on her lower back  She has been using a zinc based cream for protection  I suspect this can breakdown is secondary to chemical irritation from the diapers that she uses  She will be evaluated by the wound nurse today from [facility]  All medications and routine orders were reviewed and updated as needed  Plan discussed with: Patient    Chief Complaint     No chief complaint on file  History of Present Illness     Patient is an 72-year-old  female who was evaluated at [facility] on July 9th of 2019  The patient has been in the dementia unit since her stroke last year  She is able to assist with her care  Her cognition is excellent for old events  Her main issue is with recent events  The patient was switched to [place] long-acting insulin and currently is at 38 units  Her last hemoglobin A1c was 8 1 in April and she will be getting an another 1 in July  The patients diet is monitored  She had history of hypertension and had been on amlodipine 10 mg daily  She will need to be placed on Ace inhibitor in order to give her renal protection  The patient has developed increased skin irritation since late June  They had been placing as zinc based cream to the groin area  She has history of urinary incontinence and I suspect irritation from the urine has been the main problem  She is also stool incontinent at times  Her weight has remained stable  Because of the persistence of her skin breakdown will have the wound team assess her  She has history of acromegaly that was diagnosed by Endocrinology and she has remained on the bromocriptine on a regular basis    Her eyes have been recently checked by Ophthalmology and she has dental care also on a regular basis  She has a partial denture and her right upper arcade  HISTORY:  Past Surgical History:   Procedure Laterality Date    CARPAL TUNNEL RELEASE      FRACTURE SURGERY      LA OPEN RX FEMUR FX+INTRAMED RADHA Right 2018    Procedure: INSERTION NAIL IM FEMUR ANTEGRADE (TROCHANTERIC); Surgeon: Dexter Do MD;  Location:  MAIN OR;  Service: Orthopedics      Past Medical History:   Diagnosis Date    Ankle fracture     Arthritis     Dementia     Diabetes ([place] [address] )     Hypertension     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[place] [address] )     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[place] [address] )     Stroke ([place] [address] )      No family history on file  Social History     Socioeconomic History    Marital status:       Spouse name: Not on file    Number of children: Not on file    Years of education: Not on file    Highest education level: Not on file   Occupational History    Not on file   Social Needs    Financial resource strain: Not on file    Food insecurity:     Worry: Not on file     Inability: Not on file    Transportation needs:     Medical: Not on file     Non-medical: Not on file   Tobacco Use    Smoking status: Former Smoker     Packs/day: 1 00     Types: Cigarettes     Last attempt to quit: 1979     Years since quittin 9    Smokeless tobacco: Never Used   Substance and Sexual Activity    Alcohol use: No    Drug use: No    Sexual activity: Never   Lifestyle    Physical activity:     Days per week: Not on file     Minutes per session: Not on file    Stress: Not on file   Relationships    Social connections:     Talks on phone: Not on file     Gets together: Not on file     Attends Anabaptism service: Not on file     Active member of club or organization: Not on file     Attends meetings of clubs or organizations: Not on file     Relationship status: Not on file    Intimate partner violence:     Fear of current or ex partner: Not on file     Emotionally abused: Not on file     Physically abused: Not on file     Forced sexual activity: Not on file   Other Topics Concern    Not on file   Social History Narrative    Not on file       Allergies: Allergies   Allergen Reactions    Amoxicillin     Ibuprofen        Review of Systems     Review of Systems   Constitutional: Negative  HENT: Negative  Eyes: Negative  Respiratory: Negative  Gastrointestinal: Negative  Endocrine: Negative  Genitourinary: Positive for urgency  Musculoskeletal: Negative  Negative for gait problem  Skin: Positive for color change and rash  Her main skin breakdown is in the inguinal and sacral area  Allergic/Immunologic: Negative  Neurological: Positive for weakness  Hematological: Negative  Psychiatric/Behavioral: Positive for confusion and sleep disturbance         PHQ-9 Depression Screening    PHQ-9:    Frequency of the following problems over the past two weeks:              Medications and orders       Current Outpatient Medications:     acetaminophen (TYLENOL) 325 mg tablet, 2 pills three times per day (with meals ), Disp: 30 tablet, Rfl: 0    Alcohol Swabs (ALCOHOL PREP) 70 % PADS, TEST BLOOD SUGAR ONCE DAILY, Disp: 1 each, Rfl: 0    alendronate (FOSAMAX) 70 mg tablet, TAKE 1 TABLET BY MOUTH WEEKLY (WEDNESDAY) (NURSE TO REORDER), Disp: 1 tablet, Rfl: 0    amLODIPine (NORVASC) 10 mg tablet, TAKE 1 TABLET BY MOUTH ONCE DAILY (HOLD FOR SBP<100 OR HR<60), Disp: 30 tablet, Rfl: 0    aspirin 81 mg chewable tablet, CHEW 1 TABLET BY MOUTH ONCE DAILY, Disp: 30 tablet, Rfl: 0    atorvastatin (LIPITOR) 40 mg tablet, TAKE 1 TABLET BY MOUTH IN THE EVENING, Disp: 30 tablet, Rfl: 0    bisacodyl (DULCOLAX) 10 mg suppository, Insert 1 suppository (10 mg total) into the rectum daily as needed for constipation, Disp: 12 suppository, Rfl: 0    bromocriptine (PARLODEL) 2 5 mg tablet, TAKE 14 TABLET (0 625MG) BY MOUTH AT BEDTIME, Disp: 8 tablet, Rfl: 0    citalopram (CeleXA) 10 mg tablet, TAKE 1/2 TABLET (5MG) BY MOUTH ONCE DAILY -MAY CAUSE DROWSINESS -NOALCOHOL WHILE ON THIS!, Disp: 15 tablet, Rfl: 0    clopidogrel (PLAVIX) 75 mg tablet, Take 1 tablet (75 mg total) by mouth daily, Disp: 30 tablet, Rfl: 0    cyanocobalamin (VITAMIN B-12) 1,000 mcg tablet, TAKE 1 TABLET BY MOUTH ONCE DAILY, Disp: 30 tablet, Rfl: 0    DOCQLACE 100 MG capsule, TAKE 1 CAPSULE BY MOUTH ONCE DAILY, Disp: 30 capsule, Rfl: 0    DOK PLUS 50-8 6 MG per tablet, TAKE 2 TABLETS BY MOUTH ONCE DAILY (HOLD FOR LOOSE STOOLS) -DRINKPLENTY OF WATER, Disp: 60 tablet, Rfl: 0    GLUCAGEN HYPOKIT 1 MG injection, ADMINISTER IF GLUCOSE <70 @ 15MINS AFTER GLUCOSE GEL ADMINISTERED, Disp: 1 mL, Rfl: 0    NOVOFINE AUTOCOVER 30G X 8 MM MISC, , Disp: , Rfl:        Objective     Vitals: There were no vitals filed for this visit  Physical Exam    Pertinent Laboratory/Diagnostic Studies: The following labs/studies were reviewed please see facility chart for details

## 2019-07-10 NOTE — ASSESSMENT & PLAN NOTE
She has improved dramatically since her stroke  She is able to raise herself and maneuver into the potty with assistance of 1  She is also able to brace herself and maneuver herself into bed with assistance of 1

## 2019-07-10 NOTE — ASSESSMENT & PLAN NOTE
She utilizes her of his sleep apnea device on a regular basis usually gets to sleep between 9-10 hours at night with adequate saturations over 89%

## 2019-07-10 NOTE — ASSESSMENT & PLAN NOTE
Lab Results   Component Value Date    HGBA1C 8 1 (H) 07/18/2018       No results for input(s): POCGLU in the last 72 hours  Blood Sugar Average: Last 72 hrs:   her last hemoglobin A1c was 8 1 this was obtained on April 10th of 2019  She gets her hemoglobin A1c is every 3 months currently on tressiba 38 units daily  We also monitor her diet on cut back on carbohydrates

## 2019-07-10 NOTE — PATIENT INSTRUCTIONS
1 -patient will be seen by the wound team from [facility] on July 10th of 2019  2 -patient had a face-to-face evaluation on July 9th of 2019    3  -patient will be started on low-dose lisinopril 5 mg daily for renal protection    4 -she has blood work pending and another 2 weeks a hemoglobin A1c will be obtained microalbumin and a CMP

## 2019-07-26 DIAGNOSIS — E78.01 FAMILIAL HYPERCHOLESTEROLEMIA: Primary | ICD-10-CM

## 2019-07-29 RX ORDER — CHOLESTYRAMINE LIGHT 4 G/5.7G
POWDER, FOR SUSPENSION ORAL
Qty: 239.4 G | Refills: 0 | Status: SHIPPED | OUTPATIENT
Start: 2019-07-29

## 2019-10-10 DIAGNOSIS — S72.001A CLOSED FRACTURE OF RIGHT HIP, INITIAL ENCOUNTER (HCC): ICD-10-CM

## 2019-10-11 RX ORDER — ALENDRONATE SODIUM 70 MG/1
TABLET ORAL
Qty: 1 TABLET | Refills: 0 | Status: SHIPPED | OUTPATIENT
Start: 2019-10-11 | End: 2019-11-07 | Stop reason: SDUPTHER

## 2019-11-07 DIAGNOSIS — S72.001A CLOSED FRACTURE OF RIGHT HIP, INITIAL ENCOUNTER (HCC): ICD-10-CM

## 2019-11-12 RX ORDER — ALENDRONATE SODIUM 70 MG/1
TABLET ORAL
Qty: 1 TABLET | Refills: 0 | Status: SHIPPED | OUTPATIENT
Start: 2019-11-12 | End: 2020-01-06 | Stop reason: SDUPTHER

## 2019-11-22 DIAGNOSIS — K59.04 CHRONIC IDIOPATHIC CONSTIPATION: Primary | ICD-10-CM

## 2019-11-27 DIAGNOSIS — F32.9 REACTIVE DEPRESSION: ICD-10-CM

## 2019-11-27 DIAGNOSIS — I10 ACCELERATED HYPERTENSION: ICD-10-CM

## 2019-11-27 DIAGNOSIS — E53.8 B12 DEFICIENCY: ICD-10-CM

## 2019-11-27 DIAGNOSIS — Z48.89 AFTERCARE FOLLOWING SURGERY FOR INJURY OR TRAUMA: ICD-10-CM

## 2019-11-27 DIAGNOSIS — I63.9 CVA (CEREBRAL VASCULAR ACCIDENT) (HCC): ICD-10-CM

## 2019-11-27 DIAGNOSIS — K59.00 CONSTIPATION, UNSPECIFIED CONSTIPATION TYPE: Primary | ICD-10-CM

## 2019-11-27 DIAGNOSIS — D35.2 PITUITARY ADENOMA (HCC): ICD-10-CM

## 2019-11-27 RX ORDER — BISACODYL 5 MG
TABLET, DELAYED RELEASE (ENTERIC COATED) ORAL
Qty: 60 TABLET | Refills: 0 | Status: SHIPPED | OUTPATIENT
Start: 2019-11-27

## 2019-11-29 RX ORDER — ASPIRIN 81 MG/1
TABLET, CHEWABLE ORAL
Qty: 30 TABLET | Refills: 0 | Status: SHIPPED | OUTPATIENT
Start: 2019-11-29

## 2019-11-29 RX ORDER — ATORVASTATIN CALCIUM 40 MG/1
TABLET, FILM COATED ORAL
Qty: 30 TABLET | Refills: 0 | Status: SHIPPED | OUTPATIENT
Start: 2019-11-29

## 2019-11-29 RX ORDER — DOCUSATE SODIUM 100 MG/1
CAPSULE ORAL
Qty: 30 CAPSULE | Refills: 0 | Status: SHIPPED | OUTPATIENT
Start: 2019-11-29

## 2019-11-29 RX ORDER — CITALOPRAM 10 MG/1
TABLET ORAL
Qty: 15 TABLET | Refills: 0 | Status: SHIPPED | OUTPATIENT
Start: 2019-11-29

## 2019-11-29 RX ORDER — LANOLIN ALCOHOL/MO/W.PET/CERES
CREAM (GRAM) TOPICAL
Qty: 30 TABLET | Refills: 0 | Status: SHIPPED | OUTPATIENT
Start: 2019-11-29

## 2019-11-29 RX ORDER — BROMOCRIPTINE MESYLATE 2.5 MG/1
TABLET ORAL
Qty: 8 TABLET | Refills: 0 | Status: SHIPPED | OUTPATIENT
Start: 2019-11-29

## 2019-11-29 RX ORDER — AMLODIPINE BESYLATE 10 MG/1
TABLET ORAL
Qty: 30 TABLET | Refills: 0 | Status: SHIPPED | OUTPATIENT
Start: 2019-11-29

## 2019-11-29 RX ORDER — DOCUSATE SODIUM AND SENNOSIDES 8.6; 5 MG/1; MG/1
TABLET, FILM COATED ORAL
Qty: 60 TABLET | Refills: 0 | Status: SHIPPED | OUTPATIENT
Start: 2019-11-29

## 2020-01-06 DIAGNOSIS — S72.001A CLOSED FRACTURE OF RIGHT HIP, INITIAL ENCOUNTER (HCC): ICD-10-CM

## 2020-01-06 RX ORDER — ALENDRONATE SODIUM 70 MG/1
TABLET ORAL
Qty: 12 TABLET | Refills: 3 | Status: SHIPPED | OUTPATIENT
Start: 2020-01-06

## 2020-06-26 NOTE — OCCUPATIONAL THERAPY NOTE
[address] Evaluation & Treatment Note     Patient Name: Uziel MITCHELL Date: 7/23/2018  Problem List  Patient Active Problem List   Diagnosis    Abnormal gait    Abnormal weight gain    Abnormal weight loss    Acromegalia ([facility] )    Alopecia    Alzheimers disease    Arthritis    Bilateral impacted cerumen    Depression    Fatigue    Hypercholesterolemia    Hypertension    Osteopenia    Sleep disorder    Type 2 diabetes mellitus, with long-term current use of insulin (HCC)    Vitamin D deficiency    Candidal UTI (urinary tract infection)    RAZIA (obstructive sleep apnea)    Acute right-sided weakness    Accelerated hypertension    S/P ORIF (open reduction internal fixation) fracture    Anemia    Shortness of breath    Acute deep vein thrombosis (DVT) of right lower extremity (HCC)    Constipation     Past Medical History  Past Medical History:   Diagnosis Date    Ankle fracture     Dementia     Diabetes ([facility] )     Hypertension     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[facility] )     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HCC)      Past Surgical History  Past Surgical History:   Procedure Laterality Date    CARPAL TUNNEL RELEASE      LA OPEN RX FEMUR FX+INTRAMED RADHA Right 7/18/2018    Procedure: INSERTION NAIL IM FEMUR ANTEGRADE (TROCHANTERIC);   Surgeon: Em De Los Santos MD;  Location: [facility];  Service: Orthopedics         07/23/18 [facility]   Note Type   Note type Eval/Treat   Restrictions/Precautions   RLE Weight Bearing Per Order WBAT   Other Precautions Fall Risk;Pain;Cognitive;O2;Visual impairment  (R side weakness; L hand IV)   Pain Assessment   Pain Assessment No/denies pain   Pain Score No Pain   Home Living   Type of Home Assisted living  [facility])   Home Layout One level   Bathroom Shower/Tub Walk-in shower   Bathroom Toilet Raised   Bathroom Accessibility [address] bed   Prior Function   Level of Rankin Needs assistance with ADLs and functional After conferring with on-call DANIEL Torres, SEVERINON returned patient's call. Patient's symptoms sound like a possible migraine. Patient did say the symptoms are improving and it's become more of a dull headache. LPN advised resting in a quiet darkened room, ice packs, and OTC medication such as Excedrin Migraine. Patient states she just took an Excedrin. LPN is advised that there are medications that can be prescribed in the Urgent Care if patient's symptoms become unbearable. Patient can also reach out to her PCP on Monday to discuss PRN migraine prescription medications as patient suspects she was having a migraine about 2 months ago as well.    Patient would like a call back from Dr. Lee's team on Monday, June 29 to discuss a possible follow-up exam for the concussion as patient is leaving for boot camp on 7/21.   mobility   Lives With Other (Comment)  ([facility])   Angel Help From Family  (Staff )   ADL Assistance Needs assistance   IADLs Needs assistance   Falls in the last 6 months 1 to 4   Lifestyle   Autonomy Pt receives assist from family & staff for ADLs  Ambulates using RW to/from dining room for meals  Facility assists with home making tasks  Reciprocal Relationships Family   Intrinsic Gratification None stated  Per reports primiarliy sedentary  Subjective   Subjective "I have to go to the bathroom"   Bed Mobility   Rolling R 1  Dependent   Additional items Assist x 2   Rolling L 1  Dependent   Additional items Assist x 2   Sit to Supine 1  Dependent   Transfers   Sit to Stand 3  Moderate assistance   Additional items Assist x 2  (3 trials)   Stand to Sit 3  Moderate assistance   Additional items Assist x 2   Stand pivot Unable to assess  (Attempted x 3 but unable  )   Functional Mobility   Functional Mobility (Unable  )   Additional Comments Fearful of all movements   Balance   Static Sitting Fair  (R side lean )   Dynamic Sitting Poor +   Static Standing Poor   Activity Tolerance   Activity Tolerance Patient limited by pain;Treatment limited secondary to medical complications (Comment); Patient limited by fatigue   Nurse Made Aware Jose Watts- unable to txf or inconsistent with need to use bathroom   RUE Assessment   [facility] Assessment ([facility] 4-/5; 4-/5)   LUE Assessment   LUE Assessment WNL  (4/5; 4/5; 4/5 )   Hand Function   Gross Motor Coordination Impaired  (R side)   Fine Motor Coordination Impaired  (R side )   Sensation   Light Touch No apparent deficits   Vision-Basic Assessment   Current Vision Wears glasses all the time   Cognition   Overall Cognitive Status Impaired   Attention Attends with cues to redirect   Orientation Level Oriented to person;Oriented to place   Memory Decreased short term memory;Decreased recall of recent events   Following Commands Follows one step commands inconsistently   Assessment   Limitation Decreased ADL status; Decreased UE ROM; Decreased UE strength;Decreased cognition;Decreased endurance;Decreased self-care trans;Decreased high-level ADLs   Prognosis Fair   Assessment [facility] (there for rehab s/p ORIF R hip) with R sided weakness and R sided facial droop  Dx: Subacute L posterior putamen infarction  OT completed extensive review of pt's medical and social history  Pt with h/o dementia, DM, HTN, MEN 1, Pituitary abnormality, Hypercalcemia, ankle fx, and recent ORIF R hip s/p fall  Prior to admit was living at [facility] and required assist with ADLs and facility provided assist with IADLS  However, was able to ambulate using RW to/from dining room for meals  Pt presents to OT below baseline due to the following performance deficits: ROM; strength; pain; balance; stand tolerance; functional mobility; problem solving; awareness; coping; and self care  Therefore, pt would benefit from OT services to achieve optimal level of performance  Occupational performance areas to be addressed include: grooming, bathing, toileting, dressing, activity tolerance, functional mobility, and clothing management  Based on findings, pt is of high complexity, due to being total assist with all tasks and unable to transfer at this time  Recommend SNF placement for rehab  High-complexity eval x 25 mins+ 10 mins tx  Goals   Patient Goals "Not expressed"   STG Time Frame 3-5   Short Term Goal #1 1  UB ADL- Min; 2  LB ADL- Mod using AE; 3  Bed Mobility- (S); 4  ADL Txfs- Mod x 1 using RW; 5  Stand Tolerance- 2-3 mins for hygiene; 6  Activity Tolerance- Fair- ; 7  Stand Balance- P+ static stand & P dynamic; 8  Pt will increase R UE strength to 4/5; 9  Grooming- Min    Plan   Treatment Interventions ADL retraining;Functional transfer training;UE strengthening/ROM; Activityengagement; Neuromuscular reeducation; Compensatory technique education;Equipment evaluation/education;Patient/family training   Goal Expiration Date 08/02/18   Treatment Day 1   OT Frequency 3-5x/wk   Additional Treatment Session   Start Time 1410   End Time 1420   Treatment Assessment Treatment s/p evaluation focused on ADL transfers and activity tolerance  Pt sitting on EOB with R side lean; F balance  Requested to use the bathroom  Sit to stand from EOB x 3 trials with Mod x 2  However, unable to transfer to [facility] to use the bathroom  Attempted using RW, arm held assist x 2, and anterior approach  Stand to sit Mod x 2 for all trials  Pt is fearful of any movement  In addition, + grimace and yells out in pain  When asked about pain pt states no  Instructed pt that she will have to use bedpan and she states no  Then reports she doesn't have to go  Returned to bed (D) x 2  Roll both directions (D) x 2 to fix pad  Remains in bed with all needs and D-I-L present  Notified RN of the possible need to use bathroom with recommendation to use bedpan in a few minutes  P activity tolerance      Recommendation   OT Discharge Recommendation Other (Comment)  (SNF placement for extended rehab  )   Barthel Index   Feeding 5   Bathing 0   Grooming Score 0   Dressing Score 0   Bladder Score 0   Bowels Score 5   Toilet Use Score 0   Transfers (Bed/Chair) Score 5   Mobility (Level Surface) Score 0   Stairs Score 0   Barthel Index Score 15       [place], [place]

## 2021-04-02 ENCOUNTER — TELEPHONE (OUTPATIENT)
Dept: GERIATRICS | Facility: CLINIC | Age: 86
End: 2021-04-02

## 2021-04-16 ENCOUNTER — APPOINTMENT (EMERGENCY)
Dept: RADIOLOGY | Facility: HOSPITAL | Age: 86
End: 2021-04-16
Payer: MEDICARE

## 2021-04-16 ENCOUNTER — HOSPITAL ENCOUNTER (EMERGENCY)
Facility: HOSPITAL | Age: 86
Discharge: HOME | End: 2021-04-16
Attending: EMERGENCY MEDICINE | Admitting: EMERGENCY MEDICINE
Payer: MEDICARE

## 2021-04-16 ENCOUNTER — APPOINTMENT (EMERGENCY)
Dept: CT IMAGING | Facility: HOSPITAL | Age: 86
End: 2021-04-16
Payer: MEDICARE

## 2021-04-16 VITALS
SYSTOLIC BLOOD PRESSURE: 178 MMHG | HEART RATE: 62 BPM | BODY MASS INDEX: 30.93 KG/M2 | TEMPERATURE: 98 F | HEIGHT: 59 IN | RESPIRATION RATE: 18 BRPM | OXYGEN SATURATION: 97 % | WEIGHT: 153.44 LBS | DIASTOLIC BLOOD PRESSURE: 77 MMHG
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
## 2021-04-16 DIAGNOSIS — S70.00XA CONTUSION OF HIP: ICD-10-CM

## 2021-04-16 LAB
ANION GAP SERPL CALCULATED.3IONS-SCNC: 8 MMOL/L (ref 4–13)
BASOPHILS # BLD AUTO: 0.05 THOUSANDS/ΜL (ref 0–0.1)
BASOPHILS NFR BLD AUTO: 1 % (ref 0–1)
BUN SERPL-MCNC: 9 MG/DL (ref 5–25)
CALCIUM SERPL-MCNC: 10 MG/DL (ref 8.3–10.1)
CHLORIDE SERPL-SCNC: 110 MMOL/L (ref 100–108)
CO2 SERPL-SCNC: 25 MMOL/L (ref 21–32)
CREAT SERPL-MCNC: 0.57 MG/DL (ref 0.6–1.3)
EOSINOPHIL # BLD AUTO: 0.44 THOUSAND/ΜL (ref 0–0.61)
EOSINOPHIL NFR BLD AUTO: 5 % (ref 0–6)
ERYTHROCYTE [DISTWIDTH] IN BLOOD BY AUTOMATED COUNT: 15 % (ref 11.6–15.1)
GFR SERPL CREATININE-BSD FRML MDRD: 82 ML/MIN/1.73SQ M
GLUCOSE SERPL-MCNC: 108 MG/DL (ref 65–140)
HCT VFR BLD AUTO: 37.9 % (ref 34.8–46.1)
HGB BLD-MCNC: 12.1 G/DL (ref 11.5–15.4)
IMM GRANULOCYTES # BLD AUTO: 0.03 THOUSAND/UL (ref 0–0.2)
IMM GRANULOCYTES NFR BLD AUTO: 0 % (ref 0–2)
LYMPHOCYTES # BLD AUTO: 2.42 THOUSANDS/ΜL (ref 0.6–4.47)
LYMPHOCYTES NFR BLD AUTO: 28 % (ref 14–44)
MCH RBC QN AUTO: 27.8 PG (ref 26.8–34.3)
MCHC RBC AUTO-ENTMCNC: 31.9 G/DL (ref 31.4–37.4)
MCV RBC AUTO: 87 FL (ref 82–98)
MONOCYTES # BLD AUTO: 0.63 THOUSAND/ΜL (ref 0.17–1.22)
MONOCYTES NFR BLD AUTO: 7 % (ref 4–12)
NEUTROPHILS # BLD AUTO: 5.12 THOUSANDS/ΜL (ref 1.85–7.62)
NEUTS SEG NFR BLD AUTO: 59 % (ref 43–75)
NRBC BLD AUTO-RTO: 0 /100 WBCS
PLATELET # BLD AUTO: 141 THOUSANDS/UL (ref 149–390)
PMV BLD AUTO: 9.6 FL (ref 8.9–12.7)
POTASSIUM SERPL-SCNC: 4 MMOL/L (ref 3.5–5.3)
RBC # BLD AUTO: 4.35 MILLION/UL (ref 3.81–5.12)
SODIUM SERPL-SCNC: 143 MMOL/L (ref 136–145)
WBC # BLD AUTO: 8.69 THOUSAND/UL (ref 4.31–10.16)

## 2021-04-16 PROCEDURE — 72125 CT NECK SPINE W/O DYE: CPT

## 2021-04-16 PROCEDURE — 80048 BASIC METABOLIC PNL TOTAL CA: CPT | Performed by: EMERGENCY MEDICINE

## 2021-04-16 PROCEDURE — 99284 EMERGENCY DEPT VISIT MOD MDM: CPT | Performed by: EMERGENCY MEDICINE

## 2021-04-16 PROCEDURE — 36415 COLL VENOUS BLD VENIPUNCTURE: CPT | Performed by: EMERGENCY MEDICINE

## 2021-04-16 PROCEDURE — 70450 CT HEAD/BRAIN W/O DYE: CPT

## 2021-04-16 PROCEDURE — 99284 EMERGENCY DEPT VISIT MOD MDM: CPT

## 2021-04-16 PROCEDURE — 85025 COMPLETE CBC W/AUTO DIFF WBC: CPT | Performed by: EMERGENCY MEDICINE

## 2021-04-16 PROCEDURE — 73502 X-RAY EXAM HIP UNI 2-3 VIEWS: CPT

## 2021-04-16 NOTE — ED NOTES
Patient soiled in urine, cleaned up with assist of isidro Villegas, small amount of redness noted to sacral area, patient repositioned, side rails up as appropriate, call bell within reach, bed low position     Raissa Castillo RN  04/16/21 0752

## 2021-04-16 NOTE — ED NOTES
Spoke with Xiomara Winters from [facility] transport, informed patient has contract with [facility], to double check and arrange transport to [place] [address] , [address] [facility]  04/16/21 9511

## 2021-04-16 NOTE — ED PROVIDER NOTES
Emergency Department Trauma Note  Constantine Willis 80 y o  female MRN: [id number]  Unit/Bed#: ED 11/ED 11 Encounter: [id number]      Trauma Alert: Trauma Acuity: C  Model of Arrival: Mode of Arrival: BLS via    Trauma Team: Current Providers  Attending Provider: Rajan Moody MD  Registered Nurse: Marissa Milner RN  Consultants: None      History of Present Illness     Chief Complaint:   Chief Complaint   Patient presents with   Liu Wagner     Arrives via EMS for a reported fall yesterday from a shower chair  R hip pain, EMS reports no apparent rotation or deformity  Takes daily aspirin  HPI:  Constantine Willis is a 80 y o  female who presents with a ground level fall  No loss consciousness  She fell from a shower chair  She takes a daily aspirin  No reported head  No vomiting  There was reported bruising to the right hip but this was simply chronic skin discoloration  Some mild tenderness to the hip  Patient well-appearing, no acute distress  Trauma Secondary exam performed (must be performed and documented on all traumas): GCS 14, full ROM of bilateral upper and lower extremities  Airway intact, bilateral breath sounds, palpable pulses  No active bleeding  No bony point tenderness in extremities, chest, abdomen or c/t,l spine  No crepitus, abdomen soft/non tender  Chest wall soft non tender with no deformities  Pelvis stable  Document C-spine clearance after CT c-spine came back normal - cspineclearsmartlist: Of note, C-spine clear after CT scans resulted  Medical decision making:  Jose 80-year-old female, will discharge, no traumatic abnormalities found  Mechanism:Details of Incident: Fall from shower chair Injury Date: 04/15/21        HPI  Review of Systems   Musculoskeletal:        Reported mild right hip pain   All other systems reviewed and are negative        Historical Information     Immunizations:   Immunization History   Administered Date(s) Administered    Influenza Split High Dose Preservative Free IM 2017       Past Medical History:   Diagnosis Date    Ankle fracture     Arthritis     Dementia     Diabetes ([facility] )     Hypertension     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[facility] )     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HCC)     Stroke ([facility] )      No family history on file  Past Surgical History:   Procedure Laterality Date    CARPAL TUNNEL RELEASE      FRACTURE SURGERY      SD OPEN RX FEMUR FX+INTRAMED RADHA Right 2018    Procedure: INSERTION NAIL IM FEMUR ANTEGRADE (TROCHANTERIC); Surgeon: Tricia Harper MD;  Location: [facility];  Service: Orthopedics     Social History     Tobacco Use    Smoking status: Former Smoker     Packs/day: 1 00     Types: Cigarettes     Quit date: 1979     Years since quittin 7    Smokeless tobacco: Never Used   Substance Use Topics    Alcohol use: No    Drug use: No     E-Cigarette/Vaping     E-Cigarette/Vaping Substances       Family History: non-contributory    Meds/Allergies   Prior to Admission Medications   Prescriptions Last Dose Informant Patient Reported? Taking?    Alcohol Swabs (ALCOHOL PREP) 70 % PADS  Outside Facility (Specify) No No   Sig: TEST BLOOD SUGAR ONCE DAILY   BISACODYL 5 MG EC tablet   No No   Sig: TAKE 2 TABLETS (10MG) BY MOUTH ONCE DAILY AS NEEDED FOR CONSTIPATION   DOCQLACE 100 MG capsule   No No   Sig: TAKE 1 CAPSULE BY MOUTH ONCE DAILY   DOK PLUS 50-8 6 MG per tablet   No No   Sig: TAKE 2 TABLETS BY MOUTH ONCE DAILY (HOLD FOR LOOSE STOOLS) -DRINKPLENTY OF WATER   GLUCAGEN HYPOKIT 1 MG injection  Outside Facility (Specify) No No   Sig: ADMINISTER IF GLUCOSE <70 @ 15MINS AFTER GLUCOSE GEL ADMINISTERED   Patient not taking: Reported on 7/10/2019   NOVOFINE AUTOCOVER 30G X 8 MM MISC  Outside Facility (Specify) Yes No   acetaminophen (TYLENOL) 325 mg tablet  Outside Facility (Specify) No No   Si pills three times per day (with meals )   alendronate (FOSAMAX) 70 mg tablet   No No   Sig: TAKE 1 TABLET WEEKLY (Wednesday)- DRINK PLENTY OF WATER -DO NOT LIE DOWN FOR 30 MINS AFTER TAKING   amLODIPine (NORVASC) 10 mg tablet   No No   Sig: TAKE 1 TABLET BY MOUTH ONCE DAILY (HOLD FOR SBP<100 OR HR<60)   aspirin 81 mg chewable tablet   No No   Sig: CHEW 1 TABLET BY MOUTH ONCE DAILY   atorvastatin (LIPITOR) 40 mg tablet   No No   Sig: TAKE 1 TABLET BY MOUTH IN THE EVENING   bisacodyl (DULCOLAX) 10 mg suppository  Outside Facility (Specify) No No   Sig: Insert 1 suppository (10 mg total) into the rectum daily as needed for constipation   bromocriptine (PARLODEL) 2 5 mg tablet   No No   Sig: TAKE 14 TABLET (0 625MG) BY MOUTH AT BEDTIME   cholestyramine light (PREVALITE) 4 GM/DOSE powder   No No   Sig: MIX 1 SCOOP WITH 4-8OZ LIQ & ADMIN AS NEEDED (AT LEAST 2HRS FROM MEDS)   citalopram (CeleXA) 10 mg tablet   No No   Sig: TAKE 1/2 TABLET (5MG) BY MOUTH ONCE DAILY -MAY CAUSE DROWSINESS -NOALCOHOL WHILE ON THIS!   insulin degludec (TRESIBA FLEXTOUCH) 100 units/mL injection pen  Outside Facility (Specify) Yes No   Sig: Inject under the skin   vitamin B-12 (VITAMIN B-12) 1,000 mcg tablet   No No   Sig: TAKE 1 TABLET BY MOUTH ONCE DAILY      Facility-Administered Medications: None       Allergies   Allergen Reactions    Amoxicillin     Ibuprofen        PHYSICAL EXAM         Objective   Vitals:   First set: Temperature: 98 °F (36 7 °C) (04/16/21 0336)  Pulse: 74 (04/16/21 0330)  Respirations: 20 (04/16/21 0330)  Blood Pressure: (!) 195/87 (04/16/21 0330)  SpO2: 98 % (04/16/21 0330)      Primary Survey:   (A) Airway: intact  (B) Breathing: +breath sounds bilaterally  (C) Circulation: Pulses:   normal  (D) Disabliity:  GCS Total:  14 - baseline  (E) Expose:  Completed      Secondary Survey: (Click on Physical Exam tab above)  Physical Exam  Vitals signs and nursing note reviewed  Constitutional:       Appearance: She is well-developed  HENT:      Head: Normocephalic and atraumatic        Right Ear: External ear normal       Left Ear: External ear normal    Eyes:      Conjunctiva/sclera: Conjunctivae normal    Neck:      Musculoskeletal: Normal range of motion and neck supple  Vascular: No JVD  Trachea: No tracheal deviation  Cardiovascular:      Rate and Rhythm: Normal rate and regular rhythm  Heart sounds: Normal heart sounds  Pulmonary:      Effort: Pulmonary effort is normal  No respiratory distress  Breath sounds: No wheezing or rales  Abdominal:      Palpations: Abdomen is soft  Tenderness: There is no abdominal tenderness  There is no guarding or rebound  Musculoskeletal:         General: No tenderness  Comments: Mild right hip tenderness, do not suspect occult fracture   Skin:     General: Skin is warm and dry  Findings: No erythema or rash  Neurological:      General: No focal deficit present  Mental Status: She is alert and oriented to person, place, and time  Motor: No weakness  Psychiatric:         Behavior: Behavior normal          Thought Content: Thought content normal          Cervical spine cleared by clinical criteria?  No (imaging required)      Invasive Devices     Peripherally Inserted Central Catheter Line            PICC Line 08/09/18 980 days          Peripheral Intravenous Line            Long-Dwell Peripheral IV (Midline) 08/09/18 Left Brachial 980 days    Peripheral IV 04/16/21 Left Hand less than 1 day                Lab Results:   Results Reviewed     Procedure Component Value Units Date/Time    Basic metabolic panel [[id number]]  (Abnormal) Collected: 04/16/21 0435    Lab Status: Final result Specimen: Blood from Arm, Right Updated: 04/16/21 0455     Sodium 143 mmol/L      Potassium 4 0 mmol/L      Chloride 110 mmol/L      CO2 25 mmol/L      ANION GAP 8 mmol/L      BUN 9 mg/dL      Creatinine 0 57 mg/dL      Glucose 108 mg/dL      Calcium 10 0 mg/dL      eGFR 82 ml/min/1 73sq m     Narrative:      National Kidney Disease Foundation guidelines for Chronic Kidney Disease (CKD):     Stage 1 with normal or high GFR (GFR > 90 mL/min/1 73 square meters)    Stage 2 Mild CKD (GFR = 60-89 mL/min/1 73 square meters)    Stage 3A Moderate CKD (GFR = 45-59 mL/min/1 73 square meters)    Stage 3B Moderate CKD (GFR = 30-44 mL/min/1 73 square meters)    Stage 4 Severe CKD (GFR = 15-29 mL/min/1 73 square meters)    Stage 5 End Stage CKD (GFR <15 mL/min/1 73 square meters)  Note: GFR calculation is accurate only with a steady state creatinine    CBC and differential [[id number]]  (Abnormal) Collected: 04/16/21 0435    Lab Status: Final result Specimen: Blood from Arm, Right Updated: 04/16/21 0446     WBC 8 69 Thousand/uL      RBC 4 35 Million/uL      Hemoglobin 12 1 g/dL      Hematocrit 37 9 %      MCV 87 fL      MCH 27 8 pg      MCHC 31 9 g/dL      RDW 15 0 %      MPV 9 6 fL      Platelets 797 Thousands/uL      nRBC 0 /100 WBCs      Neutrophils Relative 59 %      Immat GRANS % 0 %      Lymphocytes Relative 28 %      Monocytes Relative 7 %      Eosinophils Relative 5 %      Basophils Relative 1 %      Neutrophils Absolute 5 12 Thousands/µL      Immature Grans Absolute 0 03 Thousand/uL      Lymphocytes Absolute 2 42 Thousands/µL      Monocytes Absolute 0 63 Thousand/µL      Eosinophils Absolute 0 44 Thousand/µL      Basophils Absolute 0 05 Thousands/µL                  Imaging Studies:   Direct to CT: no  TRAUMA - CT head wo contrast   Final Result by Lavelle Banuelos MD (04/16 0425)      No acute intracranial abnormality  Mild to moderate microangiopathic changes are similar to the prior study  There is a small old infarction on the left  There is some fluid and frothy appearing secretions in the left sphenoid sinus  Acute sinusitis should be excluded clinically  The study was marked in [facility] for immediate notification                    Workstation performed: [id number]         TRAUMA - CT spine cervical wo contrast   Final Result by Lavelle Banuelos MD (04/16 1707)      No acute cervical spine fracture or traumatic malalignment  The osseous structures appear osteopenic  Multilevel degenerative changes are present  The study was marked in [facility] for immediate notification  Workstation performed: [id number]         XR hip/pelv 2-3 vws right if performed    (Results Pending)         Procedures  Procedures         ED Course  ED Course as of Apr 16 0546   Fri Apr 16, 2021   0431 Make sure Kajal Correa is the transport team               MDM        Disposition  [facility] Transfer: No  Final diagnoses:   Fall   Contusion of hip     Time reflects when diagnosis was documented in both MDM as applicable and the Disposition within this note     Time User Action Codes Description Comment    4/16/2021  5:41 AM Maryse [address] [Z05  XXXA] Fall     4/16/2021  5:41 AM Oscar Serra Add [S70 00XA] Contusion of hip       ED Disposition     ED Disposition Condition Date/Time Comment    Discharge Stable Fri Apr 16, 2021  5:41 AM Curtis Padgett discharge to home/self care  Follow-up Information     Follow up With Specialties Details Why Contact Info    Alpa Ray MD Geriatric Medicine In 1 day  [address]  [phone]          Patient's Medications   Discharge Prescriptions    No medications on file     No discharge procedures on file      PDMP Review     None          ED Provider  Electronically Signed by         Cristóbal Joaquin MD  04/16/21 2807

## 2021-04-16 NOTE — ED NOTES
Spoke with Richard Whites from [facility], josephine Sadie Joseph was contacted for transport, [facility] to  patient around 0945       Kathrine Jauregui RN  04/16/21 9029

## 2021-04-16 NOTE — ED NOTES
Family at bedside aware [facility] called [facility] regarding transport to nursing facility   Advised that [facility] will  patient at 0945 to go to nursing facility     Alvarez De RN  04/16/21 4151

## 2021-04-16 NOTE — ED NOTES
Attempted to call [address] [place] [address] at [phone], no answer     Marissa Milner RN  04/16/21 0848

## 2021-05-11 ENCOUNTER — TELEPHONE (OUTPATIENT)
Dept: GERIATRICS | Age: 86
End: 2021-05-11

## 2021-05-11 NOTE — TELEPHONE ENCOUNTER
Ashok Jaffe from [facility] called to get a updated physcian order for the patients c- pap machine and othe requipment

## 2021-05-11 NOTE — TELEPHONE ENCOUNTER
Spoke with Rika  She looked up the patient and stated she did not see a phone call note from [facility] about a new physician order and the patient is not eligible for a new machine until June 2023  I informed Rika the patient is no longer under our physician's care

## 2021-06-10 RX ORDER — SYRINGE-NEEDLE,INSULIN,0.5 ML 28GX1/2"
SYRINGE, EMPTY DISPOSABLE MISCELLANEOUS
Qty: 100 EACH | Refills: 0 | OUTPATIENT
Start: 2021-06-10

## 2021-09-06 DIAGNOSIS — E13.9 DIABETES 1.5, MANAGED AS TYPE 2 (HCC): Primary | ICD-10-CM

## 2021-09-07 RX ORDER — SYRINGE-NEEDLE,INSULIN,0.5 ML 28GX1/2"
SYRINGE, EMPTY DISPOSABLE MISCELLANEOUS
Qty: 100 EACH | Refills: 0 | Status: SHIPPED | OUTPATIENT
Start: 2021-09-07

## 2022-01-02 DIAGNOSIS — E13.9 DIABETES 1.5, MANAGED AS TYPE 2 (HCC): Primary | ICD-10-CM

## 2022-01-02 RX ORDER — INSULIN DEGLUDEC INJECTION 100 U/ML
INJECTION, SOLUTION SUBCUTANEOUS
Qty: 3 ML | Refills: 0 | Status: SHIPPED | OUTPATIENT
Start: 2022-01-02 | End: 2022-05-23

## 2022-01-18 NOTE — NURSING NOTE
Dr Lacie Cartwright made aware of the result of calcium level from 11 3 yesterday to 11 2 today  She said she'll put the order for an IV fluid  Will continue to monitor the patient  no

## 2022-03-22 ENCOUNTER — TELEPHONE (OUTPATIENT)
Dept: SLEEP CENTER | Facility: CLINIC | Age: 87
End: 2022-03-22

## 2022-03-22 NOTE — TELEPHONE ENCOUNTER
Returned call from patient's daughter Azra Serrano  States her mother was evaluated for sleep apnea years ago and has been using CPAP since  She was not able to follow up as she had a broken hip shortly afterwards and then a stroke  She is currently in a care home and has Alzheimers  Her AHIs have been running in the 50's  Daughter feels she needs to be reevaluated but patient is immobile and it is very difficult to transport her  Patient's geriatric physician is asking if Dr Danielle Ayers can call him to discuss patient  Dr Elvin Swann  Phone# [phone]  Patient last seen 6/12/2018  Dr Danielle Ayers, would you reach out to patient's PCP?

## 2022-05-22 DIAGNOSIS — E13.9 DIABETES 1.5, MANAGED AS TYPE 2 (HCC): ICD-10-CM

## 2022-05-23 RX ORDER — INSULIN DEGLUDEC INJECTION 100 U/ML
INJECTION, SOLUTION SUBCUTANEOUS
Qty: 3 ML | Refills: 0 | Status: SHIPPED | OUTPATIENT
Start: 2022-05-23

## 2022-07-15 DIAGNOSIS — E13.9 DIABETES 1.5, MANAGED AS TYPE 2 (HCC): Primary | ICD-10-CM

## 2022-07-15 RX ORDER — NICOTINE POLACRILEX 4 MG
15 LOZENGE BUCCAL AS NEEDED
Qty: 15 G | Refills: 0 | Status: SHIPPED | OUTPATIENT
Start: 2022-07-15

## 2022-09-19 DIAGNOSIS — E13.9 DIABETES 1.5, MANAGED AS TYPE 2 (HCC): Primary | ICD-10-CM

## 2022-09-19 RX ORDER — INSULIN ADMIN. SUPPLIES
INSULIN PEN (EA) SUBCUTANEOUS
Qty: 100 EACH | Refills: 0 | Status: SHIPPED | OUTPATIENT
Start: 2022-09-19

## 2022-11-23 DIAGNOSIS — S72.001A CLOSED FRACTURE OF RIGHT HIP, INITIAL ENCOUNTER (HCC): ICD-10-CM

## 2022-11-23 RX ORDER — ALENDRONATE SODIUM 70 MG/1
TABLET ORAL
Qty: 4 TABLET | Refills: 0 | Status: SHIPPED | OUTPATIENT
Start: 2022-11-23

## 2022-12-07 NOTE — PLAN OF CARE
Pharmacist Admission Medication Reconciliation Pending Note    Prior to Admission Medications were reviewed by the pharmacist and pended for provider review during admission medication reconciliation.    Medications were pended by the pharmacist at this time as follows:    Pended Admission Order Reconciliation Actions - Kenia Rodrigues PHARMD 1/4/2021  8:32 PM Jardiance : non formulary , pt may bring own supply  Metformin: contrast used , not ordered  Fenofibrate : last dose past week, as pt out of refill, Md to resume if appropriate .    Order Name Action Reordered As    atorvastatin (LIPITOR) 40 MG tablet Order for Admission atorvastatin (LIPITOR) tablet 40 mg            Orders Pended To Continue For Hospital Stay     ID Description Pended By When Reason    [id number] atorvastatin (LIPITOR) tablet 40 mg-DAILY Kenia Rodrigues PHARMD 01/04/21 2032             Pharmacist Notations:           Orders that are ultimately reconciled and signed during admission medication reconciliation may differ from the pended actions above.    Please contact the pharmacist for questions.    Kenia Rodrigues PHARMD  1/4/2021 8:32 PM   Problem: Potential for Falls  Goal: Patient will remain free of falls  INTERVENTIONS:  - Assess patient frequently for physical needs  -  Identify cognitive and physical deficits and behaviors that affect risk of falls    -  [place] fall precautions as indicated by assessment   - Educate patient/family on patient safety including physical limitations  - Instruct patient to call for assistance with activity based on assessment  - Modify environment to reduce risk of injury  - Consider OT/PT consult to assist with strengthening/mobility   Outcome: Progressing      Problem: Prexisting or High Potential for Compromised Skin Integrity  Goal: Skin integrity is maintained or improved  INTERVENTIONS:  - Identify patients at risk for skin breakdown  - Assess and monitor skin integrity  - Assess and monitor nutrition and hydration status  - Monitor labs (i e  albumin)  - Assess for incontinence   - Turn and reposition patient  - Assist with mobility/ambulation  - Relieve pressure over bony prominences  - Avoid friction and shearing  - Provide appropriate hygiene as needed including keeping skin clean and dry  - Evaluate need for skin moisturizer/barrier cream  - Collaborate with interdisciplinary team (i e  Nutrition, Rehabilitation, etc )   - Patient/family teaching   Outcome: Progressing      Problem: PAIN - ADULT  Goal: Verbalizes/displays adequate comfort level or baseline comfort level  Interventions:  - Encourage patient to monitor pain and request assistance  - Assess pain using appropriate pain scale  - Administer analgesics based on type and severity of pain and evaluate response  - Implement non-pharmacological measures as appropriate and evaluate response  - Consider cultural and social influences on pain and pain management  - Notify physician/advanced practitioner if interventions unsuccessful or patient reports new pain   Outcome: Progressing      Problem: SAFETY ADULT  Goal: Maintain or return to baseline ADL function  INTERVENTIONS:  -  Assess patient's ability to carry out ADLs; assess patient's baseline for ADL function and identify physical deficits which impact ability to perform ADLs (bathing, care of mouth/teeth, toileting, grooming, dressing, etc )  - Assess/evaluate cause of self-care deficits   - Assess range of motion  - Assess patient's mobility; develop plan if impaired  - Assess patient's need for assistive devices and provide as appropriate  - Encourage maximum independence but intervene and supervise when necessary  ¯ Involve family in performance of ADLs  ¯ Assess for home care needs following discharge   ¯ Request OT consult to assist with ADL evaluation and planning for discharge  ¯ Provide patient education as appropriate   Outcome: Progressing    Goal: Maintain or return mobility status to optimal level  INTERVENTIONS:  - Assess patient's baseline mobility status (ambulation, transfers, stairs, etc )    - Identify cognitive and physical deficits and behaviors that affect mobility  - Identify mobility aids required to assist with transfers and/or ambulation (gait belt, sit-to-stand, lift, walker, cane, etc )  - [place] fall precautions as indicated by assessment  - Record patient progress and toleration of activity level on Mobility SBAR; progress patient to next Phase/Stage  - Instruct patient to call for assistance with activity based on assessment  -    Outcome: Progressing      Problem: DISCHARGE PLANNING  Goal: Discharge to home or other facility with appropriate resources  INTERVENTIONS:  - Identify barriers to discharge w/patient and caregiver  - Arrange for needed discharge resources and transportation as appropriate  - Identify discharge learning needs (meds, wound care, etc )  - Arrange for interpretive services to assist at discharge as needed  - Refer to Case Management Department for coordinating discharge planning if the patient needs post-hospital services based on physician/advanced practitioner order or complex needs related to functional status, cognitive ability, or social support system   Outcome: Progressing      Problem: Neurological Deficit  Goal: Neurological status is stable or improving  Interventions:  - Monitor and assess patient's level of consciousness, motor function, sensory function, and level of assistance needed for ADLs  - Monitor and report changes from baseline  Collaborate with interdisciplinary team to initiate plan and implement interventions as ordered  - Provide and maintain a safe environment  - Utilize seizure precautions  - Utilize fall precautions  - Utilize aspiration precautions  - Utilize bleeding precautions  Outcome: Progressing      Problem: Activity Intolerance/Impaired Mobility  Goal: Mobility/activity is maintained at optimum level for patient  Interventions:  - Assess and monitor patient  barriers to mobility and need for assistive/adaptive devices  - Assess patient's emotional response to limitations  - Collaborate with interdisciplinary team and initiate plans and interventions as ordered  - Encourage independent activity per ability   - Maintain proper body alignment  - Perform active/passive rom as tolerated/ordered  - Plan activities to conserve energy   - Turn patient   Outcome: Progressing      Problem: Communication Impairment  Goal: Ability to express needs and understand communication  Assess patient's communication skills and ability to understand information  Patient will demonstrate use of effective communication techniques, alternative methods of communication and understanding even if not able to speak  - Encourage communication and provide alternate methods of communication as needed  - Collaborate with case management/ for discharge needs  - Include patient/family/caregiver in decisions related to communication     Outcome: Progressing      Problem: Potential for Aspiration  Goal: Non-ventilated patient's risk of aspiration is minimized  Assess and monitor vital signs, respiratory status, and labs (WBC)  Monitor for signs of aspiration (tachypnea, cough, rales, wheezing, cyanosis, fever)  - Assess and monitor patient's ability to swallow  - Place patient up in chair to eat if possible  - HOB up at 90 degrees to eat if unable to get patient up into chair   - Supervise patient during oral intake  - Instruct patient to take small bites  - Instruct patient to take small single sips when taking liquids  - Follow patient-specific strategies generated by speech pathologist    Outcome: Progressing      Problem: Nutrition  Goal: Nutrition/Hydration status is improving  Monitor and assess patient's nutrition/hydration status for malnutrition (ex- brittle hair, bruises, dry skin, pale skin and conjunctiva, muscle wasting, smooth red tongue, and disorientation)  Collaborate with interdisciplinary team and initiate plan and interventions as ordered  Monitor patient's weight and dietary intake as ordered or per policy  Utilize nutrition screening tool and intervene per policy  Determine patient's food preferences and provide high-protein, high-caloric foods as appropriate  - Assist patient with eating   - Allow adequate time for meals   - Encourage patient to take dietary supplement as ordered  - Collaborate with clinical nutritionist   - Include patient/family/caregiver in decisions related to nutrition     Outcome: Progressing No

## 2023-02-13 DIAGNOSIS — E13.9 DIABETES 1.5, MANAGED AS TYPE 2 (HCC): ICD-10-CM

## 2023-02-13 RX ORDER — INSULIN DEGLUDEC INJECTION 100 U/ML
INJECTION, SOLUTION SUBCUTANEOUS
Qty: 6 ML | Refills: 0 | Status: SHIPPED | OUTPATIENT
Start: 2023-02-13

## 2023-06-15 ENCOUNTER — NURSING HOME VISIT (OUTPATIENT)
Dept: GERIATRICS | Facility: OTHER | Age: 88
End: 2023-06-15
Payer: MEDICARE

## 2023-06-15 DIAGNOSIS — E13.9 DIABETES 1.5, MANAGED AS TYPE 2 (HCC): ICD-10-CM

## 2023-06-15 DIAGNOSIS — F32.0 CURRENT MILD EPISODE OF MAJOR DEPRESSIVE DISORDER, UNSPECIFIED WHETHER RECURRENT (HCC): ICD-10-CM

## 2023-06-15 DIAGNOSIS — D64.89 ANEMIA DUE TO OTHER CAUSE, NOT CLASSIFIED: ICD-10-CM

## 2023-06-15 DIAGNOSIS — E83.52 HYPERCALCEMIA: Chronic | ICD-10-CM

## 2023-06-15 DIAGNOSIS — I10 PRIMARY HYPERTENSION: ICD-10-CM

## 2023-06-15 DIAGNOSIS — Z79.4 TYPE 2 DIABETES MELLITUS WITH HYPEROSMOLARITY WITHOUT COMA, WITH LONG-TERM CURRENT USE OF INSULIN (HCC): Chronic | ICD-10-CM

## 2023-06-15 DIAGNOSIS — K59.04 CHRONIC IDIOPATHIC CONSTIPATION: ICD-10-CM

## 2023-06-15 DIAGNOSIS — G47.33 OSA (OBSTRUCTIVE SLEEP APNEA): ICD-10-CM

## 2023-06-15 DIAGNOSIS — G30.9 ALZHEIMERS DISEASE (HCC): Chronic | ICD-10-CM

## 2023-06-15 DIAGNOSIS — F02.80 ALZHEIMERS DISEASE (HCC): Chronic | ICD-10-CM

## 2023-06-15 DIAGNOSIS — I63.312 CEREBROVASCULAR ACCIDENT (CVA) DUE TO THROMBOSIS OF LEFT MIDDLE CEREBRAL ARTERY (HCC): ICD-10-CM

## 2023-06-15 DIAGNOSIS — E11.00 TYPE 2 DIABETES MELLITUS WITH HYPEROSMOLARITY WITHOUT COMA, WITH LONG-TERM CURRENT USE OF INSULIN (HCC): Chronic | ICD-10-CM

## 2023-06-15 DIAGNOSIS — E22.0 ACROMEGALIA (HCC): Primary | ICD-10-CM

## 2023-06-15 PROCEDURE — 99348 HOME/RES VST EST LOW MDM 30: CPT | Performed by: INTERNAL MEDICINE

## 2023-06-15 RX ORDER — AMLODIPINE BESYLATE 5 MG/1
5 TABLET ORAL DAILY
COMMUNITY

## 2023-06-15 RX ORDER — LOSARTAN POTASSIUM 50 MG/1
50 TABLET ORAL DAILY
COMMUNITY

## 2023-06-15 RX ORDER — CITALOPRAM HYDROBROMIDE 10 MG/1
5 TABLET ORAL DAILY
COMMUNITY

## 2023-06-15 NOTE — ASSESSMENT & PLAN NOTE
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

## 2023-06-15 NOTE — ASSESSMENT & PLAN NOTE
Antihypertensive treatment was modified patient currently taking amlodipine 5 mg orally daily  Amlodipine was decreased from 10 mg to 5 mg because of underlying peripheral edema  Patient was also started on losartan 50 mg orally daily should patient's systolic remain over 639 losartan should be increased to 100 mg orally daily

## 2023-06-15 NOTE — ASSESSMENT & PLAN NOTE
Patient's stroke occurred intraoperatively when she had her hip fracture patient marked residual deficit with right-sided movement

## 2023-06-15 NOTE — ASSESSMENT & PLAN NOTE
Patient with previous history of CVA which occurred intraoperatively when she had her hip fracture  Patient has remained weak in the right side

## 2023-06-15 NOTE — ASSESSMENT & PLAN NOTE
Patient with history of hypercalcemia last calcium was 10 6  Should avoid calcium supplementation  We will need to monitor BMP on a regular basis

## 2023-06-15 NOTE — ASSESSMENT & PLAN NOTE
Patient with cognitive decline for the past 10 years  Has extreme difficulty remembering recent events  Does recognize family members and has fairly good thought process when it comes to past events

## 2023-06-15 NOTE — ASSESSMENT & PLAN NOTE
Lab Results   Component Value Date    HGBA1C 6 2 (H) 07/07/2020   Patient had blood work performed on June 1, 2023 patient's hemoglobin A1c was 6 3 with an estimated average glucose of 134  Patient currently is on Tresiba 200 units/mL receiving 24 units subcu daily  We will decrease Tresiba to 22 units aiming for hemoglobin A1c between 6 5 and 7 5

## 2023-06-15 NOTE — PROGRESS NOTES
Progress Note [facility]    NAME: Dawit Ng  AGE: 80 y o  SEX: female [id number]    DATE OF ENCOUNTER: 6/15/2023    Assessment and Plan     Type 2 diabetes mellitus, with long-term current use of insulin ([address] )    Lab Results   Component Value Date    HGBA1C 6 2 (H) 07/07/2020   Patient had blood work performed on June 1, 2023 patient's hemoglobin A1c was 6 3 with an estimated average glucose of 134  Patient currently is on Tresiba 200 units/mL receiving 24 units subcu daily  We will decrease Tresiba to 22 units aiming for hemoglobin A1c between 6 5 and 7 5  [facility])  Patient was noted by endocrinologist Dr Ching Terry to 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  Most common tumor seen in NBN 1 involve the parathyroid gland, islet cells of the pancreas, and pituitary gland  Her manifestations were noted in her hands and face  RAZIA (obstructive sleep apnea)  Patient with history of obstructive sleep apnea has had increased daytime somnolence and hypertension as its initial manifestations  Currently on CPAP at nighttime  Hypertension  Antihypertensive treatment was modified patient currently taking amlodipine 5 mg orally daily  Amlodipine was decreased from 10 mg to 5 mg because of underlying peripheral edema  Patient was also started on losartan 50 mg orally daily should patient's systolic remain over 948 losartan should be increased to 100 mg orally daily  CVA (cerebral vascular accident) [facility])  Patient with previous history of CVA which occurred intraoperatively when she had her hip fracture  Patient has remained weak in the right side  Cerebrovascular accident (CVA) due to thrombosis of left middle cerebral artery (HCC)  Patient's stroke occurred intraoperatively when she had her hip fracture patient marked residual deficit with right-sided movement  Alzheimers disease [facility])  Patient with cognitive decline for the past 10 years    Has extreme difficulty remembering recent events  Does recognize family members and has fairly good thought process when it comes to past events  Depression  Seen currently on citalopram 10 mg she takes 5 mg by mouth once a day or half a pill daily  Constipation  Patient currently on bowel regimen need to monitor BMs regularly  Hypercalcemia  Patient with history of hypercalcemia last calcium was 10 6  Should avoid calcium supplementation  We will need to monitor BMP on a regular basis  All medications and routine orders were reviewed and updated as needed  Plan discussed with: Family member    Chief Complaint     No chief complaint on file  History of Present Illness     Patient is a 77-year-old  female who has been residing at [facility] for the past 5 years  The patient has been cognitively impaired for the past 10 years  At present she has extremely poor instant recall but remember quite well events from the past   Patient often gets confused  She has been bound to a wheelchair for the past 3 years  Since past comorbidities include a history of Alzheimer's/vascular dementia, type 2 diabetes mellitus, previous CVA with right hemiparesis, hyperlipidemia, pituitary tumor, chronic diastolic heart failure, patient has had history of osteoporosis obstructive sleep apnea, pituitary tumor, acromegaly, history of DVT of right lower extremity, systolic murmur with crescendo qualities, impacted cerumen  Patient's daughter Jackie Cagle is her power of   She is comfort measures only  HISTORY:  Past Surgical History:   Procedure Laterality Date   • CARPAL TUNNEL RELEASE     • FRACTURE SURGERY     • GA OPEN RX FEMUR FX+INTRAMED RADHA Right 7/18/2018    Procedure: INSERTION NAIL IM FEMUR ANTEGRADE (TROCHANTERIC);   Surgeon: Wero Aleman MD;  Location: [facility];  Service: Orthopedics      Past Medical History:   Diagnosis Date   • Ankle fracture    • Arthritis    • Dementia    • Diabetes [facility])    • Hypertension    • 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[facility] [address] )    • 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[facility] )    • Stroke [facility])      No family history on file  Social History     Socioeconomic History   • Marital status:      Spouse name: Not on file   • Number of children: Not on file   • Years of education: Not on file   • Highest education level: Not on file   Occupational History   • Not on file   Tobacco Use   • Smoking status: Former     Packs/day: 1 00     Types: Cigarettes     Quit date: 1979     Years since quittin 9   • Smokeless tobacco: Never   Substance and Sexual Activity   • Alcohol use: No   • Drug use: No   • Sexual activity: Never   Other Topics Concern   • Not on file   Social History Narrative   • Not on file     Social Determinants of Health     Financial Resource Strain: Not on file   Food Insecurity: Not on file   Transportation Needs: Not on file   Physical Activity: Not on file   Stress: Not on file   Social Connections: Not on file   Intimate Partner Violence: Not on file   Housing Stability: Not on file       Allergies: Allergies   Allergen Reactions   • Amoxicillin    • Ibuprofen        Review of Systems     Review of Systems   Constitutional:        Patient bound to wheelchair   HENT: Positive for hearing loss  Eyes: Positive for visual disturbance  Respiratory: Negative  Cardiovascular: Negative for leg swelling  History of trace edema in lower extremities   Gastrointestinal:        Patient with urine and stool incontinence   Endocrine:        Diabetes mellitus type 2, acromegaly, pituitary tumor, men 1   Genitourinary:        History of urinary incontinence   Musculoskeletal: Positive for arthralgias and gait problem  Skin: Negative  Allergic/Immunologic:        Patient is allergic to amoxicillin and ibuprofen  apparently gets a rash   Neurological: Positive for weakness  Psychiatric/Behavioral: Positive for confusion and decreased concentration  PHQ-2/9 Depression Screening           Medications and orders       Current Outpatient Medications:   •  amLODIPine (NORVASC) 5 mg tablet, Take 5 mg by mouth daily, Disp: , Rfl:   •  citalopram (CeleXA) 10 mg tablet, Take 5 mg by mouth daily, Disp: , Rfl:   •  insulin degludec (TRESIBA) 200 units/mL CONCENTRATED U-200 injection pen, Inject 24 Units under the skin daily, Disp: , Rfl:   •  losartan (COZAAR) 50 mg tablet, Take 50 mg by mouth daily, Disp: , Rfl:   •  acetaminophen (TYLENOL) 325 mg tablet, 2 pills three times per day (with meals ), Disp: 30 tablet, Rfl: 0  •  Alcohol Swabs (ALCOHOL PREP) 70 % PADS, TEST BLOOD SUGAR ONCE DAILY, Disp: 1 each, Rfl: 0  •  alendronate (FOSAMAX) 70 mg tablet, TAKE 1 TABLET BY MOUTH WEEKLY ON WEDNESDAY (DRINK PLENTY OF WATER   DO NOT LIE DOWN FOR 30 MINUTES AFTER TAKING), Disp: 4 tablet, Rfl: 0  •  aspirin 81 mg chewable tablet, CHEW 1 TABLET BY MOUTH ONCE DAILY, Disp: 30 tablet, Rfl: 0  •  atorvastatin (LIPITOR) 40 mg tablet, TAKE 1 TABLET BY MOUTH IN THE EVENING, Disp: 30 tablet, Rfl: 0  •  bisacodyl (DULCOLAX) 10 mg suppository, Insert 1 suppository (10 mg total) into the rectum daily as needed for constipation, Disp: 12 suppository, Rfl: 0  •  BISACODYL 5 MG EC tablet, TAKE 2 TABLETS (10MG) BY MOUTH ONCE DAILY AS NEEDED FOR CONSTIPATION, Disp: 60 tablet, Rfl: 0  •  bromocriptine (PARLODEL) 2 5 mg tablet, TAKE 1\4 TABLET (0 625MG) BY MOUTH AT BEDTIME, Disp: 8 tablet, Rfl: 0  •  cholestyramine light (PREVALITE) 4 GM/DOSE powder, MIX 1 SCOOP WITH 4-8OZ LIQ & ADMIN AS NEEDED (AT LEAST 2HRS FROM MEDS), Disp: 239 4 g, Rfl: 0  •  DOCQLACE 100 MG capsule, TAKE 1 CAPSULE BY MOUTH ONCE DAILY, Disp: 30 capsule, Rfl: 0  •  DOK PLUS 50-8 6 MG per tablet, TAKE 2 TABLETS BY MOUTH ONCE DAILY (HOLD FOR LOOSE STOOLS) -DRINKPLENTY OF WATER, Disp: 60 tablet, Rfl: 0  •  GLUCAGEN HYPOKIT 1 MG injection, ADMINISTER IF GLUCOSE <70 @ 15MINS AFTER GLUCOSE GEL ADMINISTERED (Patient not taking: "Reported on 7/10/2019), Disp: 1 mL, Rfl: 0  •  glucose (Glutose 15) 40 %, Take 15 g by mouth if needed for low blood sugar for up to 1 dose Take 1 tube by mouth as needed ( IF BS <70, RECHECK IN 15 MINUTES AFTER ADMINISTERING DM : DM, Disp: 15 g, Rfl: 0  •  MONOJECT ULT COMFORT 0 5CC/28G 28G X 1/2\" 0 5 ML MISC, USE 1 SYRINGE DAILY AT BEDTIME, Disp: 100 each, Rfl: 0  •  NovoFine Autocover Pen Needle 30G X 8 MM MISC, USE AS DIRECTED, Disp: 100 each, Rfl: 0  •  vitamin B-12 (VITAMIN B-12) 1,000 mcg tablet, TAKE 1 TABLET BY MOUTH ONCE DAILY, Disp: 30 tablet, Rfl: 0       Objective     Vitals: There were no vitals filed for this visit  Physical Exam  Constitutional:       Appearance: She is obese  HENT:      Head: Atraumatic  Right Ear: Ear canal and external ear normal       Left Ear: Ear canal and external ear normal       Nose: Nose normal       Mouth/Throat:      Mouth: Mucous membranes are dry  Eyes:      Pupils: Pupils are equal, round, and reactive to light  Cardiovascular:      Rate and Rhythm: Normal rate and regular rhythm  Pulses: Normal pulses  Heart sounds: Murmur heard  Comments: Patient with crescendo murmur heard best over the aortic focus  Pulmonary:      Breath sounds: Normal breath sounds  Abdominal:      General: Bowel sounds are normal       Palpations: Abdomen is soft  Musculoskeletal:      Cervical back: Neck supple  Skin:     General: Skin is dry  Neurological:      Mental Status: Mental status is at baseline  She is disoriented  Motor: Weakness present  Coordination: Coordination abnormal       Comments: Patient with right-sided deficits little movement in right arm and right leg  Psychiatric:         Mood and Affect: Mood normal          Pertinent Laboratory/Diagnostic Studies: The following labs/studies were reviewed please see facility chart for details              "

## 2023-06-15 NOTE — ASSESSMENT & PLAN NOTE
Patient with history of obstructive sleep apnea has had increased daytime somnolence and hypertension as its initial manifestations  Currently on CPAP at nighttime

## 2023-11-01 DIAGNOSIS — E13.9 DIABETES 1.5, MANAGED AS TYPE 2 (HCC): ICD-10-CM

## 2024-01-26 RX ORDER — INSULIN DEGLUDEC 200 U/ML
INJECTION, SOLUTION SUBCUTANEOUS
Qty: 6 ML | Refills: 0 | OUTPATIENT
Start: 2024-01-26

## 2024-01-30 RX ORDER — INSULIN ADMIN. SUPPLIES
INSULIN PEN (EA) SUBCUTANEOUS
Qty: 100 EACH | Refills: 0 | OUTPATIENT
Start: 2024-01-30

## 2024-02-19 DIAGNOSIS — E13.9 DIABETES 1.5, MANAGED AS TYPE 2 (HCC): ICD-10-CM

## 2024-02-21 PROBLEM — H61.23 BILATERAL IMPACTED CERUMEN: Status: RESOLVED | Noted: 2017-08-15 | Resolved: 2024-02-21

## 2024-02-21 PROBLEM — B37.49 CANDIDAL UTI (URINARY TRACT INFECTION): Status: RESOLVED | Noted: 2018-05-08 | Resolved: 2024-02-21

## 2024-03-24 RX ORDER — INSULIN DEGLUDEC 200 U/ML
INJECTION, SOLUTION SUBCUTANEOUS
Qty: 6 ML | Refills: 0 | OUTPATIENT
Start: 2024-03-24

## 2024-03-28 RX ORDER — LOPERAMIDE HYDROCHLORIDE 2 MG/1
CAPSULE ORAL
Qty: 30 CAPSULE | Refills: 0 | OUTPATIENT
Start: 2024-03-28

## 2024-04-03 ENCOUNTER — HOSPITAL ENCOUNTER (OUTPATIENT)
Dept: RADIOLOGY | Facility: HOSPITAL | Age: 89
Discharge: HOME | End: 2024-04-03
Payer: MEDICARE

## 2024-04-03 DIAGNOSIS — R05.9 COUGHING: ICD-10-CM

## 2024-04-03 DIAGNOSIS — R63.8 DIFFICULTY EATING: ICD-10-CM

## 2024-04-03 PROCEDURE — 74230 X-RAY XM SWLNG FUNCJ C+: CPT

## 2024-04-03 PROCEDURE — 92611 MOTION FLUOROSCOPY/SWALLOW: CPT

## 2024-04-03 NOTE — PROCEDURES
Speech Pathology - Modified Barium Swallow Study    Patient Name: Sherie Jones    Today's Date: 4/3/2024     Problem List  Active Problems:  There are no active Hospital Problems.      Past Medical History  Past Medical History:   Diagnosis Date    Ankle fracture     Arthritis     Dementia (HCC)     Diabetes (HCC)     Hypertension     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HCC)     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HCC)     Stroke (HCC)        Past Surgical History  Past Surgical History:   Procedure Laterality Date    CARPAL TUNNEL RELEASE      FRACTURE SURGERY      MO OPTX FEM SHFT FX W/INSJ IMED IMPLT W/WO SCREW Right 7/18/2018    Procedure: INSERTION NAIL IM FEMUR ANTEGRADE (TROCHANTERIC);  Surgeon: Donell Nathan MD;  Location: [facility];  Service: Orthopedics       Assessment Summary:    Pt presents with mild oropharyngeal dysphagia characterized by reduced bolus manipulation and propulsion given her poor position and reduced ability to hold up her head.  Labored transfers were noted as well as increased secretion in the oral cavity at times with spill into the valleculae.  She has mildly reduced swallow initiation but adequate airway protection during the swallows w/ no aspiration noted on any material offered today.  Her head control and position is of most concern as well as the increased secretions.   Reduced esophageal clearing w/ some retropulsion of material within at the end of the study.   Note: Images are available for review in PACS as desired.    Recommendations:   Recommended Diet:  would begin w/ soft, moist material w/ SLP, thin liquids.  Advance as pt is able.      Recommended Form of Medications: whole with puree and crushed with puree   Aspiration precautions and compensatory swallowing strategies: upright posture, only feed when fully alert, slow rate of feeding, small bites/sips, alternating bites and sips, and remain upright for ~1 hour after po meals.  Oral care often during the day.  Attempt  to encourage a 2* swallow to clear secretions/ambient fluid.   Consider referral to:  OT/PT for seating/positioning  SLP Dysphagia therapy recommended: for diet change/upgrade.     Results Reviewed with: patient and family -images reviewed  Pt/Family Education: initiated. Pt and caregivers would benefit from/require continued education.        General Information;  Pt is a 89 y.o. female with a PMH remarkable for dementia, CVA, hip fracture.  She is wheelchair bound at [facility].  She is accompanied by her family today.   Current concerns for dysphagia include choking and coughing w/ po (per her family).  They report she has not had pneumonia or recent fevers. She needs to be fed.  They report they do notice some coughing after meals when she is placed back in bed.    MBS was recommended to assess oropharyngeal stage swallowing skills at this time.     Prior MBS: none on record    Oral Mechanism Exam  Facial: symmetrical and masked facies  Labial: decreased strength and decreased coordination  Lingual: decreased ROM and difficulty following cues  Velum: unable to visualize  Mandible: adequate ROM  Dentition:  upper partial, some missing on bottom  Vocal quality:  limited vocalization    Volitional Cough: unable to initiate volitional cough   Respiratory Status: on RA    Pt w/  excess secretions in the oral cavity.  The pt does not readily clear when offered a cue.     Pt was viewed sitting upright in the lateral and AP positions. Due to concerns for patient safety / patient refusal, trials provided deviated from the MBSImP Validated Protocol. Pt was given 5-mL thin liquid x2, 20-mL cup sip thin, 5-mL nectar thick, 20-mL cup sip nectar thick, 5-mL honey thick, 5-mL pudding, ½ cookie coated with 3-mL pudding, 5-mL nectar thick in the AP position, and 5-mL pudding in the AP position. Pt was also given thin liquids by straw, as well as a barium tablet with pudding.  No sequential sips were offered as  it was difficult for the pt to complete.     Initial view observations/comments: Limited view secondary to body habitus-pt was repositioned mult times for optimal view but it remained difficult.        8-Point Penetration-Aspiration Scale   Thin liquid 1 - Material does not enter the airway   Nectar thick liquid 1 - Material does not enter the airway   Honey thick liquid 1 - Material does not enter the airway   Puree (pudding) 1 - Material does not enter the airway   Solid 1 - Material does not enter the airway     Strategies and Efficacy: pt has difficulty following commands for strategies.  SLP moved her head and repositioned manually    Aspiration Response and Efficacy:  no aspiration occurred.     MBS IMP Rating    ORAL Impairment  Compinent 1--Lip Closure  Judged at any point during the swallow.  1 - Interlabial escape; no progression to anterior lip    Component 2--Tongue Control During Bolus Hold  Judged on held liquid boluses only and prior to productive tongue movement.   1 - Escape to lateral buccal cavity/floor of mouth (FOM)    Component 3--Bolus Preparation/Mastication  Judged only during presentation of 1/2 shortbread cookie coated in pudding.   2 - Disorganized chewing/mashing with solid pieces of bolus unchewed    Component 4--Bolus Transport/Lingual Motion  Judged after first productive tongue movement for oral bolus transport.  3 - Repetitive/disorganized tongue motion    Component 5--Oral Residue  Judged after first swallow or after the last swallow of the sequential swallow task.  2 - Residue collection on oral structures   Location   C - Tongue    Component 6--Initiation of Pharyngeal Swallow  Judged at first movement of the brisk superior-anterior hyoid trajectory.  3 - Bolus head in pyriforms      PHARYNGEAL Impairment  Component 7--Soft Palate Elevation  Judged during maximum displacement of soft palate.  0 - No bolus between the soft palate (SP)/pharyngeal wall (PW)    Component 8--Laryngeal  Elevation  Judged when epiglottis is in its most horizontal position.  1 - Partial superior movement of thyroid cartilage/partial approximation of arytenoids to epiglottic petiole    Component 9--Anterior Hyoid Excursion  Judged at height of swallow/maximal anterior hyoid displacement.  1 - Partial anterior movement-very minimal movement    Component 10--Epiglottic Movement  Judged at height of swallow/maximal anterior hyoid displacement.  0 - Complete inversion    Component 11--Laryngeal Vestibular Closure  Judged at height of swallow/maximal anterior hyoid displacement.  1 - Incomplete; narrow column air/contrast in laryngeal vestibule    Component 12--Pharyngeal Stripping Wave  Judged during the full duration of the pharyngeal swallow.  0 - Present - complete    Component 13--Pharyngeal Contraction  Judged in AP view at rest and throughout maximum movement of structures.  0 - Complete    Component 14--Pharyngoesophageal Segment Opening  Judged during maximum distension of PES and throughout opening and closure.  0 - Complete distension and complete duration; no obstruction of flow    Component 15--Tongue Base (TB) Retraction  Judged during maximum retraction of the tongue base.  1 - Trace column of contrast or air between TB and PW    Component 16--Pharyngeal Residue  Judged after first swallow or after the last swallow of the sequential swallow task.  2 - Collection of residue within or on pharyngeal structures   Location   B - Valleculae-noted following straw sip thin, pt used an independent 2* swallow to clear but did not clear upon command.        ESOPHAGEAL Impairment  Component 17--Esophageal Clearance Upright Position  Judged in AP view during bolus transit through the oral cavity to the LES  2 - Esophageal retention with retrograde flow below pharyngoesophageal segment (PES)

## 2024-05-19 RX ORDER — INSULIN ADMIN. SUPPLIES
INSULIN PEN (EA) SUBCUTANEOUS
Refills: 0 | OUTPATIENT
Start: 2024-05-19

## 2025-04-07 DIAGNOSIS — Z51.5 HOSPICE CARE: Primary | ICD-10-CM

## 2025-04-07 RX ORDER — MORPHINE SULFATE 20 MG/ML
5 SOLUTION ORAL
Qty: 30 ML | Refills: 0 | Status: SHIPPED | OUTPATIENT
Start: 2025-04-07

## (undated) DEVICE — GLOVE INDICATOR PI UNDERGLOVE SZ 8.5 BLUE

## (undated) DEVICE — DRAPE C-ARMOUR

## (undated) DEVICE — GLOVE SRG BIOGEL ORTHOPEDIC 8.5

## (undated) DEVICE — MEDI-VAC YANKAUER SUCTION HANDLE W/STRAIGHT TIP & CONTROL VENT: Brand: CARDINAL HEALTH

## (undated) DEVICE — 3M™ TEGADERM™ TRANSPARENT FILM DRESSING FRAME STYLE, 1626W, 4 IN X 4-3/4 IN (10 CM X 12 CM), 50/CT 4CT/CASE: Brand: 3M™ TEGADERM™

## (undated) DEVICE — ABDOMINAL PAD: Brand: DERMACEA

## (undated) DEVICE — CURITY NON-ADHERENT STRIPS: Brand: CURITY

## (undated) DEVICE — SPONGE PVP SCRUB WING STERILE

## (undated) DEVICE — 3.2MM GUIDE WIRE 400MM

## (undated) DEVICE — 3M™ TEGADERM™ TRANSPARENT FILM DRESSING FRAME STYLE, 1628, 6 IN X 8 IN (15 CM X 20 CM), 10/CT 8CT/CASE: Brand: 3M™ TEGADERM™

## (undated) DEVICE — PROXIMATE PLUS MD MULTI-DIRECTIONAL RELEASE SKIN STAPLERS CONTAINS 35 STAINLESS STEEL STAPLES APPROXIMATE CLOSED DIMENSIONS: 6.9MM X 3.9MM WIDE: Brand: PROXIMATE

## (undated) DEVICE — 2.5MM REAMING ROD WITH BALL TIP/950MM-STERILE

## (undated) DEVICE — SUT VICRYL PLUS 0 CTB-1 27 IN VCPB260H

## (undated) DEVICE — PADDING CAST 4 IN  COTTON STRL

## (undated) DEVICE — 6617 IOBAN II PATIENT ISOLATION DRAPE 5/BX,4BX/CS: Brand: STERI-DRAPE™ IOBAN™ 2

## (undated) DEVICE — ARTHROSCOPY FLOOR MAT

## (undated) DEVICE — STERILE ORIF HIP PACK: Brand: CARDINAL HEALTH

## (undated) DEVICE — PLUMEPEN PRO 10FT

## (undated) DEVICE — DRAPE C-ARM X-RAY

## (undated) DEVICE — GAUZE SPONGES,16 PLY: Brand: CURITY

## (undated) DEVICE — INTENDED FOR TISSUE SEPARATION, AND OTHER PROCEDURES THAT REQUIRE A SHARP SURGICAL BLADE TO PUNCTURE OR CUT.: Brand: BARD-PARKER SAFETY BLADES SIZE 10, STERILE

## (undated) DEVICE — SILVER-COATED ANTIMICROBIAL BARRIER DRESSING: Brand: ACTICOAT   4" X 8"

## (undated) DEVICE — DRAPE SURGIKIT SADDLE BAG

## (undated) DEVICE — ACE WRAP 6 IN UNSTERILE

## (undated) DEVICE — INTENDED FOR TISSUE SEPARATION, AND OTHER PROCEDURES THAT REQUIRE A SHARP SURGICAL BLADE TO PUNCTURE OR CUT.: Brand: BARD-PARKER SAFETY BLADES SIZE 15, STERILE

## (undated) DEVICE — CHLORAPREP HI-LITE 26ML ORANGE

## (undated) DEVICE — 4.0MM THREE-FLUTED DRILL BIT QC/260MM/65MM CALIBRATION

## (undated) DEVICE — SUT VICRYL PLUS 2-0 CTB-1 27 IN VCPB259H

## (undated) DEVICE — REM POLYHESIVE ADULT PATIENT RETURN ELECTRODE: Brand: VALLEYLAB